# Patient Record
Sex: MALE | Race: BLACK OR AFRICAN AMERICAN | Employment: OTHER | URBAN - METROPOLITAN AREA
[De-identification: names, ages, dates, MRNs, and addresses within clinical notes are randomized per-mention and may not be internally consistent; named-entity substitution may affect disease eponyms.]

---

## 2017-06-13 ENCOUNTER — APPOINTMENT (OUTPATIENT)
Dept: GENERAL RADIOLOGY | Age: 82
End: 2017-06-13
Attending: STUDENT IN AN ORGANIZED HEALTH CARE EDUCATION/TRAINING PROGRAM
Payer: MEDICARE

## 2017-06-13 ENCOUNTER — HOSPITAL ENCOUNTER (EMERGENCY)
Age: 82
Discharge: HOME | End: 2017-06-13
Attending: STUDENT IN AN ORGANIZED HEALTH CARE EDUCATION/TRAINING PROGRAM
Payer: MEDICARE

## 2017-06-13 VITALS
WEIGHT: 205 LBS | HEART RATE: 82 BPM | DIASTOLIC BLOOD PRESSURE: 76 MMHG | TEMPERATURE: 97.4 F | OXYGEN SATURATION: 92 % | SYSTOLIC BLOOD PRESSURE: 137 MMHG | RESPIRATION RATE: 20 BRPM | HEIGHT: 68 IN | BODY MASS INDEX: 31.07 KG/M2

## 2017-06-13 DIAGNOSIS — S39.012A BACK STRAIN, INITIAL ENCOUNTER: Primary | ICD-10-CM

## 2017-06-13 PROCEDURE — 72100 X-RAY EXAM L-S SPINE 2/3 VWS: CPT

## 2017-06-13 PROCEDURE — 99282 EMERGENCY DEPT VISIT SF MDM: CPT

## 2017-06-13 PROCEDURE — 96372 THER/PROPH/DIAG INJ SC/IM: CPT

## 2017-06-13 PROCEDURE — 74011250636 HC RX REV CODE- 250/636: Performed by: STUDENT IN AN ORGANIZED HEALTH CARE EDUCATION/TRAINING PROGRAM

## 2017-06-13 RX ORDER — HYDRALAZINE HYDROCHLORIDE 10 MG/1
5 TABLET, FILM COATED ORAL 2 TIMES DAILY
COMMUNITY
End: 2017-07-02

## 2017-06-13 RX ORDER — LIDOCAINE 50 MG/G
PATCH TOPICAL
Qty: 1 EACH | Refills: 0 | Status: SHIPPED | OUTPATIENT
Start: 2017-06-13 | End: 2017-07-02

## 2017-06-13 RX ORDER — HYDROMORPHONE HYDROCHLORIDE 1 MG/ML
1 INJECTION, SOLUTION INTRAMUSCULAR; INTRAVENOUS; SUBCUTANEOUS
Status: COMPLETED | OUTPATIENT
Start: 2017-06-13 | End: 2017-06-13

## 2017-06-13 RX ORDER — ASPIRIN 81 MG/1
81 TABLET ORAL DAILY
COMMUNITY
End: 2017-09-26

## 2017-06-13 RX ORDER — BUMETANIDE 1 MG/1
0.5 TABLET ORAL DAILY
Status: ON HOLD | COMMUNITY
End: 2018-07-14

## 2017-06-13 RX ORDER — ALBUTEROL SULFATE 90 UG/1
2 AEROSOL, METERED RESPIRATORY (INHALATION)
COMMUNITY
End: 2017-09-26

## 2017-06-13 RX ADMIN — HYDROMORPHONE HYDROCHLORIDE 1 MG: 1 INJECTION, SOLUTION INTRAMUSCULAR; INTRAVENOUS; SUBCUTANEOUS at 09:17

## 2017-06-13 NOTE — ED NOTES
Dr. Mily Forte discussed d/c instructions and information with pt. Pt verbalized understanding. Pt assisted out of ED via w/c b y staff. No acute distress noted upon leave.

## 2017-06-13 NOTE — DISCHARGE INSTRUCTIONS
We hope that we have addressed all of your medical concerns. The examination and treatment you received in the Emergency Department were for an emergent problem and were not intended as complete care. It is important that you follow up with your healthcare provider(s) for ongoing care. If your symptoms worsen or do not improve as expected, and you are unable to reach your usual health care provider(s), you should return to the Emergency Department. Today's healthcare is undergoing tremendous change, and patient satisfaction surveys are one of the many tools to assess the quality of medical care. You may receive a survey from the [employer] regarding your experience in the Emergency Department. I hope that your experience has been completely positive, particularly the medical care that I provided. As such, please participate in the survey; anything less than excellent does not meet my expectations or intentions. [facility] and [address] [place] [address] participate in nationally recognized quality of care measures. If your blood pressure is greater than 120/80, as reported below, we urge that you seek medical care to address the potential of high blood pressure, commonly known as hypertension. Hypertension can be hereditary or can be caused by certain medical conditions, pain, stress, or \"white coat syndrome. \"       Please make an appointment with your health care provider(s) for follow up of your Emergency Department visit. VITALS:   Patient Vitals for the past 8 hrs:   Temp Pulse Resp BP SpO2   06/13/17 0846 97.4 °F (36.3 °C) 82 20 126/88 95 %          Thank you for allowing us to provide you with medical care today. We realize that you have many choices for your emergency care needs. Please choose us in the future for any continued health care needs. Teresa Bernard Monday, [address]   Office: [phone]            No results found for this or any previous visit (from the past 24 hour(s)). Xr Spine Lumb 2 Or 3 V    Result Date: 6/13/2017  EXAM:  XR SPINE LUMB 2 OR 3 V INDICATION:  Low back pain and right hip pain since yesterday. COMPARISON: None. TECHNIQUE: 3 views lumbar spine. FINDINGS: Vertebral body heights are maintained. There is intervertebral disc space narrowing at L3-4, L4-5, and L5-S1. There is 7 mm of anterolisthesis at L4-5 and L5-S1. Lower lumbar facet arthrosis is demonstrated. Bilateral hip prostheses are partially visualized. An IVC filter is present. Round radiodensities in the right upper abdomen may represent gallstones. Vascular calcification is noted. IMPRESSION: 1. No acute fracture or subluxation. Lower lumbar degenerative changes including grade 1 anterolisthesis at L4-5 and L5-S1. 2. Possible cholelithiasis. Back Strain: Care Instructions  Your Care Instructions    Back strain happens when you overstretch, or pull, a muscle in your back. You may hurt your back in an accident or when you exercise or lift something. Most back pain will get better with rest and time. You can take care of yourself at home to help your back heal.  Follow-up care is a key part of your treatment and safety. Be sure to make and go to all appointments, and call your doctor if you are having problems. It's also a good idea to know your test results and keep a list of the medicines you take. How can you care for yourself at home? · Try to stay as active as you can, but stop or reduce any activity that causes pain. · Put ice or a cold pack on the sore muscle for 10 to 20 minutes at a time to stop swelling. Try this every 1 to 2 hours for 3 days (when you are awake) or until the swelling goes down. Put a thin cloth between the ice pack and your skin. · After 2 or 3 days, apply a heating pad on low or a warm cloth to your back.  Some doctors suggest that you go back and forth between hot and cold treatments. · Take pain medicines exactly as directed. ¨ If the doctor gave you a prescription medicine for pain, take it as prescribed. ¨ If you are not taking a prescription pain medicine, ask your doctor if you can take an over-the-counter medicine. · Try sleeping on your side with a pillow between your legs. Or put a pillow under your knees when you lie on your back. These measures can ease pain in your lower back. · Return to your usual level of activity slowly. When should you call for help? Call 911 anytime you think you may need emergency care. For example, call if:  · You are unable to move a leg at all. Call your doctor now or seek immediate medical care if:  · You have new or worse symptoms in your legs, belly, or buttocks. Symptoms may include:  ¨ Numbness or tingling. ¨ Weakness. ¨ Pain. · You lose bladder or bowel control. Watch closely for changes in your health, and be sure to contact your doctor if you are not getting better as expected. Where can you learn more? Go to http://kavitha-jolie.info/. Enter H642 in the search box to learn more about \"Back Strain: Care Instructions. \"  Current as of: May 23, 2016  Content Version: 11.2  © 4663-0919 [employer]. Care instructions adapted under license by [employer] (which disclaims liability or warranty for this information). If you have questions about a medical condition or this instruction, always ask your healthcare professional. [address] any warranty or liability for your use of this information.

## 2017-06-13 NOTE — ED PROVIDER NOTES
HPI Comments: 80 y.o. male with past medical history significant for CAD, heart failure and HTN who presents from home accompanied by his daughter with chief complaint of back pain. Pt states he \"pulled a muscle\" in his right lumbar back 2 days ago after he leaned down to  a piece of paper while seated in a chair. Pt states pain is exacerbated with inspiration and movement. Pt states he has a history of the same. Pt states he took tylenol with no relief. Pt states he is ambulatory with some pain. Pt endorses a history of bilateral total hip replacements by Dr. Jayy Banegas. Pt's daughter also expresses concern over lipoma on right back that \"is now moveable\". 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 Pt specifically denies fever, vomiting and abd pain. There are no other acute medical concerns at this time. Social hx: denies tobacco use; denies EtOH use  PCP: Garret Shaw MD    Note written by Timi Mccain, as dictated by Curry Vásquez MD 9:05 AM         The history is provided by the patient and a relative. Past Medical History:   Diagnosis Date    CAD (coronary artery disease)     Heart failure ([facility] [address]     Hypertension        Past Surgical History:   Procedure Laterality Date    HX ORTHOPAEDIC      bilateral hip replacement    HX PACEMAKER           No family history on file. Social History     Social History    Marital status:      Spouse name: N/A    Number of children: N/A    Years of education: N/A     Occupational History    Not on file. Social History Main Topics    Smoking status: Never Smoker    Smokeless tobacco: Not on file    Alcohol use No    Drug use: Not on file    Sexual activity: Not on file     Other Topics Concern    Not on file     Social History Narrative         ALLERGIES: Penicillins    Review of Systems   Constitutional: Negative for fever. Gastrointestinal: Negative for abdominal pain and vomiting. Musculoskeletal: Positive for back pain. All other systems reviewed and are negative. Vitals:    06/13/17 0846   BP: 126/88   Pulse: 82   Resp: 20   Temp: 97.4 °F (36.3 °C)   SpO2: 95%   Weight: 93 kg (205 lb)   Height: 5' 8\" (1.727 m)            Physical Exam   Constitutional: He is oriented to person, place, and time. He appears well-developed. No distress. HENT:   Head: Normocephalic and atraumatic. Eyes: Conjunctivae and EOM are normal.   Neck: Normal range of motion. Neck supple. Cardiovascular: Normal rate, regular rhythm and normal heart sounds. No murmur heard. Pulmonary/Chest: Effort normal and breath sounds normal. No respiratory distress. Abdominal: Soft. Bowel sounds are normal. He exhibits no distension. There is no tenderness. There is no rebound. Musculoskeletal: Normal range of motion. He exhibits no edema or tenderness. Right para-lumbar muscle spasm with mild tenderness to palpation. No midline tenderness to palpation. No saddle anesthesia. Lipoma over right thoracic back. Straight-leg test negative. Neurological: He is alert and oriented to person, place, and time. No cranial nerve deficit. He exhibits normal muscle tone. Coordination normal.   Skin: Skin is warm and dry. Nursing note and vitals reviewed. Note written by Timi Enriquez, as dictated by Rene Vasquez MD 9:05 AM     [facility]  ED Course   The patient presented with acute back pain. The patient is now resting comfortably and feels better, is alert, talkative, interactive and in no distress. The repeat examination is unremarkable and benign. The patient is neurologically intact and is ambulatory in the ED. The patient has no fever, no bowel or bladder incontinence, no saddle anesthesia, and is otherwise alert and well-appearing.  The history, physical examination, and diagnostics (if any) do not suggest the presence of acute spinal epidural abscess, acute spinal epidural bleed, cauda equina syndrome, abdominal aortic aneurysm, aortic dissection or other process requiring further testing, treatment or consultation in the emergency department. The vital signs have been stable. The patient's condition is stable and appropriate for discharge. The patient will pursue further outpatient evaluation with the primary care physician or other designated or consulting physician as indicated in the discharge instructions.       Procedures

## 2017-06-13 NOTE — ED TRIAGE NOTES
Triage Note: Patient is coming in complaining of right hip pain that started yesterday after leaning over to pick something up. Denies fall.

## 2017-07-02 ENCOUNTER — APPOINTMENT (OUTPATIENT)
Dept: GENERAL RADIOLOGY | Age: 82
End: 2017-07-02
Attending: EMERGENCY MEDICINE
Payer: MEDICARE

## 2017-07-02 ENCOUNTER — HOSPITAL ENCOUNTER (OUTPATIENT)
Age: 82
Setting detail: OBSERVATION
Discharge: HOME HEALTH | End: 2017-07-03
Attending: EMERGENCY MEDICINE | Admitting: INTERNAL MEDICINE
Payer: MEDICARE

## 2017-07-02 DIAGNOSIS — N28.9 RENAL INSUFFICIENCY: ICD-10-CM

## 2017-07-02 DIAGNOSIS — R07.9 CHEST PAIN, UNSPECIFIED TYPE: Primary | ICD-10-CM

## 2017-07-02 LAB
ALBUMIN SERPL BCP-MCNC: 3.2 G/DL (ref 3.5–5)
ALBUMIN/GLOB SERPL: 0.7 {RATIO} (ref 1.1–2.2)
ALP SERPL-CCNC: 118 U/L (ref 45–117)
ALT SERPL-CCNC: 12 U/L (ref 12–78)
AMYLASE SERPL-CCNC: 74 U/L (ref 25–115)
ANION GAP BLD CALC-SCNC: 7 MMOL/L (ref 5–15)
APTT PPP: 34 SEC (ref 22.1–32.5)
AST SERPL W P-5'-P-CCNC: 11 U/L (ref 15–37)
BASOPHILS # BLD AUTO: 0 K/UL (ref 0–0.1)
BASOPHILS # BLD: 0 % (ref 0–1)
BILIRUB SERPL-MCNC: 0.5 MG/DL (ref 0.2–1)
BNP SERPL-MCNC: 395 PG/ML (ref 0–100)
BUN SERPL-MCNC: 17 MG/DL (ref 6–20)
BUN/CREAT SERPL: 9 (ref 12–20)
CALCIUM SERPL-MCNC: 8.8 MG/DL (ref 8.5–10.1)
CHLORIDE SERPL-SCNC: 106 MMOL/L (ref 97–108)
CO2 SERPL-SCNC: 26 MMOL/L (ref 21–32)
CREAT SERPL-MCNC: 1.89 MG/DL (ref 0.7–1.3)
D DIMER PPP FEU-MCNC: 1.38 MG/L FEU (ref 0–0.65)
EOSINOPHIL # BLD: 0.4 K/UL (ref 0–0.4)
EOSINOPHIL NFR BLD: 4 % (ref 0–7)
ERYTHROCYTE [DISTWIDTH] IN BLOOD BY AUTOMATED COUNT: 18.9 % (ref 11.5–14.5)
GLOBULIN SER CALC-MCNC: 4.8 G/DL (ref 2–4)
GLUCOSE SERPL-MCNC: 101 MG/DL (ref 65–100)
HCT VFR BLD AUTO: 35.4 % (ref 36.6–50.3)
HGB BLD-MCNC: 11 G/DL (ref 12.1–17)
INR PPP: 1.1 (ref 0.9–1.1)
LIPASE SERPL-CCNC: 95 U/L (ref 73–393)
LYMPHOCYTES # BLD AUTO: 25 % (ref 12–49)
LYMPHOCYTES # BLD: 2.5 K/UL (ref 0.8–3.5)
MCH RBC QN AUTO: 26.6 PG (ref 26–34)
MCHC RBC AUTO-ENTMCNC: 31.1 G/DL (ref 30–36.5)
MCV RBC AUTO: 85.5 FL (ref 80–99)
MONOCYTES # BLD: 1.1 K/UL (ref 0–1)
MONOCYTES NFR BLD AUTO: 11 % (ref 5–13)
NEUTS SEG # BLD: 6.2 K/UL (ref 1.8–8)
NEUTS SEG NFR BLD AUTO: 60 % (ref 32–75)
PLATELET # BLD AUTO: 306 K/UL (ref 150–400)
POTASSIUM SERPL-SCNC: 4.2 MMOL/L (ref 3.5–5.1)
PROT SERPL-MCNC: 8 G/DL (ref 6.4–8.2)
PROTHROMBIN TIME: 11.7 SEC (ref 9–11.1)
RBC # BLD AUTO: 4.14 M/UL (ref 4.1–5.7)
SODIUM SERPL-SCNC: 139 MMOL/L (ref 136–145)
THERAPEUTIC RANGE,PTTT: ABNORMAL SECS (ref 58–77)
TROPONIN I SERPL-MCNC: <0.04 NG/ML
WBC # BLD AUTO: 10.2 K/UL (ref 4.1–11.1)

## 2017-07-02 PROCEDURE — 99285 EMERGENCY DEPT VISIT HI MDM: CPT

## 2017-07-02 PROCEDURE — 80053 COMPREHEN METABOLIC PANEL: CPT | Performed by: EMERGENCY MEDICINE

## 2017-07-02 PROCEDURE — 85379 FIBRIN DEGRADATION QUANT: CPT | Performed by: INTERNAL MEDICINE

## 2017-07-02 PROCEDURE — 71020 XR CHEST PA LAT: CPT

## 2017-07-02 PROCEDURE — 99218 HC RM OBSERVATION: CPT

## 2017-07-02 PROCEDURE — 83690 ASSAY OF LIPASE: CPT | Performed by: INTERNAL MEDICINE

## 2017-07-02 PROCEDURE — 84484 ASSAY OF TROPONIN QUANT: CPT | Performed by: EMERGENCY MEDICINE

## 2017-07-02 PROCEDURE — 85730 THROMBOPLASTIN TIME PARTIAL: CPT | Performed by: EMERGENCY MEDICINE

## 2017-07-02 PROCEDURE — 83880 ASSAY OF NATRIURETIC PEPTIDE: CPT | Performed by: EMERGENCY MEDICINE

## 2017-07-02 PROCEDURE — 82150 ASSAY OF AMYLASE: CPT | Performed by: INTERNAL MEDICINE

## 2017-07-02 PROCEDURE — 85025 COMPLETE CBC W/AUTO DIFF WBC: CPT | Performed by: EMERGENCY MEDICINE

## 2017-07-02 PROCEDURE — 74011250637 HC RX REV CODE- 250/637: Performed by: EMERGENCY MEDICINE

## 2017-07-02 PROCEDURE — 74011250636 HC RX REV CODE- 250/636: Performed by: INTERNAL MEDICINE

## 2017-07-02 PROCEDURE — 36415 COLL VENOUS BLD VENIPUNCTURE: CPT | Performed by: EMERGENCY MEDICINE

## 2017-07-02 PROCEDURE — 93005 ELECTROCARDIOGRAM TRACING: CPT

## 2017-07-02 PROCEDURE — 85610 PROTHROMBIN TIME: CPT | Performed by: EMERGENCY MEDICINE

## 2017-07-02 PROCEDURE — 74011250637 HC RX REV CODE- 250/637: Performed by: INTERNAL MEDICINE

## 2017-07-02 PROCEDURE — 96372 THER/PROPH/DIAG INJ SC/IM: CPT

## 2017-07-02 RX ORDER — ACETAMINOPHEN AND CODEINE PHOSPHATE 300; 30 MG/1; MG/1
1 TABLET ORAL
COMMUNITY
End: 2017-07-10

## 2017-07-02 RX ORDER — ATORVASTATIN CALCIUM 40 MG/1
80 TABLET, FILM COATED ORAL
Status: DISCONTINUED | OUTPATIENT
Start: 2017-07-02 | End: 2017-07-03 | Stop reason: HOSPADM

## 2017-07-02 RX ORDER — ASPIRIN 81 MG/1
81 TABLET ORAL DAILY
Status: DISCONTINUED | OUTPATIENT
Start: 2017-07-03 | End: 2017-07-03 | Stop reason: HOSPADM

## 2017-07-02 RX ORDER — LANOLIN ALCOHOL/MO/W.PET/CERES
325 CREAM (GRAM) TOPICAL
COMMUNITY
End: 2017-09-26

## 2017-07-02 RX ORDER — GUAIFENESIN 100 MG/5ML
162 LIQUID (ML) ORAL
Status: COMPLETED | OUTPATIENT
Start: 2017-07-02 | End: 2017-07-02

## 2017-07-02 RX ORDER — ACETAMINOPHEN AND CODEINE PHOSPHATE 300; 30 MG/1; MG/1
1 TABLET ORAL
Status: DISCONTINUED | OUTPATIENT
Start: 2017-07-02 | End: 2017-07-03 | Stop reason: HOSPADM

## 2017-07-02 RX ORDER — ONDANSETRON 2 MG/ML
4 INJECTION INTRAMUSCULAR; INTRAVENOUS
Status: DISCONTINUED | OUTPATIENT
Start: 2017-07-02 | End: 2017-07-03 | Stop reason: HOSPADM

## 2017-07-02 RX ORDER — CARVEDILOL 12.5 MG/1
25 TABLET ORAL 2 TIMES DAILY WITH MEALS
Status: DISCONTINUED | OUTPATIENT
Start: 2017-07-03 | End: 2017-07-03 | Stop reason: HOSPADM

## 2017-07-02 RX ORDER — DOCUSATE SODIUM 100 MG/1
100 CAPSULE, LIQUID FILLED ORAL 2 TIMES DAILY
Status: DISCONTINUED | OUTPATIENT
Start: 2017-07-02 | End: 2017-07-03 | Stop reason: HOSPADM

## 2017-07-02 RX ORDER — BUMETANIDE 1 MG/1
1 TABLET ORAL DAILY
Status: DISCONTINUED | OUTPATIENT
Start: 2017-07-03 | End: 2017-07-03 | Stop reason: HOSPADM

## 2017-07-02 RX ORDER — CARVEDILOL 25 MG/1
12.5 TABLET ORAL 2 TIMES DAILY WITH MEALS
COMMUNITY
End: 2017-08-11

## 2017-07-02 RX ORDER — HYDROMORPHONE HYDROCHLORIDE 1 MG/ML
1 INJECTION, SOLUTION INTRAMUSCULAR; INTRAVENOUS; SUBCUTANEOUS
Status: DISCONTINUED | OUTPATIENT
Start: 2017-07-02 | End: 2017-07-03 | Stop reason: HOSPADM

## 2017-07-02 RX ORDER — HYDROCODONE BITARTRATE AND ACETAMINOPHEN 5; 325 MG/1; MG/1
1 TABLET ORAL
Status: DISCONTINUED | OUTPATIENT
Start: 2017-07-02 | End: 2017-07-03 | Stop reason: HOSPADM

## 2017-07-02 RX ORDER — ACETAMINOPHEN 325 MG/1
650 TABLET ORAL
Status: DISCONTINUED | OUTPATIENT
Start: 2017-07-02 | End: 2017-07-03 | Stop reason: HOSPADM

## 2017-07-02 RX ORDER — HEPARIN SODIUM 5000 [USP'U]/ML
5000 INJECTION, SOLUTION INTRAVENOUS; SUBCUTANEOUS EVERY 8 HOURS
Status: DISCONTINUED | OUTPATIENT
Start: 2017-07-02 | End: 2017-07-03 | Stop reason: HOSPADM

## 2017-07-02 RX ORDER — HYDRALAZINE HYDROCHLORIDE 25 MG/1
25 TABLET, FILM COATED ORAL DAILY
COMMUNITY
End: 2017-08-11

## 2017-07-02 RX ORDER — DICLOFENAC SODIUM 30 MG/G
1 GEL TOPICAL DAILY
COMMUNITY
End: 2017-07-10

## 2017-07-02 RX ORDER — LANOLIN ALCOHOL/MO/W.PET/CERES
325 CREAM (GRAM) TOPICAL
Status: DISCONTINUED | OUTPATIENT
Start: 2017-07-03 | End: 2017-07-03 | Stop reason: HOSPADM

## 2017-07-02 RX ORDER — ALBUTEROL SULFATE 0.83 MG/ML
2.5 SOLUTION RESPIRATORY (INHALATION)
Status: DISCONTINUED | OUTPATIENT
Start: 2017-07-02 | End: 2017-07-03 | Stop reason: HOSPADM

## 2017-07-02 RX ORDER — HYDRALAZINE HYDROCHLORIDE 25 MG/1
12.5 TABLET, FILM COATED ORAL 2 TIMES DAILY
Status: DISCONTINUED | OUTPATIENT
Start: 2017-07-02 | End: 2017-07-03 | Stop reason: HOSPADM

## 2017-07-02 RX ADMIN — DOCUSATE SODIUM 100 MG: 100 CAPSULE, LIQUID FILLED ORAL at 22:11

## 2017-07-02 RX ADMIN — ATORVASTATIN CALCIUM 80 MG: 40 TABLET, FILM COATED ORAL at 22:12

## 2017-07-02 RX ADMIN — HEPARIN SODIUM 5000 UNITS: 5000 INJECTION, SOLUTION INTRAVENOUS; SUBCUTANEOUS at 22:11

## 2017-07-02 RX ADMIN — ASPIRIN 81 MG 162 MG: 81 TABLET ORAL at 16:25

## 2017-07-02 RX ADMIN — HYDROCODONE BITARTRATE AND ACETAMINOPHEN 1 TABLET: 5; 325 TABLET ORAL at 22:11

## 2017-07-02 RX ADMIN — HYDRALAZINE HYDROCHLORIDE 12.5 MG: 25 TABLET, FILM COATED ORAL at 22:11

## 2017-07-02 NOTE — ED PROVIDER NOTES
HPI Comments: 80 y.o. male with past medical history significant for CAD, heart failure, and HTN who presents from personal vehicle with family with chief complaint of CP. Pt c/o new onset of intermittent, left sided CP with secondary SOB and dry cough that started last night. Pt states that his episodes of CP happen every 30 minutes and last for ~ 10 minutes. Pt notes that his pain radiates into his central and right chest and feels like its \"tightening up\". Pt states that he has been using his albuterol inhaler with no relief for his cough. Pt states that he was being treated for a \"pulled muscle\" in his right rib area with tylenol and topical medication but notes that he hasn't taken any of the pain medication in 2 days. Pt's daughter states that the pt is scheduled for a nuclear stress test on July 10th. Pt is on an 81 mg aspririn regimen and states that he did take it this morning. Pt denies doing anything different prior to the onset of his pain and having taken any pain medication for his CP PTA. There are no other acute medical concerns at this time. Social hx: (-)smoker, (-)EtOH    PCP: Ashutosh Zapata MD    Cardiologist: Dr. Clifford Garrison     Note written by Antonio Hull. Tayo Bryan, as dictated by Freya Fofana MD 3:19 PM      The history is provided by the patient. Past Medical History:   Diagnosis Date    CAD (coronary artery disease)     Heart failure ([facility] [address]     Hypertension        Past Surgical History:   Procedure Laterality Date    HX ORTHOPAEDIC      bilateral hip replacement    HX PACEMAKER           History reviewed. No pertinent family history. Social History     Social History    Marital status:      Spouse name: N/A    Number of children: N/A    Years of education: N/A     Occupational History    Not on file.      Social History Main Topics    Smoking status: Never Smoker    Smokeless tobacco: Never Used    Alcohol use No    Drug use: Not on file    Sexual activity: Not on file     Other Topics Concern    Not on file     Social History Narrative         ALLERGIES: Penicillins    Review of Systems   Constitutional: Negative for diaphoresis and fever. HENT: Negative for facial swelling. Eyes: Negative for visual disturbance. Respiratory: Positive for cough (dry) and shortness of breath. Cardiovascular: Positive for chest pain (left sided). Gastrointestinal: Negative for abdominal pain. Genitourinary: Negative for dysuria. Musculoskeletal: Negative for joint swelling. Skin: Negative for rash. Neurological: Negative for headaches. Hematological: Negative for adenopathy. Psychiatric/Behavioral: Negative for suicidal ideas. All other systems reviewed and are negative. Vitals:    07/02/17 1503   BP: (!) 149/92   Pulse: 91   Resp: 16   Temp: 97.7 °F (36.5 °C)   SpO2: 94%   Weight: 93 kg (205 lb)   Height: 5' 8\" (1.727 m)            Physical Exam   Constitutional: He is oriented to person, place, and time. He appears well-developed and well-nourished. No distress. HENT:   Head: Normocephalic and atraumatic. Mouth/Throat: Oropharynx is clear and moist.   Eyes: Pupils are equal, round, and reactive to light. Neck: Normal range of motion. Neck supple. Cardiovascular: Normal rate, regular rhythm, normal heart sounds and intact distal pulses. Pacemaker left chest wall   Pulmonary/Chest: Effort normal and breath sounds normal. No respiratory distress. Abdominal: Soft. Bowel sounds are normal. He exhibits no distension. There is no tenderness. Musculoskeletal: Normal range of motion. He exhibits no edema. Neurological: He is alert and oriented to person, place, and time. Skin: Skin is warm and dry. Nursing note and vitals reviewed. Note written by Charissa Marcos.  Shannan Delgado, as dictated by Kim Titus MD 3:19 PM       MDM  Number of Diagnoses or Management Options  Diagnosis management comments: A:  79yo M with intermittent CP starting last night. Has h/o cad and pacemaker. VS stable with slightly elevated BP. Pt's daughter adds that pt is scheduled for nuclear stress test with Dr. Marlen Rodriguez on 7/10. P:  ecg  cxr  Labs  Joey Mode  reassess    ED Course       Procedures    ED EKG interpretation:  Rhythm: normal sinus rhythm. Rate (approx.): 87.  Axis: normal.  ST segment:  No concerning ST elevations or depressions. This EKG was interpreted by Zaid Ambriz MD,ED Provider. Chest X-ray, 2 view:  Chest xray, PA and Lat, shows no signs of acute disease. This CXR was interpreted by Zaid Ambriz MD, ED Provider. Lab Results Significant For:  CBC:  unremarkable  CMP:  unremarkable  Trop:  unremarkable  BNP:  395    4:26 PM  Patient resting comfortably with no complaints at this time. VS remain stable. Repeat physical exam is unremarkable. Pt ambulatory without difficulty and tolerating po's well. No chest pain since arrival in ED. Will discuss with cardiology. CONSULT NOTE:  4:47 PM Zaid Ambriz MD spoke with Dr. Katerine Brothers, Consult for Cardiology. Discussed available diagnostic tests and clinical findings. He is in agreement with care plans as outlined. Dr. Katerine Brothers agrees to the plan to admit to the hospitalist and Dr. Marlen Rodriguez can see the pt in the morning. CONSULT NOTE:  4:57 PM Zaid Ambriz MD spoke with Dr. Moisés Adams, Consult for Hospitalist.  Discussed available diagnostic tests and clinical findings. He is in agreement with care plans as outlined. Dr. Moisés Adams will admit.

## 2017-07-02 NOTE — CONSULTS
Consult    Patient: Samaria Larios Sr. MRN: [id number]  SSN: [id number]    YOB: 1930  Age: 80 y.o. Sex: male       Subjective:      Date of  Admission: 7/2/2017     Admission type: Emergency     Reason for consult: chest pain    Marcial Perry is a 80 y.o. male admitted for Chest pain. Mr Orquidea Page is a patient of Dr Heidi Leung with a known h/o CAD (s/p PCI to [place] [address] in 2009) and CHF with an EF of 40%, coming in today with chest pain that has been bothering him since last night. Says it was intermittent, off and on all night. Different locations mentioned: epigastrium, right side of chest, but mostly he says it has been over the left side of his chest. Duration of each episode around 15-30 minutes. No obvious relieving factors. Worse with deep breaths. Currently pain free. EKG in the ER shows NSR with no ischemic changes  Trop is negative    He just saw Dr Heidi Leung about 1 week ago and had mentioned having some chest pain at times, so a stress test had been ordered but has not been done yet. Primary Care Provider: Connie Ricketts MD  Past Medical History:   Diagnosis Date    CAD (coronary artery disease)     Heart failure ([address]     Hypertension       Past Surgical History:   Procedure Laterality Date    HX ORTHOPAEDIC      bilateral hip replacement    HX PACEMAKER       History reviewed. No pertinent family history. Social History   Substance Use Topics    Smoking status: Never Smoker    Smokeless tobacco: Never Used    Alcohol use No      No current facility-administered medications for this encounter. Current Outpatient Prescriptions   Medication Sig    albuterol (PROVENTIL HFA, VENTOLIN HFA, PROAIR HFA) 90 mcg/actuation inhaler Take 2 Puffs by inhalation.  aspirin delayed-release 81 mg tablet Take 81 mg by mouth daily.  bumetanide (BUMEX) 1 mg tablet Take 1 mg by mouth daily.  Ferrous Fumarate 325 mg (106 mg iron) tab Take 325 mg by mouth.     hydrALAZINE (APRESOLINE) 10 mg tablet Take 5 mg by mouth two (2) times a day.  lidocaine (LIDODERM) 5 % Apply patch to the affected area for 12 hours a day and remove for 12 hours a day.  atorvastatin (LIPITOR) 20 mg tablet Take 80 mg by mouth daily.  carvedilol (COREG) 6.25 mg tablet Take 6.25 mg by mouth two (2) times daily (with meals).  hydrochlorothiazide (HYDRODIURIL) 25 mg tablet Take 25 mg by mouth.  losartan (COZAAR) 50 mg tablet Take 50 mg by mouth daily. Allergies   Allergen Reactions    Penicillins Itching        Review of Systems:  A comprehensive review of systems was negative except for that written in the History of Present Illness. Subjective:     Physical Exam:  Visit Vitals    /87    Pulse 85    Temp 98.5 °F (36.9 °C)    Resp 27    Ht 5' 8\" (1.727 m)    Wt 93 kg (205 lb)    SpO2 92%    BMI 31.17 kg/m2     General Appearance:  Well developed, well nourished,alert and oriented x 3, and individual in no acute distress. Ears/Nose/Mouth/Throat:   Hearing grossly normal.         Neck: Supple. Chest:   Lungs clear to auscultation bilaterally. Cardiovascular:  Regular rate and rhythm, S1, S2 normal, no murmur. Abdomen:   Soft, non-tender, bowel sounds are active. Extremities: No edema bilaterally. Skin: Warm and dry.                  Cardiographics:  Telemetry: normal sinus rhythm with PVCs  ECG: normal sinus rhythm, occasional PVC noted, unifocal  Echocardiogram: Not done    Data Reviewed:   BMP:   Lab Results   Component Value Date/Time     07/02/2017 03:11 PM    K 4.2 07/02/2017 03:11 PM     07/02/2017 03:11 PM    CO2 26 07/02/2017 03:11 PM    AGAP 7 07/02/2017 03:11 PM     (H) 07/02/2017 03:11 PM    BUN 17 07/02/2017 03:11 PM    CREA 1.89 (H) 07/02/2017 03:11 PM    GFRAA 41 (L) 07/02/2017 03:11 PM    GFRNA 34 (L) 07/02/2017 03:11 PM     CBC:   Lab Results   Component Value Date/Time    WBC 10.2 07/02/2017 03:11 PM    HGB 11.0 (L) 07/02/2017 03:11 PM    HCT 35.4 (L) 07/02/2017 03:11 PM     07/02/2017 03:11 PM     All Cardiac Markers in the last 24 hours:   Lab Results   Component Value Date/Time    TROIQ <0.04 07/02/2017 03:11 PM     (H) 07/02/2017 03:11 PM        Assessment:      1) Chest pain: atypical  Overall doesn't sound very cardiac at all    2) CAD  H/o PCI 2009 to LCx    3) Chronic systolic heart failure  NYHA III  Seems euvolemic currently    4) CKD stage III    5) HTN    6) s/p ICD     Plan:     Reasonable to keep him for overnight observation  Admit to hospitalist service  NPO from [place]  Stress test in the AM Layla Kumar)    Signed By: Long Mtz MD     July 2, 2017

## 2017-07-02 NOTE — IP AVS SNAPSHOT
Current Discharge Medication List  
  
START taking these medications Dose & Instructions Dispensing Information Comments Morning Noon Evening Bedtime  
 isosorbide mononitrate ER 30 mg tablet Commonly known as:  IMDUR Your last dose was: Your next dose is:    
   
   
 Dose:  30 mg Take 1 Tab by mouth daily. Quantity:  30 Tab Refills:  3  
     
   
   
   
  
 nitroglycerin 0.4 mg SL tablet Commonly known as:  NITROSTAT Your last dose was: Your next dose is:    
   
   
 Dose:  0.4 mg  
1 Tab by SubLINGual route every five (5) minutes as needed for Chest Pain for up to 3 doses. Quantity:  1 Bottle Refills:  3 CONTINUE these medications which have NOT CHANGED Dose & Instructions Dispensing Information Comments Morning Noon Evening Bedtime  
 acetaminophen-codeine 300-30 mg per tablet Commonly known as:  TYLENOL #3 Your last dose was: Your next dose is:    
   
   
 Dose:  1 Tab Take 1 Tab by mouth every six (6) hours as needed for Pain. Refills:  0  
     
   
   
   
  
 albuterol 90 mcg/actuation inhaler Commonly known as:  PROVENTIL HFA, VENTOLIN HFA, PROAIR HFA Your last dose was: Your next dose is:    
   
   
 Dose:  2 Puff Take 2 Puffs by inhalation every four (4) hours as needed. Refills:  0  
     
   
   
   
  
 aspirin delayed-release 81 mg tablet Your last dose was: Your next dose is:    
   
   
 Dose:  81 mg Take 81 mg by mouth daily. Refills:  0  
     
   
   
   
  
 atorvastatin 20 mg tablet Commonly known as:  LIPITOR Your last dose was: Your next dose is:    
   
   
 Dose:  80 mg Take 80 mg by mouth nightly. Refills:  0  
     
   
   
   
  
 bumetanide 1 mg tablet Commonly known as:  Tenisha Noun Your last dose was: Your next dose is:    
   
   
 Dose:  1 mg Take 1 mg by mouth daily. Refills:  0 carvedilol 25 mg tablet Commonly known as:  Jorge Luis Dawkins Your last dose was: Your next dose is:    
   
   
 Dose:  25 mg Take 25 mg by mouth two (2) times daily (with meals). Refills:  0  
     
   
   
   
  
 diclofenac 3 % topical gel Commonly known as:  Everet Shorten Your last dose was: Your next dose is:    
   
   
 Dose:  1 Each Apply 1 Each to affected area daily. Refills:  0  
     
   
   
   
  
 ferrous sulfate 325 mg (65 mg iron) tablet Your last dose was: Your next dose is:    
   
   
 Dose:  325 mg Take 325 mg by mouth Daily (before breakfast). Refills:  0  
     
   
   
   
  
 hydrALAZINE 25 mg tablet Commonly known as:  APRESOLINE Your last dose was: Your next dose is:    
   
   
 Dose:  12.5 mg Take 12.5 mg by mouth two (2) times a day. Refills:  0 Where to Get Your Medications Information on where to get these meds will be given to you by the nurse or doctor. ! Ask your nurse or doctor about these medications  
  isosorbide mononitrate ER 30 mg tablet  
 nitroglycerin 0.4 mg SL tablet

## 2017-07-02 NOTE — PROGRESS NOTES
Admission Medication Reconciliation:    Information obtained from: Patient/Medication list from home    Significant PMH/Disease States:   Past Medical History:   Diagnosis Date    CAD (coronary artery disease)     Heart failure ([facility] [address]     Hypertension        Chief Complaint for this Admission:  CP    Allergies:  Penicillins    Prior to Admission Medications:   Prior to Admission Medications   Prescriptions Last Dose Informant Patient Reported? Taking?   acetaminophen-codeine (TYLENOL #3) 300-30 mg per tablet   Yes Yes   Sig: Take 1 Tab by mouth every six (6) hours as needed for Pain. albuterol (PROVENTIL HFA, VENTOLIN HFA, PROAIR HFA) 90 mcg/actuation inhaler   Yes Yes   Sig: Take 2 Puffs by inhalation every four (4) hours as needed. aspirin delayed-release 81 mg tablet 7/2/2017 at Unknown time  Yes Yes   Sig: Take 81 mg by mouth daily. atorvastatin (LIPITOR) 20 mg tablet 7/1/2017 at Unknown time  Yes Yes   Sig: Take 80 mg by mouth nightly. bumetanide (BUMEX) 1 mg tablet 7/2/2017 at Unknown time  Yes Yes   Sig: Take 1 mg by mouth daily. carvedilol (COREG) 25 mg tablet 7/2/2017 at Unknown time  Yes Yes   Sig: Take 25 mg by mouth two (2) times daily (with meals). diclofenac (SOLARAZE) 3 % topical gel   Yes Yes   Sig: Apply 1 Each to affected area daily. ferrous sulfate 325 mg (65 mg iron) tablet 7/2/2017 at Unknown time  Yes Yes   Sig: Take 325 mg by mouth Daily (before breakfast). hydrALAZINE (APRESOLINE) 25 mg tablet 7/2/2017 at Unknown time  Yes Yes   Sig: Take 12.5 mg by mouth two (2) times a day. Facility-Administered Medications: None         Comments/Recommendations: Updated medication list based on list from home.     Removed: HCTZ, Lidocaine Patch, Losartan    Changed: Carvedilol from 6.25 mg to 25 mg bid, Hydralazine from 5 mg bid to 12.5 mg bid, Ferrous Sulfate from tid to daily    Add: Tylenol w/Codeine, Diclofenac gel    Mayte Pitt, PharmD

## 2017-07-02 NOTE — IP AVS SNAPSHOT
[address] 
[phone] Patient: Jeanne Schwartz Sr. MRN: [id number] EST:8/1/2015 You are allergic to the following Allergen Reactions Penicillins Itching Recent Documentation Height Weight BMI Smoking Status 1.727 m 91 kg 30.5 kg/m2 Never Smoker Emergency Contacts Name Discharge Info Relation Home Work Mobile Marlo Ugalde DISCHARGE CAREGIVER [3] Child [2] [phone] About your hospitalization You were admitted on:  July 2, 2017 You last received care in the:  [facility] 4 CV SERVICES UNIT You were discharged on:  July 3, 2017 Unit phone number:  [phone] Why you were hospitalized Your primary diagnosis was:  Chest Pain Providers Seen During Your Hospitalizations Provider Role Specialty Primary office phone Sonia Oden MD Attending Provider Emergency Medicine [phone] Mac Bustamante MD Attending Provider Internal Medicine [phone] Parag Tang MD Attending Provider Internal Medicine [phone] Your Primary Care Physician (PCP) Primary Care Physician Office Phone Office Fax Crys Salgado [phone] Follow-up Information Follow up With Details Comments Contact Info [facility] On 7/5/2017 If you have not heard from the home health agency by 11:00 am, please contact them. [address] [address] 
[phone] Mica Reed MD In 1 week hospital follow up [address] [place] [address] 
[phone] Cassie Person MD   [address] [place] [address] [address] [place] [address] 
[phone] Current Discharge Medication List  
  
START taking these medications Dose & Instructions Dispensing Information Comments Morning Noon Evening Bedtime  
 isosorbide mononitrate ER 30 mg tablet Commonly known as:  IMDUR Your last dose was: Your next dose is:    
   
   
 Dose:  30 mg Take 1 Tab by mouth daily. Quantity:  30 Tab Refills:  3  
     
   
   
   
  
 nitroglycerin 0.4 mg SL tablet Commonly known as:  NITROSTAT Your last dose was: Your next dose is:    
   
   
 Dose:  0.4 mg  
1 Tab by SubLINGual route every five (5) minutes as needed for Chest Pain for up to 3 doses. Quantity:  1 Bottle Refills:  3 CONTINUE these medications which have NOT CHANGED Dose & Instructions Dispensing Information Comments Morning Noon Evening Bedtime  
 acetaminophen-codeine 300-30 mg per tablet Commonly known as:  TYLENOL #3 Your last dose was: Your next dose is:    
   
   
 Dose:  1 Tab Take 1 Tab by mouth every six (6) hours as needed for Pain. Refills:  0  
     
   
   
   
  
 albuterol 90 mcg/actuation inhaler Commonly known as:  PROVENTIL HFA, VENTOLIN HFA, PROAIR HFA Your last dose was: Your next dose is:    
   
   
 Dose:  2 Puff Take 2 Puffs by inhalation every four (4) hours as needed. Refills:  0  
     
   
   
   
  
 aspirin delayed-release 81 mg tablet Your last dose was: Your next dose is:    
   
   
 Dose:  81 mg Take 81 mg by mouth daily. Refills:  0  
     
   
   
   
  
 atorvastatin 20 mg tablet Commonly known as:  LIPITOR Your last dose was: Your next dose is:    
   
   
 Dose:  80 mg Take 80 mg by mouth nightly. Refills:  0  
     
   
   
   
  
 bumetanide 1 mg tablet Commonly known as:  Tura Maricruz Your last dose was: Your next dose is:    
   
   
 Dose:  1 mg Take 1 mg by mouth daily. Refills:  0  
     
   
   
   
  
 carvedilol 25 mg tablet Commonly known as:  Ronald Roy Your last dose was: Your next dose is:    
   
   
 Dose:  25 mg Take 25 mg by mouth two (2) times daily (with meals). Refills:  0  
     
   
   
   
  
 diclofenac 3 % topical gel Commonly known as:  [place] Rob Your last dose was: Your next dose is:    
   
   
 Dose:  1 Each Apply 1 Each to affected area daily. Refills:  0  
     
   
   
   
  
 ferrous sulfate 325 mg (65 mg iron) tablet Your last dose was: Your next dose is:    
   
   
 Dose:  325 mg Take 325 mg by mouth Daily (before breakfast). Refills:  0  
     
   
   
   
  
 hydrALAZINE 25 mg tablet Commonly known as:  APRESOLINE Your last dose was: Your next dose is:    
   
   
 Dose:  12.5 mg Take 12.5 mg by mouth two (2) times a day. Refills:  0 Where to Get Your Medications Information on where to get these meds will be given to you by the nurse or doctor. ! Ask your nurse or doctor about these medications  
  isosorbide mononitrate ER 30 mg tablet  
 nitroglycerin 0.4 mg SL tablet Discharge Instructions Discharge Instructions PATIENT ID: Breezy Brunner Sr. MRN: [id number] YOB: 1930 DATE OF ADMISSION: 7/2/2017  3:00 PM   
DATE OF DISCHARGE: 7/3/2017 PRIMARY CARE PROVIDER: Loki Nina MD  
 
ATTENDING PHYSICIAN: Pina Coyne MD 
DISCHARGING PROVIDER: Bairon Copeland NP To contact this individual call [phone] and ask the  to page. If unavailable ask to be transferred the Adult Hospitalist Department. DISCHARGE DIAGNOSES Chest pain CONSULTATIONS: IP CONSULT TO CARDIOLOGY PROCEDURES/SURGERIES: Cardiac Cath scheduled for Friday 7/7/2017: please arrive at 0700. PENDING TEST RESULTS:  
At the time of discharge the following test results are still pending: none FOLLOW UP APPOINTMENTS:  
Follow-up Information Follow up With Details Comments Contact Info  [address] [facility]  If you have not heard from the home health agency by 11:00 am, please contact them. [address] [address] 
[phone] Eli Dobson MD In 1 week hospital follow up [address] [place] [address] 
[phone] Ruben Canas MD   [address] [place] [address] 
[phone] ADDITIONAL CARE RECOMMENDATIONS:  
1. Return to the hospital on Friday as directed by Dr Chanel Baker for your outpatient cardiac cath. 2. Follow up with your primary care provider within 1 week. DIET: Cardiac ACTIVITY: as tolerated WOUND CARE: none EQUIPMENT needed: none DISCHARGE MEDICATIONS: 
 See Medication Reconciliation Form · It is important that you take the medication exactly as they are prescribed. · Keep your medication in the bottles provided by the pharmacist and keep a list of the medication names, dosages, and times to be taken in your wallet. · Do not take other medications without consulting your doctor. NOTIFY YOUR PHYSICIAN FOR ANY OF THE FOLLOWING:  
Fever over 101 degrees for 24 hours. Chest pain, shortness of breath, fever, chills, nausea, vomiting, diarrhea, change in mentation, falling, weakness, bleeding. Severe pain or pain not relieved by medications. Or, any other signs or symptoms that you may have questions about. DISPOSITION: 
X  Home With: 
 OT  PT  [place]  RN  
  
 SNF/Inpatient Rehab/LTAC Independent/assisted living Hospice Other: CDMP Checked: Yes X PROBLEM LIST Updated: Yes X Signed:  
Bari Agarwal NP 
7/3/2017 
4:40 PM 
 
Discharge Orders None [facility] Announcement We are excited to announce that we are making your provider's discharge notes available to you in [employer]. You will see these notes when they are completed and signed by the physician that discharged you from your recent hospital stay.   If you have any questions or concerns about any information you see in [employer], please call the Health Information Department where you were seen or reach out to your Primary Care Provider for more information about your plan of care. Introducing [facility]! [facility] introduces [employer] patient portal. Now you can access parts of your medical record, email your doctor's office, and request medication refills online. 1. In your internet browser, go to [email or web address] 2. Click on the First Time User? Click Here link in the Sign In box. You will see the New Member Sign Up page. 3. Enter your [employer] Access Code exactly as it appears below. You will not need to use this code after youve completed the sign-up process. If you do not sign up before the expiration date, you must request a new code. · [employer] Access Code: [email or web address] Expires: 9/11/2017 10:31 AM 
 
4. Enter the last four digits of your Social Security Number (xxxx) and Date of Birth (mm/dd/yyyy) as indicated and click Submit. You will be taken to the next sign-up page. 5. Create a [employer] ID. This will be your [employer] login ID and cannot be changed, so think of one that is secure and easy to remember. 6. Create a [employer] password. You can change your password at any time. 7. Enter your Password Reset Question and Answer. This can be used at a later time if you forget your password. 8. Enter your e-mail address. You will receive e-mail notification when new information is available in [address] 9. Click Sign Up. You can now view and download portions of your medical record. 10. Click the Download Summary menu link to download a portable copy of your medical information. If you have questions, please visit the Frequently Asked Questions section of the [employer] website. Remember, [employer] is NOT to be used for urgent needs. For medical emergencies, dial 911. Now available from your iPhone and Android! General Information Please provide this summary of care documentation to your next provider. Patient Signature:  ____________________________________________________________ Date:  ____________________________________________________________  
  
Darline Fines Provider Signature:  ____________________________________________________________ Date:  ____________________________________________________________

## 2017-07-02 NOTE — ED TRIAGE NOTES
Patient started with mid/left chest pain that began last night. Pains have been intermittent. Patient reports intermittent SOB and nausea as well.

## 2017-07-03 ENCOUNTER — HOME HEALTH ADMISSION (OUTPATIENT)
Dept: HOME HEALTH SERVICES | Facility: HOME HEALTH | Age: 82
End: 2017-07-03

## 2017-07-03 ENCOUNTER — APPOINTMENT (OUTPATIENT)
Dept: NUCLEAR MEDICINE | Age: 82
End: 2017-07-03
Attending: INTERNAL MEDICINE
Payer: MEDICARE

## 2017-07-03 VITALS
OXYGEN SATURATION: 96 % | BODY MASS INDEX: 30.41 KG/M2 | DIASTOLIC BLOOD PRESSURE: 65 MMHG | RESPIRATION RATE: 18 BRPM | TEMPERATURE: 97.3 F | HEIGHT: 68 IN | WEIGHT: 200.62 LBS | HEART RATE: 71 BPM | SYSTOLIC BLOOD PRESSURE: 149 MMHG

## 2017-07-03 LAB
ALBUMIN SERPL BCP-MCNC: 2.6 G/DL (ref 3.5–5)
ALBUMIN/GLOB SERPL: 0.6 {RATIO} (ref 1.1–2.2)
ALP SERPL-CCNC: 99 U/L (ref 45–117)
ALT SERPL-CCNC: 9 U/L (ref 12–78)
ANION GAP BLD CALC-SCNC: 8 MMOL/L (ref 5–15)
AST SERPL W P-5'-P-CCNC: 8 U/L (ref 15–37)
ATRIAL RATE: 87 BPM
ATTENDING PHYSICIAN, CST07: NORMAL
BASOPHILS # BLD AUTO: 0 K/UL (ref 0–0.1)
BASOPHILS # BLD: 0 % (ref 0–1)
BILIRUB SERPL-MCNC: 0.5 MG/DL (ref 0.2–1)
BUN SERPL-MCNC: 16 MG/DL (ref 6–20)
BUN/CREAT SERPL: 11 (ref 12–20)
CALCIUM SERPL-MCNC: 8.5 MG/DL (ref 8.5–10.1)
CALCULATED R AXIS, ECG10: 11 DEGREES
CALCULATED T AXIS, ECG11: 74 DEGREES
CHLORIDE SERPL-SCNC: 108 MMOL/L (ref 97–108)
CK MB CFR SERPL CALC: NORMAL % (ref 0–2.5)
CK MB SERPL-MCNC: <1 NG/ML (ref 5–25)
CK SERPL-CCNC: 42 U/L (ref 39–308)
CO2 SERPL-SCNC: 24 MMOL/L (ref 21–32)
CREAT SERPL-MCNC: 1.51 MG/DL (ref 0.7–1.3)
DIAGNOSIS, 93000: NORMAL
DIAGNOSIS, 93000: NORMAL
DUKE TM SCORE RESULT, CST14: NORMAL
DUKE TREADMILL SCORE, CST13: NORMAL
ECG INTERP BEFORE EX, CST11: NORMAL
ECG INTERP DURING EX, CST12: NORMAL
EOSINOPHIL # BLD: 0.5 K/UL (ref 0–0.4)
EOSINOPHIL NFR BLD: 4 % (ref 0–7)
ERYTHROCYTE [DISTWIDTH] IN BLOOD BY AUTOMATED COUNT: 18.8 % (ref 11.5–14.5)
FUNCTIONAL CAPACITY, CST17: NORMAL
GLOBULIN SER CALC-MCNC: 4.3 G/DL (ref 2–4)
GLUCOSE SERPL-MCNC: 90 MG/DL (ref 65–100)
HCT VFR BLD AUTO: 31.6 % (ref 36.6–50.3)
HGB BLD-MCNC: 9.6 G/DL (ref 12.1–17)
KNOWN CARDIAC CONDITION, CST08: NORMAL
LYMPHOCYTES # BLD AUTO: 25 % (ref 12–49)
LYMPHOCYTES # BLD: 2.7 K/UL (ref 0.8–3.5)
MAGNESIUM SERPL-MCNC: 2.2 MG/DL (ref 1.6–2.4)
MAX. DIASTOLIC BP, CST04: 95 MMHG
MAX. HEART RATE, CST05: 90 BPM
MAX. SYSTOLIC BP, CST03: 164 MMHG
MCH RBC QN AUTO: 25.9 PG (ref 26–34)
MCHC RBC AUTO-ENTMCNC: 30.4 G/DL (ref 30–36.5)
MCV RBC AUTO: 85.4 FL (ref 80–99)
MONOCYTES # BLD: 1 K/UL (ref 0–1)
MONOCYTES NFR BLD AUTO: 10 % (ref 5–13)
NEUTS SEG # BLD: 6.4 K/UL (ref 1.8–8)
NEUTS SEG NFR BLD AUTO: 61 % (ref 32–75)
OVERALL BP RESPONSE TO EXERCISE, CST16: NORMAL
OVERALL HR RESPONSE TO EXERCISE, CST15: NORMAL
P-R INTERVAL, ECG05: 174 MS
PEAK EX METS, CST10: 1 METS
PHOSPHATE SERPL-MCNC: 3 MG/DL (ref 2.6–4.7)
PLATELET # BLD AUTO: 276 K/UL (ref 150–400)
POTASSIUM SERPL-SCNC: 3.9 MMOL/L (ref 3.5–5.1)
PROT SERPL-MCNC: 6.9 G/DL (ref 6.4–8.2)
PROTOCOL NAME, CST01: NORMAL
Q-T INTERVAL, ECG07: 396 MS
QRS DURATION, ECG06: 94 MS
QTC CALCULATION (BEZET), ECG08: 476 MS
RBC # BLD AUTO: 3.7 M/UL (ref 4.1–5.7)
SODIUM SERPL-SCNC: 140 MMOL/L (ref 136–145)
TEST INDICATION, CST09: NORMAL
TROPONIN I SERPL-MCNC: <0.04 NG/ML
TSH SERPL DL<=0.05 MIU/L-ACNC: 2.04 UIU/ML (ref 0.36–3.74)
VENTRICULAR RATE, ECG03: 87 BPM
WBC # BLD AUTO: 10.6 K/UL (ref 4.1–11.1)

## 2017-07-03 PROCEDURE — 85025 COMPLETE CBC W/AUTO DIFF WBC: CPT | Performed by: INTERNAL MEDICINE

## 2017-07-03 PROCEDURE — 84100 ASSAY OF PHOSPHORUS: CPT | Performed by: INTERNAL MEDICINE

## 2017-07-03 PROCEDURE — 74011250636 HC RX REV CODE- 250/636: Performed by: INTERNAL MEDICINE

## 2017-07-03 PROCEDURE — 80053 COMPREHEN METABOLIC PANEL: CPT | Performed by: INTERNAL MEDICINE

## 2017-07-03 PROCEDURE — 84443 ASSAY THYROID STIM HORMONE: CPT | Performed by: INTERNAL MEDICINE

## 2017-07-03 PROCEDURE — 96372 THER/PROPH/DIAG INJ SC/IM: CPT

## 2017-07-03 PROCEDURE — 83735 ASSAY OF MAGNESIUM: CPT | Performed by: INTERNAL MEDICINE

## 2017-07-03 PROCEDURE — 82550 ASSAY OF CK (CPK): CPT | Performed by: INTERNAL MEDICINE

## 2017-07-03 PROCEDURE — 74011250637 HC RX REV CODE- 250/637: Performed by: NURSE PRACTITIONER

## 2017-07-03 PROCEDURE — 36415 COLL VENOUS BLD VENIPUNCTURE: CPT | Performed by: INTERNAL MEDICINE

## 2017-07-03 PROCEDURE — A9500 TC99M SESTAMIBI: HCPCS

## 2017-07-03 PROCEDURE — 74011250637 HC RX REV CODE- 250/637: Performed by: INTERNAL MEDICINE

## 2017-07-03 PROCEDURE — 99218 HC RM OBSERVATION: CPT

## 2017-07-03 PROCEDURE — 93017 CV STRESS TEST TRACING ONLY: CPT

## 2017-07-03 PROCEDURE — 84484 ASSAY OF TROPONIN QUANT: CPT | Performed by: INTERNAL MEDICINE

## 2017-07-03 PROCEDURE — 93970 EXTREMITY STUDY: CPT

## 2017-07-03 RX ORDER — SODIUM CHLORIDE 0.9 % (FLUSH) 0.9 %
20 SYRINGE (ML) INJECTION
Status: COMPLETED | OUTPATIENT
Start: 2017-07-03 | End: 2017-07-03

## 2017-07-03 RX ORDER — ISOSORBIDE MONONITRATE 30 MG/1
30 TABLET, EXTENDED RELEASE ORAL DAILY
Qty: 30 TAB | Refills: 3 | Status: SHIPPED | OUTPATIENT
Start: 2017-07-03 | End: 2017-07-07

## 2017-07-03 RX ORDER — LOPERAMIDE HYDROCHLORIDE 2 MG/1
2 CAPSULE ORAL AS NEEDED
Status: DISCONTINUED | OUTPATIENT
Start: 2017-07-03 | End: 2017-07-03 | Stop reason: HOSPADM

## 2017-07-03 RX ORDER — NITROGLYCERIN 0.4 MG/1
0.4 TABLET SUBLINGUAL
Qty: 1 BOTTLE | Refills: 3 | Status: SHIPPED | OUTPATIENT
Start: 2017-07-03 | End: 2018-07-11

## 2017-07-03 RX ADMIN — CARVEDILOL 25 MG: 12.5 TABLET, FILM COATED ORAL at 12:35

## 2017-07-03 RX ADMIN — HEPARIN SODIUM 5000 UNITS: 5000 INJECTION, SOLUTION INTRAVENOUS; SUBCUTANEOUS at 06:33

## 2017-07-03 RX ADMIN — DOCUSATE SODIUM 100 MG: 100 CAPSULE, LIQUID FILLED ORAL at 12:36

## 2017-07-03 RX ADMIN — HYDRALAZINE HYDROCHLORIDE 12.5 MG: 25 TABLET, FILM COATED ORAL at 08:29

## 2017-07-03 RX ADMIN — BUMETANIDE 1 MG: 1 TABLET ORAL at 12:35

## 2017-07-03 RX ADMIN — LOPERAMIDE HYDROCHLORIDE 2 MG: 2 CAPSULE ORAL at 15:18

## 2017-07-03 RX ADMIN — Medication 20 ML: at 10:25

## 2017-07-03 RX ADMIN — HEPARIN SODIUM 5000 UNITS: 5000 INJECTION, SOLUTION INTRAVENOUS; SUBCUTANEOUS at 12:38

## 2017-07-03 RX ADMIN — ASPIRIN 81 MG: 81 TABLET, COATED ORAL at 12:40

## 2017-07-03 RX ADMIN — REGADENOSON 0.4 MG: 0.08 INJECTION, SOLUTION INTRAVENOUS at 10:25

## 2017-07-03 NOTE — PROGRESS NOTES
Bedside and Verbal shift change report given to [address] [place] [address] (oncoming nurse) by Cheryle Punt RN (offgoing nurse). Report included the following information SBAR, Kardex, MAR, Accordion and Recent Results.

## 2017-07-03 NOTE — PROGRESS NOTES
Care Management Note    Received page to see if home health has been set up. Referral was sent to [facility] and they accepted.   Updated on AVS.    Dale Matute RN, BSN, [facility]  ED Care Management  [phone]

## 2017-07-03 NOTE — DISCHARGE SUMMARY
Discharge Summary       PATIENT ID: Jackson Mercado Sr. MRN: [id number]   YOB: 1930    DATE OF ADMISSION: 7/2/2017  3:00 PM    DATE OF DISCHARGE: 7/3/2017   PRIMARY CARE PROVIDER: Edita Lazo MD     ATTENDING PHYSICIAN: Obie Meehan  DISCHARGING PROVIDER: Michelle Rothman NP    To contact this individual call [phone] and ask the  to page. If unavailable ask to be transferred the Adult Hospitalist Department. CONSULTATIONS: IP CONSULT TO CARDIOLOGY    PROCEDURES/SURGERIES: * No surgery found *    ADMITTING DIAGNOSES & HOSPITAL COURSE:   Dx: Chest pain    86-yom with a pmh for CAD s/p stent placement, congestive heart failure, HTN s/ppacemaker insertion, who was in her usual state of health until last night when the patient developed chest pain. This was located at the left side of the chest, intermittent. The chest pain happens every 30 minutes and lasts for about 10 minutes with radiation to the right side of the chest. No known aggravating or relieving factors, 7 over 10 in severity. Pt was brought to the ER for further evaluation. Pt is scheduled for stress test 07/10/2017.     7/3 Nuclear Stress showed Small, low-grade, reversible proximal lateral wall defect is suspicious for ischemia. Moderate size, low-grade, partially reversible inferior wall defect may represent soft tissue attenuation or myocardial scar with lizeth-infarct ischemia. Chest pain resolved. Patient seen by Dr Jimi Casanova his cardiologist, plan if for outpatient stress on Friday. Cleared by cardiology for discharge home. Patient stable for discharge home.      DISCHARGE DIAGNOSES / PLAN:      Chest pain-resolved  -troponin negative  -stress test abnormal plan for OP stress Friday  -d-dimer elevated however patient 96% on room air, denies any current chest pain, HR stable, dopplers negative, unable to VQ scan d/t receiving contrast for stress test, low suspicion for PE held on CTA d/t decrease kidney function  -continue home meds    Dyslipidemia  -continue home meds     Chronic Systolic Heart Failure NYHA class 2-3 on admission   -s/p PM placement  -does not appear in exacerbation  -continue home meds   -follow up with cardiology OP     HTN   -continue home meds     Leg swelling-resolved   -dopplers negative       PENDING TEST RESULTS:   At the time of discharge the following test results are still pending: none    FOLLOW UP APPOINTMENTS:    Follow-up Information     Follow up With Details Comments [address] [place] [address] Street  If you have not heard from the home health agency by 11:00 am, please contact them. [address], MD In 1 week hospital follow up [address]      Rafaela Tariq, [address] [facility]  [phone]             ADDITIONAL CARE RECOMMENDATIONS:   1. Return to the hospital on Friday as directed by Dr Marita Schroeder for your outpatient cardiac cath. 2. Follow up with your primary care provider within 1 week. DIET: Cardiac    ACTIVITY: as tolerated    WOUND CARE: none    EQUIPMENT needed: none      DISCHARGE MEDICATIONS:  Current Discharge Medication List      START taking these medications    Details   isosorbide mononitrate ER (IMDUR) 30 mg tablet Take 1 Tab by mouth daily. Qty: 30 Tab, Refills: 3      nitroglycerin (NITROSTAT) 0.4 mg SL tablet 1 Tab by SubLINGual route every five (5) minutes as needed for Chest Pain for up to 3 doses. Qty: 1 Bottle, Refills: 3         CONTINUE these medications which have NOT CHANGED    Details   carvedilol (COREG) 25 mg tablet Take 25 mg by mouth two (2) times daily (with meals). hydrALAZINE (APRESOLINE) 25 mg tablet Take 12.5 mg by mouth two (2) times a day. aspirin delayed-release 81 mg tablet Take 81 mg by mouth daily.       bumetanide (BUMEX) 1 mg tablet Take 1 mg by mouth daily.      atorvastatin (LIPITOR) 20 mg tablet Take 80 mg by mouth nightly. NOTIFY YOUR PHYSICIAN FOR ANY OF THE FOLLOWING:   Fever over 101 degrees for 24 hours. Chest pain, shortness of breath, fever, chills, nausea, vomiting, diarrhea, change in mentation, falling, weakness, bleeding. Severe pain or pain not relieved by medications. Or, any other signs or symptoms that you may have questions about. DISPOSITION:   X Home With:   OT  PT  HH  RN       Long term SNF/Inpatient Rehab    Independent/assisted living    Hospice    Other:       PATIENT CONDITION AT DISCHARGE:     Functional status    Poor     Deconditioned    X Independent      Cognition    X Lucid     Forgetful     Dementia      Catheters/lines (plus indication)    [place]     PICC     PEG    X None      Code status    X Full code     DNR      PHYSICAL EXAMINATION AT DISCHARGE:  General: No acute distress, cooperative, pleasant   EENT: Artifical left eye. Oral mucous moist, oropharynx benign  Resp: CTA bilaterally. No wheezing/rhonchi/rales. No accessory muscle use  CV: Regular rhythm, normal rate, no murmurs, gallops, rubs  GI: Soft, non distended, non tender. normoactive bowel sounds,   Extremities: No edema, warm, 2+ pulses throughout  Neurologic: Moves all extremities. AAOx3  Psych: Good insight. Not anxious nor agitated. Skin: Good Turgor, no rashes or ulcers      CHRONIC MEDICAL DIAGNOSES:  Problem List as of 7/3/2017  Date Reviewed: 7/2/2017          Codes Class Noted - Resolved    * (Principal)Chest pain ICD-10-CM: R07.9  ICD-9-CM: 786.50  7/2/2017 - Present        [facility] ICD-10-CM: J30.987Q, Z96.649  ICD-9-CM: 996.42, V43.64  7/31/2013 - Present    Overview Signed 7/31/2013  6:14 PM by Nathaniel Busby MD     Right hip dislocation             Manasa-prosthetic fracture around prosthetic hip (Chronic) ICD-10-CM: M97. [id number], [id number]  ICD-9-CM: 996.44, V43.64  5/30/2011 - Present        Dyslipidemia (Chronic) ICD-10-CM: E78.5  ICD-9-CM: 272.4  5/30/2011 - Present        HBP (high blood pressure) ICD-10-CM: I10  ICD-9-CM: 401.9  5/30/2011 - Present        Dislocated toe ICD-10-CM: Z39.322E  ICD-9-CM: 838.09  5/30/2011 - Present              Greater than 35 minutes were spent with the patient on counseling and coordination of care    Signed:   Michelle Rothman NP  7/3/2017  4:30 PM

## 2017-07-03 NOTE — ROUTINE PROCESS
TRANSFER - OUT REPORT:    Verbal report given to Kasie(name) on Antwon Stack.  being transferred to CVSU(unit) for routine progression of care       Report consisted of patients Situation, Background, Assessment and   Recommendations(SBAR). Information from the following report(s) SBAR, ED Summary, Intake/Output, MAR and Recent Results was reviewed with the receiving nurse. Lines:   Peripheral IV 07/02/17 Right Antecubital (Active)   Site Assessment Clean, dry, & intact 7/2/2017  3:12 PM   Phlebitis Assessment 0 7/2/2017  3:12 PM   Infiltration Assessment 0 7/2/2017  3:12 PM   Dressing Status Clean, dry, & intact 7/2/2017  3:12 PM   Dressing Type Transparent 7/2/2017  3:12 PM   Hub Color/Line Status Pink 7/2/2017  3:12 PM        Opportunity for questions and clarification was provided.       Patient transported with:   Monitor  Tech

## 2017-07-03 NOTE — H&P
[address] [place] [address], [address] [place] [address]   HISTORY AND PHYSICAL       Name:  Benny Herman   MR#:  [id number]   :  1930   Account #:  [de-identified]        Date of Adm:  2017       PRIMARY CARE PHYSICIAN: Dr. Stephanie Foster. SOURCE OF INFORMATION: The patient and the patient's daughter   who was present at the bedside. CHIEF COMPLAINT: Chest pain. HISTORY OF PRESENT ILLNESS: This is an 77-year-old man with a   past medical history significant for coronary artery disease, status post   stent placement, congestive heart failure, hypertension, status post   pacemaker insertion, who was in her usual state of health until last   night when the patient developed chest pain. This was located at the   left side of the chest, intermittent. The chest pain happens every 30   minutes and lasts for about 10 minutes with radiation to the right side   of the chest. No known aggravating or relieving factors, 7 over 10 in   severity. The patient was brought to the emergency room for further   evaluation. The patient is scheduled for stress test 07/10/2017. When   the patient arrived at the emergency room, he was evaluated by the   emergency room provider. His cardiology group was consulted. The   cardiologist advised admission to the hospitalist service. The patient   was referred to the hospitalist service for admission. PAST MEDICAL HISTORY: Coronary artery disease, status post stent   placement, congestive heart failure, hypertension, status post   pacemaker insertion. ALLERGIES: PENICILLIN. MEDICATIONS   1. Albuterol 90 mcg inhalation as needed for shortness of breath. 2. Aspirin 81 mg daily. 3. Lipitor 80 mg daily. 4. Bumex 1 mg daily. 5. Coreg 6.25 mg twice daily. 6.  5 mg twice daily. 7. Hydrochlorothiazide 25 mg daily. 8. Losartan 50 mg daily. FAMILY HISTORY: This was reviewed. No family history of coronary   artery disease.      PAST SURGICAL HISTORY: This is significant for bilateral hip   replacements, status post pacemaker insertion. SOCIAL HISTORY: No history of alcohol or tobacco abuse. REVIEW OF SYSTEMS   HEENT: No headache, no dizziness. No blurring of vision, no   photophobia. RESPIRATORY: No cough, no shortness of breath, no hemoptysis. CARDIOVASCULAR: No chest pain, no orthopnea, no palpitations. GASTROINTESTINAL: No nausea and vomiting. No diarrhea. No   constipation. GENITOURINARY: No dysuria, no urgency, and no frequency. All other systems are reviewed and they are negative. PHYSICAL EXAMINATION   GENERAL APPEARANCE: The patient appeared ill, in moderate   distress. VITAL SIGNS: On arrival to the emergency room, temperature 97.7,   pulse 91, respiratory rate of 15, blood pressure 149/92, oxygen   saturation 94% on room air. HEAD: Normocephalic, atraumatic. EYES: Normal eye movements. No redness, no drainage, no   discharge. EARS: Normal external ears with no obvious drainage. NOSE: No deformities or drainage. MOUTH AND THROAT: No visible oral lesions. NECK: Supple. No JVD, no thyromegaly. CHEST: Clear breath sounds. No wheezing, no crackles. HEART: Normal S1, S2. Regular. No clinically appreciable murmur. ABDOMEN: Soft, obese, nontender. Normal bowel sounds. CNS: Alert, oriented x3. No gross focal neurological deficits. EXTREMITIES: Edema 2+. Pulses 2+ bilaterally. MUSCULOSKELETAL: No obvious joint deformity and swelling. SKIN: No acute skin lesions seen in the exposed parts of the body. LYMPHATICS: No cervical lymphadenopathy. PSYCHIATRY: Normal mood and affect. DIAGNOSTIC DATA: EKG shows a sinus rhythm and nonspecific ST   and T wave abnormalities. Chest x-ray with no acute findings. LABORATORY DATA: BNP level 295. Coagulation profile - INR 1.1, PT   11.7, PTT 34.0.  Chemistry - sodium 139, potassium 4.2, chloride 102,   CO2 26, glucose 101, BUN 17, creatinine 1.89. Calcium 8.8, bilirubin   0.5. ALT 12, AST 11, alkaline phosphatase 118. Total protein 8.0. Albumin level 3.2, globulin at 4.8. Cardiac profile - troponin less than   0.04. Hematology - WBC 10.2, hemoglobin 11.0, hematocrit 35.4,   platelets of 328. ASSESSMENT   1. Chest pain. 2. Dyslipidemia. 3. Chronic systolic heart failure. 4. Chronic kidney disease, stage III. 5. Hypertension. 6. Status post pacemaker insertion. 7. Leg swelling. PLAN   1. Chest pain: Will place the patient on observation. Will obtain serial   cardiac markers. The patient's cardiologist has been consulted. Will   await further recommendations from the cardiologist. The patient will   also be evaluated for the atypical causes of the chest pain. Will check   an amylase and lipase level. Will also check a D-Dimer. If the D-Dimer   is significantly elevated, the patient will require the V/Q scan to   evaluate this patient for thromboembolism as the possible cause of   chest pain. 2. Dyslipidemia: Will resume the home medications. 3. Chronic systolic congestive heart failure: According to old records,   the patient's ejection fraction is 30%. Will resume preadmission   medications. Will await further recommendations from the cardiologist.   4. Chronic kidney disease, stage III: Will monitor the patient's renal   function. 5. Hypertension: Will resume home medications. 6. Status post pacemaker insertion: Will continue with the preadmission   medications. 7. Leg swelling: Will check ultrasound of the lower extremities for deep   vein thrombosis prophylaxis. OTHER ISSUES   CODE STATUS: The patient is a FULL CODE. Will place the patient on   heparin for DVT prophylaxis.          MD YOLY Jesus / MARY   D:  07/02/2017   17:57   T:  07/03/2017   11:22   Job #:  [id number]

## 2017-07-03 NOTE — DISCHARGE INSTRUCTIONS
Discharge Instructions       PATIENT ID: Odalis Gaming Sr. MRN: [id number]   YOB: 1930    DATE OF ADMISSION: 7/2/2017  3:00 PM    DATE OF DISCHARGE: 7/3/2017    PRIMARY CARE PROVIDER: Aric Ware MD     ATTENDING PHYSICIAN: Qamar Weiner MD  DISCHARGING PROVIDER: Andree Kanner, NP    To contact this individual call [phone] and ask the  to page. If unavailable ask to be transferred the Adult Hospitalist Department. DISCHARGE DIAGNOSES Chest pain    CONSULTATIONS: IP CONSULT TO CARDIOLOGY    PROCEDURES/SURGERIES: Cardiac Cath scheduled for Friday 7/7/2017: please arrive at 0700. PENDING TEST RESULTS:   At the time of discharge the following test results are still pending: none    FOLLOW UP APPOINTMENTS:   Follow-up Information     Follow up With Details Comments [address] [place] [address] Street  If you have not heard from the [place] [facility] agency by 11:00 am, please contact them. [address], [place] In 1 week hospital follow up [address]      Huseyin Solis, [address] [facility]  [phone]             ADDITIONAL CARE RECOMMENDATIONS:   1. Return to the hospital on Friday as directed by Dr Noah Curtis for your outpatient cardiac cath. 2. Follow up with your primary care provider within 1 week. DIET: Cardiac    ACTIVITY: as tolerated    WOUND CARE: none    EQUIPMENT needed: none    DISCHARGE MEDICATIONS:   See Medication Reconciliation Form    · It is important that you take the medication exactly as they are prescribed. · Keep your medication in the bottles provided by the pharmacist and keep a list of the medication names, dosages, and times to be taken in your wallet. · Do not take other medications without consulting your doctor.        NOTIFY YOUR PHYSICIAN FOR ANY OF THE FOLLOWING:   Fever over 101 degrees for 24 hours. Chest pain, shortness of breath, fever, chills, nausea, vomiting, diarrhea, change in mentation, falling, weakness, bleeding. Severe pain or pain not relieved by medications. Or, any other signs or symptoms that you may have questions about. DISPOSITION:  X  Home With:   OT  PT  HH  RN       SNF/Inpatient Rehab/LTAC    Independent/assisted living    Hospice    Other:     CDMP Checked: Yes X     PROBLEM LIST Updated:   Yes X       Signed:   Anastasiya Roy NP  7/3/2017  4:40 PM

## 2017-07-03 NOTE — PROGRESS NOTES
7/3/2017     Admit Date: 7/2/2017    Admit Diagnosis: Chest pain    Principal Problem:    Chest pain (7/2/2017)        Assessment/Plan:  1. Saw Mr Angela Byrd this morning. Subsequently he had his pharm MPI which was positive showing inferior and lateral ischemia. I tried to put his cath on for this afternoon but the schedule could not accommodate me. I spoke with Mr Angela Byrd and explained the situation. He is stable at present and is comfortable going home until he can have his cath on Friday 7/7/17 at 8 AM. I spoke with his daughter Mrs Negra Rhodes and she is also comfortable with her dad going home and coming back as an outpatient. I have advised her to call the rescue squad and come back to the ED if he has more chest pain before his elective cath. Plan:  Discharge home. I have reconciled his cardiac medications. Thank you  Cassie Person MD     Subjective:  Feels well today       Marciano Kemp. denies chest pain, chest pressure/discomfort. Objective:     Visit Vitals    /65 (BP 1 Location: Left arm, BP Patient Position: At rest)    Pulse 71    Temp 97.3 °F (36.3 °C)    Resp 18    Ht 5' 8\" (1.727 m)    Wt 91 kg (200 lb 9.9 oz)    SpO2 96%    BMI 30.5 kg/m2        Physical Exam:  Neck: supple, symmetrical, trachea midline, no adenopathy, thyroid: not enlarged, symmetric, no tenderness/mass/nodules, no carotid bruit and no JVD  Heart: regular rate and rhythm, S1, S2 normal, S4 present  Lungs: clear to auscultation bilaterally, normal percussion bilaterally  Abdomen: soft, non-tender.  Bowel sounds normal. No masses,  no organomegaly  Extremities: extremities normal, atraumatic, no cyanosis or edema  Pulses: 2+ and symmetric  Neurologic: Grossly normal      Labs:    Recent Labs      07/03/17   0134   CPK  42   CKMB  <1.0   TROIQ  <0.04     Recent Labs      07/03/17   0134   NA  140   K  3.9   CL  108   BUN  16   CREA  1.51*   GLU  90   PHOS  3.0   CA  8.5     Recent Labs 07/03/17   0134   WBC  10.6   HGB  9.6*   HCT  31.6*   PLT  276     No results for input(s): CHOL, LDLC in the last 72 hours.     No lab exists for component: TGL, HDLC,  HBA1C    Telemetry: normal sinus rhythm     Data Review: current meds, labs,recent radiology, intake/output/weight and problem list reviewed

## 2017-07-03 NOTE — PROGRESS NOTES
Patient with a PMH for CAD and CHF with an EF of 40% was admitted for chest pain. Care manager met with patient to explain role and discuss transitions of care. Patient lives alone and has a supportive daughter Marco Spicer [phone] who takes him to his appointments. Per patient he uses a walker and has [facility] in the past. He confirmed his PCP to be Dr Regino Amor and he sees him monthly and uses the local [facility] as his pharmacy. Care management will follow for additional discharge planning needs. Lamin Medina RN,CRM  Care Management Interventions  PCP Verified by CM:  Yes (Dr Regino Amor)  Transition of Care Consult (CM Consult): [address] [place]: No  Discharge Durable Medical Equipment: No  Physical Therapy Consult: No  Occupational Therapy Consult: No  Speech Therapy Consult: No  Current Support Network: Lives Alone (Daughter Marco Spicer [phone])

## 2017-07-03 NOTE — PROGRESS NOTES
0730: Bedside shift change report given to Ralph Closs, RN  (oncoming nurse) by Sonali Dugan  (offgoing nurse). Report included the following information SBAR, Kardex, MAR, Accordion and Recent Results. 1130:  Patient arrived back from stress test.   1230: Imelda Reasoner assessing patient. Gave verbal ok to give his meds and resume regular diet  1530 Bedside shift change report given to Trace Khan  (oncoming nurse) by Ralph Closs, RN  (offgoing nurse). Report included the following information SBAR, Kardex, MAR, Accordion and Recent Results.

## 2017-07-03 NOTE — PROGRESS NOTES
1645: patient walking 200 feet with nursing. Pre heart rate 99, saturations 99%  Post heart rate 76, saturations 99%    1650: tele removed.

## 2017-07-03 NOTE — PROCEDURES
[facility]  *** FINAL REPORT ***    Name: Yoly Chawla  MRN: [id number]    Outpatient  : 1930  HIS Order #: [id number]  [id number] [facility] Visit #: [id number]  Date: 2017    TYPE OF TEST: Peripheral Venous Testing    REASON FOR TEST  Pain in limb, Limb swelling    Right Leg:-  Deep venous thrombosis:           No  Superficial venous thrombosis:    No  Deep venous insufficiency:        Not examined  Superficial venous insufficiency: Not examined    Left Leg:-  Deep venous thrombosis:           No  Superficial venous thrombosis:    No  Deep venous insufficiency:        Not examined  Superficial venous insufficiency: Not examined      INTERPRETATION/FINDINGS  PROCEDURE:  Color duplex ultrasound imaging of lower extremity veins. FINDINGS:       Right: The common femoral, deep femoral, femoral, popliteal,  posterior tibial, peroneal, and great saphenous are patent and without   evidence of thrombus;  each is fully compressible and there is no  narrowing of the flow channel on color Doppler imaging. Phasic flow  is observed in the common femoral vein. Left:   The common femoral, deep femoral, femoral, popliteal,  posterior tibial, peroneal, and great saphenous are patent and without   evidence of thrombus;  each is fully compressible and there is no  narrowing of the flow channel on color Doppler imaging. Phasic flow  is observed in the common femoral vein. IMPRESSION:  No evidence of right or left lower extremity vein  thrombosis. ADDITIONAL COMMENTS    I have personally reviewed the data relevant to the interpretation of  this  study.     TECHNOLOGIST: Bonnee Romberg, RVT  Signed: 2017 11:08 AM    PHYSICIAN: Grant Carrasco MD  Signed: 2017 05:07 PM

## 2017-07-03 NOTE — CARDIO/PULMONARY
Cardiac Wellness: Lori Stress Tset brochure given to Dorota Guerrero. . Teach back method used. Discussed stress test procedure and purpose. He reports that the stress test was just completed and he was asking about breakfast. Letty Bowden RN was notified.

## 2017-07-05 ENCOUNTER — HOME CARE VISIT (OUTPATIENT)
Dept: HOME HEALTH SERVICES | Facility: HOME HEALTH | Age: 82
End: 2017-07-05

## 2017-07-06 ENCOUNTER — HOME CARE VISIT (OUTPATIENT)
Dept: SCHEDULING | Facility: HOME HEALTH | Age: 82
End: 2017-07-06

## 2017-07-06 PROCEDURE — G0299 HHS/HOSPICE OF RN EA 15 MIN: HCPCS

## 2017-07-07 ENCOUNTER — HOSPITAL ENCOUNTER (OUTPATIENT)
Dept: CARDIAC CATH/INVASIVE PROCEDURES | Age: 82
Discharge: HOME | End: 2017-07-07
Attending: INTERNAL MEDICINE | Admitting: INTERNAL MEDICINE
Payer: MEDICARE

## 2017-07-07 VITALS
RESPIRATION RATE: 18 BRPM | BODY MASS INDEX: 31.07 KG/M2 | SYSTOLIC BLOOD PRESSURE: 142 MMHG | TEMPERATURE: 97.7 F | WEIGHT: 205 LBS | DIASTOLIC BLOOD PRESSURE: 88 MMHG | HEART RATE: 73 BPM | HEIGHT: 68 IN | OXYGEN SATURATION: 100 %

## 2017-07-07 PROCEDURE — 74011250636 HC RX REV CODE- 250/636: Performed by: INTERNAL MEDICINE

## 2017-07-07 PROCEDURE — 74011636320 HC RX REV CODE- 636/320: Performed by: INTERNAL MEDICINE

## 2017-07-07 PROCEDURE — 93454 CORONARY ARTERY ANGIO S&I: CPT

## 2017-07-07 PROCEDURE — C1760 CLOSURE DEV, VASC: HCPCS

## 2017-07-07 PROCEDURE — C1894 INTRO/SHEATH, NON-LASER: HCPCS

## 2017-07-07 PROCEDURE — 77030004533 HC CATH ANGI DX IMP BSC -B

## 2017-07-07 PROCEDURE — 77030013744

## 2017-07-07 PROCEDURE — 74011000250 HC RX REV CODE- 250: Performed by: INTERNAL MEDICINE

## 2017-07-07 RX ORDER — SODIUM CHLORIDE 9 MG/ML
1.5 INJECTION, SOLUTION INTRAVENOUS CONTINUOUS
Status: DISCONTINUED | OUTPATIENT
Start: 2017-07-07 | End: 2017-07-07 | Stop reason: HOSPADM

## 2017-07-07 RX ORDER — HEPARIN SODIUM 1000 [USP'U]/ML
1000-5000 INJECTION, SOLUTION INTRAVENOUS; SUBCUTANEOUS AS NEEDED
Status: DISCONTINUED | OUTPATIENT
Start: 2017-07-07 | End: 2017-07-07

## 2017-07-07 RX ORDER — RANOLAZINE 500 MG/1
500 TABLET, EXTENDED RELEASE ORAL 2 TIMES DAILY
Qty: 60 TAB | Refills: 5 | Status: ON HOLD | OUTPATIENT
Start: 2017-07-07 | End: 2017-08-11

## 2017-07-07 RX ORDER — FENTANYL CITRATE 50 UG/ML
25-100 INJECTION, SOLUTION INTRAMUSCULAR; INTRAVENOUS
Status: DISCONTINUED | OUTPATIENT
Start: 2017-07-07 | End: 2017-07-07

## 2017-07-07 RX ORDER — SODIUM CHLORIDE 9 MG/ML
3 INJECTION, SOLUTION INTRAVENOUS CONTINUOUS
Status: DISPENSED | OUTPATIENT
Start: 2017-07-07 | End: 2017-07-07

## 2017-07-07 RX ORDER — HEPARIN SODIUM 200 [USP'U]/100ML
1000 INJECTION, SOLUTION INTRAVENOUS AS NEEDED
Status: DISCONTINUED | OUTPATIENT
Start: 2017-07-07 | End: 2017-07-07

## 2017-07-07 RX ORDER — MIDAZOLAM HYDROCHLORIDE 1 MG/ML
.5-5 INJECTION, SOLUTION INTRAMUSCULAR; INTRAVENOUS
Status: DISCONTINUED | OUTPATIENT
Start: 2017-07-07 | End: 2017-07-07

## 2017-07-07 RX ORDER — ATROPINE SULFATE 0.1 MG/ML
1 INJECTION INTRAVENOUS AS NEEDED
Status: DISCONTINUED | OUTPATIENT
Start: 2017-07-07 | End: 2017-07-07

## 2017-07-07 RX ORDER — SODIUM CHLORIDE 0.9 % (FLUSH) 0.9 %
5-10 SYRINGE (ML) INJECTION AS NEEDED
Status: DISCONTINUED | OUTPATIENT
Start: 2017-07-07 | End: 2017-07-07

## 2017-07-07 RX ORDER — VERAPAMIL HYDROCHLORIDE 2.5 MG/ML
2.5 INJECTION, SOLUTION INTRAVENOUS
Status: DISCONTINUED | OUTPATIENT
Start: 2017-07-07 | End: 2017-07-07

## 2017-07-07 RX ORDER — LIDOCAINE HYDROCHLORIDE 10 MG/ML
10 INJECTION INFILTRATION; PERINEURAL
Status: DISCONTINUED | OUTPATIENT
Start: 2017-07-07 | End: 2017-07-07

## 2017-07-07 RX ADMIN — FENTANYL CITRATE 25 MCG: 50 INJECTION, SOLUTION INTRAMUSCULAR; INTRAVENOUS at 08:07

## 2017-07-07 RX ADMIN — MIDAZOLAM HYDROCHLORIDE 1 MG: 1 INJECTION, SOLUTION INTRAMUSCULAR; INTRAVENOUS at 08:07

## 2017-07-07 RX ADMIN — SODIUM CHLORIDE 1.5 ML/KG/HR: 900 INJECTION, SOLUTION INTRAVENOUS at 08:46

## 2017-07-07 RX ADMIN — HEPARIN SODIUM 2000 UNITS: 200 INJECTION, SOLUTION INTRAVENOUS at 08:00

## 2017-07-07 RX ADMIN — SODIUM CHLORIDE 3 ML/KG/HR: 900 INJECTION, SOLUTION INTRAVENOUS at 07:44

## 2017-07-07 RX ADMIN — IOPAMIDOL 79 ML: 755 INJECTION, SOLUTION INTRAVENOUS at 08:24

## 2017-07-07 RX ADMIN — LIDOCAINE HYDROCHLORIDE 10 ML: 10 INJECTION, SOLUTION INFILTRATION; PERINEURAL at 08:10

## 2017-07-07 NOTE — IP AVS SNAPSHOT
Current Discharge Medication List  
  
START taking these medications Dose & Instructions Dispensing Information Comments Morning Noon Evening Bedtime  
 ranolazine  mg SR tablet Commonly known as:  RANEXA Your last dose was: Your next dose is:    
   
   
 Dose:  500 mg Take 1 Tab by mouth two (2) times a day. Indications: CHRONIC STABLE ANGINA PECTORIS Quantity:  60 Tab Refills:  5 CONTINUE these medications which have NOT CHANGED Dose & Instructions Dispensing Information Comments Morning Noon Evening Bedtime  
 acetaminophen-codeine 300-30 mg per tablet Commonly known as:  TYLENOL #3 Your last dose was: Your next dose is:    
   
   
 Dose:  1 Tab Take 1 Tab by mouth every six (6) hours as needed for Pain. Refills:  0  
     
   
   
   
  
 albuterol 90 mcg/actuation inhaler Commonly known as:  PROVENTIL HFA, VENTOLIN HFA, PROAIR HFA Your last dose was: Your next dose is:    
   
   
 Dose:  2 Puff Take 2 Puffs by inhalation every four (4) hours as needed. Refills:  0  
     
   
   
   
  
 aspirin delayed-release 81 mg tablet Your last dose was: Your next dose is:    
   
   
 Dose:  81 mg Take 81 mg by mouth daily. Refills:  0  
     
   
   
   
  
 atorvastatin 20 mg tablet Commonly known as:  LIPITOR Your last dose was: Your next dose is:    
   
   
 Dose:  80 mg Take 80 mg by mouth nightly. Refills:  0  
     
   
   
   
  
 bumetanide 1 mg tablet Commonly known as:  Ole Ackworth Your last dose was: Your next dose is:    
   
   
 Dose:  1 mg Take 1 mg by mouth daily. Refills:  0  
     
   
   
   
  
 carvedilol 25 mg tablet Commonly known as:  Ramos Heal Your last dose was: Your next dose is:    
   
   
 Dose:  25 mg Take 25 mg by mouth two (2) times daily (with meals). Refills:  0 diclofenac 3 % topical gel Commonly known as:  Ra Said Your last dose was: Your next dose is:    
   
   
 Dose:  1 Each Apply 1 Each to affected area daily. Refills:  0  
     
   
   
   
  
 ferrous sulfate 325 mg (65 mg iron) tablet Your last dose was: Your next dose is:    
   
   
 Dose:  325 mg Take 325 mg by mouth Daily (before breakfast). Refills:  0  
     
   
   
   
  
 hydrALAZINE 25 mg tablet Commonly known as:  APRESOLINE Your last dose was: Your next dose is:    
   
   
 Dose:  12.5 mg Take 12.5 mg by mouth two (2) times a day. Refills:  0  
     
   
   
   
  
 nitroglycerin 0.4 mg SL tablet Commonly known as:  NITROSTAT Your last dose was: Your next dose is:    
   
   
 Dose:  0.4 mg  
1 Tab by SubLINGual route every five (5) minutes as needed for Chest Pain for up to 3 doses. Quantity:  1 Bottle Refills:  3 STOP taking these medications   
 isosorbide mononitrate ER 30 mg tablet Commonly known as:  IMDUR  
   
  
  
ASK your doctor about these medications Dose & Instructions Dispensing Information Comments Morning Noon Evening Bedtime QVAR 80 mcg/actuation [employer] Generic drug:  beclomethasone Your last dose was: Your next dose is:    
   
   
 Dose:  2 Puff Take 2 Puffs by inhalation two (2) times a day. Refills:  0 Where to Get Your Medications Information on where to get these meds will be given to you by the nurse or doctor. ! Ask your nurse or doctor about these medications  
  ranolazine  mg SR tablet

## 2017-07-07 NOTE — PROCEDURES
Cardiac Catheterization Procedure Note   Patient: Gallito Márquez Sr. MRN: [id number]  SSN: [id number]   YOB: 1930 Age: 80 y.o.   Sex: male    Date of Procedure: 7/7/2017   Pre-procedure Diagnosis: Chest pain CCS Class III  Post-procedure Diagnosis: Coronary Artery Disease  Procedure: Left Heart Cath  :  Dr. Ariela Byrne MD    Assistant(s):  None  Anesthesia: Moderate Sedation   Estimated Blood Loss: Less than 10 mL   Specimens Removed: None  Findings: moderate 50% proximal CX stenosis, severe stenosis of the very distal end of OM3, nonocclusive disease of the LAD, normal nondominant RCA, normal LV filling pressure  Complications: None   Implants:  None  Signed by:  Ariela Byrne MD  7/7/2017  8:39 AM

## 2017-07-07 NOTE — PROGRESS NOTES
TRANSFER - OUT REPORT:    Verbal report given to Dale Woodall CVT (name) on Lilli Espino.  being transferred to cath lab recovery room (unit) for routine progression of care       Report consisted of patients Situation, Background, Assessment and   Recommendations(SBAR). Information from the following report(s) SBAR, Procedure Summary and MAR was reviewed with the receiving nurse. Lines:   Peripheral IV 07/07/17 Right Arm (Active)   Site Assessment Clean, dry, & intact 7/7/2017  7:41 AM   Phlebitis Assessment 0 7/7/2017  7:41 AM   Infiltration Assessment 0 7/7/2017  7:41 AM   Dressing Status Clean, dry, & intact 7/7/2017  7:41 AM   Dressing Type Transparent 7/7/2017  7:41 AM   Hub Color/Line Status Yellow 7/7/2017  7:41 AM        Opportunity for questions and clarification was provided.       Patient transported with:   [employer]

## 2017-07-07 NOTE — ROUTINE PROCESS
Cardiac Cath Lab Recovery Arrival Note:      Esperanza Gillespie. arrived to Cardiac Cath Lab, Recovery Area. Staff introduced to patient. Patient identifiers verified with NAME and DATE OF BIRTH. Procedure verified with patient. Consent forms reviewed and signed by patient or authorized representative and verified. Allergies verified. Patient and family oriented to department. Patient and family informed of procedure and plan of care. Questions answered with review. Patient prepped for procedure, per orders from physician, prior to arrival.    Patient on cardiac monitor, non-invasive blood pressure, SPO2 monitor. On RA. Patient is A&Ox 4. Patient reports no pain. Patient in stretcher, in low position, with side rails up, call bell within reach, patient instructed to call if assistance as needed. Patient prep in: [address], [place] [address]. Patient family has pager #   Family in: . Prep by: AURORA Palomares RN

## 2017-07-07 NOTE — PROGRESS NOTES
Cardiac Cath Lab Procedure Area Arrival Note:    Tsering Pryor. arrived to Cardiac Cath Lab, Procedure Area. Patient identifiers verified with NAME and DATE OF BIRTH. Procedure verified with patient. Consent forms verified. Allergies verified. Patient informed of procedure and plan of care. Questions answered with review. Patient voiced understanding of procedure and plan of care. Patient on cardiac monitor, non-invasive blood pressure, SPO2 monitor. On room air then placed on O2 @ 2 lpm via NC.  IV of normal saline on pump at 279 ml/hr. Patient status doing well without problems. Patient is A&Ox 3. Patient reports no pain. Patient medicated during procedure with orders obtained and verified by Dr. Ara Jimenez. Refer to patients Cardiac Cath Lab PROCEDURE REPORT for vital signs, assessment, status, and response during procedure, printed at end of case. Printed report on chart or scanned into chart.

## 2017-07-07 NOTE — IP AVS SNAPSHOT
[address] 
[phone] Patient: Oriana Rock Sr. MRN: [id number] XCD:6/3/5140 You are allergic to the following Allergen Reactions Penicillins Itching Recent Documentation Height Weight BMI Smoking Status 1.727 m 93 kg 31.17 kg/m2 Never Smoker Emergency Contacts Name Discharge Info Relation Home Work Mobile [employer] DISCHARGE CAREGIVER [3] Child [2] [phone] About your hospitalization You were admitted on:  July 7, 2017 You last received care in the:  [address] CATH LAB You were discharged on:  July 7, 2017 Unit phone number:  [phone] Why you were hospitalized Your primary diagnosis was:  Not on File Providers Seen During Your Hospitalizations Provider Role Specialty Primary office phone Iggy Kovacs MD Attending Provider Cardiology [phone] Your Primary Care Physician (PCP) Primary Care Physician Office Phone Office Fax Angel Contreras [phone] Follow-up Information Follow up With Details Comments Contact Info Hui Cole MD   [address] [place] [address] 
[phone] Current Discharge Medication List  
  
START taking these medications Dose & Instructions Dispensing Information Comments Morning Noon Evening Bedtime  
 ranolazine  mg SR tablet Commonly known as:  RANEXA Your last dose was: Your next dose is:    
   
   
 Dose:  500 mg Take 1 Tab by mouth two (2) times a day. Indications: CHRONIC STABLE ANGINA PECTORIS Quantity:  60 Tab Refills:  5 CONTINUE these medications which have NOT CHANGED Dose & Instructions Dispensing Information Comments Morning Noon Evening Bedtime  
 acetaminophen-codeine 300-30 mg per tablet Commonly known as:  TYLENOL #3 Your last dose was: Your next dose is:    
   
   
 Dose:  1 Tab Take 1 Tab by mouth every six (6) hours as needed for Pain. Refills:  0  
     
   
   
   
  
 albuterol 90 mcg/actuation inhaler Commonly known as:  PROVENTIL HFA, VENTOLIN HFA, PROAIR HFA Your last dose was: Your next dose is:    
   
   
 Dose:  2 Puff Take 2 Puffs by inhalation every four (4) hours as needed. Refills:  0  
     
   
   
   
  
 aspirin delayed-release 81 mg tablet Your last dose was: Your next dose is:    
   
   
 Dose:  81 mg Take 81 mg by mouth daily. Refills:  0  
     
   
   
   
  
 atorvastatin 20 mg tablet Commonly known as:  LIPITOR Your last dose was: Your next dose is:    
   
   
 Dose:  80 mg Take 80 mg by mouth nightly. Refills:  0  
     
   
   
   
  
 bumetanide 1 mg tablet Commonly known as:  Jamar Daubs Your last dose was: Your next dose is:    
   
   
 Dose:  1 mg Take 1 mg by mouth daily. Refills:  0  
     
   
   
   
  
 carvedilol 25 mg tablet Commonly known as:  Fadumo Shade Your last dose was: Your next dose is:    
   
   
 Dose:  25 mg Take 25 mg by mouth two (2) times daily (with meals). Refills:  0  
     
   
   
   
  
 diclofenac 3 % topical gel Commonly known as:  Larina Pass Your last dose was: Your next dose is:    
   
   
 Dose:  1 Each Apply 1 Each to affected area daily. Refills:  0  
     
   
   
   
  
 ferrous sulfate 325 mg (65 mg iron) tablet Your last dose was: Your next dose is:    
   
   
 Dose:  325 mg Take 325 mg by mouth Daily (before breakfast). Refills:  0  
     
   
   
   
  
 hydrALAZINE 25 mg tablet Commonly known as:  APRESOLINE Your last dose was: Your next dose is:    
   
   
 Dose:  12.5 mg Take 12.5 mg by mouth two (2) times a day. Refills:  0 nitroglycerin 0.4 mg SL tablet Commonly known as:  NITROSTAT Your last dose was: Your next dose is:    
   
   
 Dose:  0.4 mg  
1 Tab by SubLINGual route every five (5) minutes as needed for Chest Pain for up to 3 doses. Quantity:  1 Bottle Refills:  3 STOP taking these medications   
 isosorbide mononitrate ER 30 mg tablet Commonly known as:  IMDUR  
   
  
  
ASK your doctor about these medications Dose & Instructions Dispensing Information Comments Morning Noon Evening Bedtime QVAR 80 mcg/actuation Met[employer] Protect Generic drug:  beclomethasone Your last dose was: Your next dose is:    
   
   
 Dose:  2 Puff Take 2 Puffs by inhalation two (2) times a day. Refills:  0 Where to Get Your Medications Information on where to get these meds will be given to you by the nurse or doctor. ! Ask your nurse or doctor about these medications  
  ranolazine  mg SR tablet Discharge Instructions Cardiac Catheterization/Angiography Discharge Instructions *Check the puncture site frequently for swelling or bleeding. If you see any bleeding, lie down and apply pressure over the area with a clean town or washcloth. Notify your doctor for any redness, swelling, drainage or oozing from the puncture site. Notify your doctor for any fever or chills. *If the leg or arm with the puncture becomes cold, numb or painful, call Dr Jose Ramsey at  [facility] *Activity should be limited for the next 48 hours. Climb stairs as little as possible and avoid any stooping, bending or strenuous activity for 48 hours. No heavy lifting (anything over 10 pounds) for three days. *Do not drive for 48 hours. *You may resume your usual diet. Drink more fluids than usual. 
 
*Have a responsible person drive you home and stay with you for at least 24 hours after your heart catheterization/angiography. *You may remove the bandage from your {ARM/GROIN:28272} in 24 hours. You may shower in 24 hours. No tub baths, hot tubs or swimming for one week. Do not place any lotions, creams, powders, ointments over the puncture site for one week. You may place a clean band-aid over the puncture site each day for 5 days. Change this daily. Discharge Orders None Introducing [facility]! Chica Simon introduces [employer] patient portal. Now you can access parts of your medical record, email your doctor's office, and request medication refills online. 1. In your internet browser, go to [email or web address] 2. Click on the First Time User? Click Here link in the Sign In box. You will see the New Member Sign Up page. 3. Enter your [employer] Access Code exactly as it appears below. You will not need to use this code after youve completed the sign-up process. If you do not sign up before the expiration date, you must request a new code. · [employer] Access Code: [email or web address] Expires: 9/11/2017 10:31 AM 
 
4. Enter the last four digits of your Social Security Number (xxxx) and Date of Birth (mm/dd/yyyy) as indicated and click Submit. You will be taken to the next sign-up page. 5. Create a [employer] ID. This will be your [employer] login ID and cannot be changed, so think of one that is secure and easy to remember. 6. Create a [employer] password. You can change your password at any time. 7. Enter your Password Reset Question and Answer. This can be used at a later time if you forget your password. 8. Enter your e-mail address. You will receive e-mail notification when new information is available in [address] 9. Click Sign Up. You can now view and download portions of your medical record. 10. Click the Download Summary menu link to download a portable copy of your medical information.  
 
If you have questions, please visit the Frequently Asked Questions section of the [employer]. Remember, MyChart is NOT to be used for urgent needs. For medical emergencies, dial 911. Now available from your iPhone and Android! General Information Please provide this summary of care documentation to your next provider. Patient Signature:  ____________________________________________________________ Date:  ____________________________________________________________  
  
Larri Hazard Provider Signature:  ____________________________________________________________ Date:  ____________________________________________________________

## 2017-07-07 NOTE — PROGRESS NOTES
TRANSFER - IN REPORT:    Verbal report received from Will RT on Evon Long.  being received from cath lab procedure area  for routine progression of care. Report consisted of patients Situation, Background, Assessment and Recommendations(SBAR). Information from the following report(s) Kardex and Procedure Summary was reviewed with the receiving clinician. Opportunity for questions and clarification was provided. Assessment completed upon patients arrival to [address] [place] [address] and care assumed. Cardiac Cath Lab Recovery Arrival Note:    Mary Evans Sr. arrived to [facility] ([place], [place]). Patient procedure= LHC. Patient on cardiac monitor, non-invasive blood pressure, SPO2 monitor. On  O2 @ 2 lpm via NC.  IV  of NS on pump at 140 ml/hr. Patient status doing well without problems. Patient is A&Ox 3. Patient reports no pain. PROCEDURE SITE CHECK:    Procedure site:without any bleeding and no hematoma, No pain/discomfort reported at procedure site. No change in patient status. Continue to monitor patient and status.

## 2017-07-07 NOTE — DISCHARGE INSTRUCTIONS
Cardiac Catheterization/Angiography Discharge Instructions    *Check the puncture site frequently for swelling or bleeding. If you see any bleeding, lie down and apply pressure over the area with a clean town or washcloth. Notify your doctor for any redness, swelling, drainage or oozing from the puncture site. Notify your doctor for any fever or chills. *If the leg or arm with the puncture becomes cold, numb or painful, call Dr Marta Maldonado at  [facility]    *Activity should be limited for the next 48 hours. Climb stairs as little as possible and avoid any stooping, bending or strenuous activity for 48 hours. No heavy lifting (anything over 10 pounds) for three days. *Do not drive for 48 hours. *You may resume your usual diet. Drink more fluids than usual.    *Have a responsible person drive you home and stay with you for at least 24 hours after your heart catheterization/angiography. *You may remove the bandage from your {ARM/GROIN:22099} in 24 hours. You may shower in 24 hours. No tub baths, hot tubs or swimming for one week. Do not place any lotions, creams, powders, ointments over the puncture site for one week. You may place a clean band-aid over the puncture site each day for 5 days. Change this daily.

## 2017-07-10 ENCOUNTER — HOSPITAL ENCOUNTER (EMERGENCY)
Age: 82
Discharge: HOME | End: 2017-07-10
Attending: EMERGENCY MEDICINE
Payer: MEDICARE

## 2017-07-10 ENCOUNTER — APPOINTMENT (OUTPATIENT)
Dept: GENERAL RADIOLOGY | Age: 82
End: 2017-07-10
Attending: EMERGENCY MEDICINE
Payer: MEDICARE

## 2017-07-10 VITALS
BODY MASS INDEX: 30.31 KG/M2 | OXYGEN SATURATION: 96 % | SYSTOLIC BLOOD PRESSURE: 160 MMHG | HEART RATE: 70 BPM | WEIGHT: 200 LBS | RESPIRATION RATE: 20 BRPM | DIASTOLIC BLOOD PRESSURE: 97 MMHG | TEMPERATURE: 98.2 F | HEIGHT: 68 IN

## 2017-07-10 DIAGNOSIS — R53.1 WEAKNESS: Primary | ICD-10-CM

## 2017-07-10 LAB
ALBUMIN SERPL BCP-MCNC: 2.7 G/DL (ref 3.5–5)
ALBUMIN/GLOB SERPL: 0.6 {RATIO} (ref 1.1–2.2)
ALP SERPL-CCNC: 102 U/L (ref 45–117)
ALT SERPL-CCNC: 12 U/L (ref 12–78)
ANION GAP BLD CALC-SCNC: 9 MMOL/L (ref 5–15)
APPEARANCE UR: CLEAR
AST SERPL W P-5'-P-CCNC: 13 U/L (ref 15–37)
BASOPHILS # BLD AUTO: 0 K/UL (ref 0–0.1)
BASOPHILS # BLD: 0 % (ref 0–1)
BILIRUB SERPL-MCNC: 0.5 MG/DL (ref 0.2–1)
BILIRUB UR QL: NEGATIVE
BNP SERPL-MCNC: 369 PG/ML (ref 0–100)
BUN SERPL-MCNC: 19 MG/DL (ref 6–20)
BUN/CREAT SERPL: 8 (ref 12–20)
CALCIUM SERPL-MCNC: 9.2 MG/DL (ref 8.5–10.1)
CHLORIDE SERPL-SCNC: 107 MMOL/L (ref 97–108)
CK MB CFR SERPL CALC: NORMAL % (ref 0–2.5)
CK MB SERPL-MCNC: <1 NG/ML (ref 5–25)
CK SERPL-CCNC: 69 U/L (ref 39–308)
CO2 SERPL-SCNC: 24 MMOL/L (ref 21–32)
COLOR UR: NORMAL
CREAT SERPL-MCNC: 2.25 MG/DL (ref 0.7–1.3)
EOSINOPHIL # BLD: 0.3 K/UL (ref 0–0.4)
EOSINOPHIL NFR BLD: 3 % (ref 0–7)
ERYTHROCYTE [DISTWIDTH] IN BLOOD BY AUTOMATED COUNT: 19 % (ref 11.5–14.5)
GLOBULIN SER CALC-MCNC: 4.5 G/DL (ref 2–4)
GLUCOSE SERPL-MCNC: 102 MG/DL (ref 65–100)
GLUCOSE UR STRIP.AUTO-MCNC: NEGATIVE MG/DL
HCT VFR BLD AUTO: 33.9 % (ref 36.6–50.3)
HGB BLD-MCNC: 10.3 G/DL (ref 12.1–17)
HGB UR QL STRIP: NEGATIVE
KETONES UR QL STRIP.AUTO: NEGATIVE MG/DL
LEUKOCYTE ESTERASE UR QL STRIP.AUTO: NEGATIVE
LYMPHOCYTES # BLD AUTO: 21 % (ref 12–49)
LYMPHOCYTES # BLD: 2 K/UL (ref 0.8–3.5)
MCH RBC QN AUTO: 26.3 PG (ref 26–34)
MCHC RBC AUTO-ENTMCNC: 30.4 G/DL (ref 30–36.5)
MCV RBC AUTO: 86.5 FL (ref 80–99)
MONOCYTES # BLD: 0.8 K/UL (ref 0–1)
MONOCYTES NFR BLD AUTO: 8 % (ref 5–13)
NEUTS SEG # BLD: 6.3 K/UL (ref 1.8–8)
NEUTS SEG NFR BLD AUTO: 68 % (ref 32–75)
NITRITE UR QL STRIP.AUTO: NEGATIVE
PH UR STRIP: 7 [PH] (ref 5–8)
PLATELET # BLD AUTO: 257 K/UL (ref 150–400)
POTASSIUM SERPL-SCNC: 4.2 MMOL/L (ref 3.5–5.1)
PROT SERPL-MCNC: 7.2 G/DL (ref 6.4–8.2)
PROT UR STRIP-MCNC: NEGATIVE MG/DL
RBC # BLD AUTO: 3.92 M/UL (ref 4.1–5.7)
SODIUM SERPL-SCNC: 140 MMOL/L (ref 136–145)
SP GR UR REFRACTOMETRY: 1.02 (ref 1–1.03)
TROPONIN I SERPL-MCNC: <0.04 NG/ML
UROBILINOGEN UR QL STRIP.AUTO: 1 EU/DL (ref 0.2–1)
WBC # BLD AUTO: 9.4 K/UL (ref 4.1–11.1)

## 2017-07-10 PROCEDURE — 36415 COLL VENOUS BLD VENIPUNCTURE: CPT | Performed by: EMERGENCY MEDICINE

## 2017-07-10 PROCEDURE — 71020 XR CHEST PA LAT: CPT

## 2017-07-10 PROCEDURE — 81003 URINALYSIS AUTO W/O SCOPE: CPT | Performed by: EMERGENCY MEDICINE

## 2017-07-10 PROCEDURE — 83880 ASSAY OF NATRIURETIC PEPTIDE: CPT | Performed by: EMERGENCY MEDICINE

## 2017-07-10 PROCEDURE — 84484 ASSAY OF TROPONIN QUANT: CPT | Performed by: EMERGENCY MEDICINE

## 2017-07-10 PROCEDURE — 80053 COMPREHEN METABOLIC PANEL: CPT | Performed by: EMERGENCY MEDICINE

## 2017-07-10 PROCEDURE — 85025 COMPLETE CBC W/AUTO DIFF WBC: CPT | Performed by: EMERGENCY MEDICINE

## 2017-07-10 PROCEDURE — 82550 ASSAY OF CK (CPK): CPT | Performed by: EMERGENCY MEDICINE

## 2017-07-10 PROCEDURE — 99284 EMERGENCY DEPT VISIT MOD MDM: CPT

## 2017-07-10 RX ORDER — ATORVASTATIN CALCIUM 80 MG/1
80 TABLET, FILM COATED ORAL
COMMUNITY

## 2017-07-10 NOTE — ED PROVIDER NOTES
HPI Comments: 80 y.o. male with past medical history significant for CAD, heart failure and HTN who presents from home with chief complaint of fatigue. Per pt, he was seen by Dr. Aminah Beth on Friday (7/7/2017) for a heart catheterization, however was unable to have it placed as his vessel was too small at the time. The pt was put on Ranexa and instructed to take it BID. Since starting the medication, the pt makes it known that he has felt fatigued and generally weak. He notes each time he takes the medication he experiences this fatigue and weakness. Per pt, this morning (7/10/2017) around 0100 he experienced increased fatigue and weakness which prevented him from ambulating. He adds that he experienced associated SOB as well as dizziness, headache and nausea at the time which caused him moderate discomfort. While in the ED, the pt notes he has also experienced abd discomfort yet has not experienced any vomiting since onset. The pt states he believes his medication is the cause of his symptoms however he is unsure. He denies fever, chills, V/D, CP, back pain, lightheadedness and urinary symptoms. There are no other acute medical concerns at this time. Social hx: Non-smoker, No ETOH use  PCP: Pacheco Ji MD    Note written by Timi Leblanc, as dictated by Nikolas Sanz MD 6:14 AM          The history is provided by the patient. Past Medical History:   Diagnosis Date    CAD (coronary artery disease)     Heart failure ([address]     Hypertension        Past Surgical History:   Procedure Laterality Date    HX ORTHOPAEDIC      bilateral hip replacement    HX PACEMAKER           History reviewed. No pertinent family history. Social History     Social History    Marital status:      Spouse name: N/A    Number of children: N/A    Years of education: N/A     Occupational History    Not on file.      Social History Main Topics    Smoking status: Never Smoker    Smokeless tobacco: Never Used    Alcohol use No    Drug use: Not on file    Sexual activity: Not on file     Other Topics Concern    Not on file     Social History Narrative         ALLERGIES: Penicillins    Review of Systems   Constitutional: Positive for fatigue (increased this morning around 0100 with associated weakness). Negative for activity change, appetite change, diaphoresis and fever. HENT: Negative for ear pain, facial swelling, sore throat and trouble swallowing. Eyes: Negative. Negative for pain, discharge and visual disturbance. Respiratory: Positive for shortness of breath ( associated with fatigue/weakness). Negative for chest tightness and wheezing. Cardiovascular: Negative for chest pain and palpitations. Gastrointestinal: Positive for abdominal pain ( ) and nausea. Negative for blood in stool, diarrhea and vomiting. Endocrine: Negative. Genitourinary: Negative. Negative for difficulty urinating, dysuria, flank pain, hematuria and urgency. Musculoskeletal: Negative. Negative for arthralgias, joint swelling, myalgias and neck pain. Skin: Negative. Negative for color change and rash. Allergic/Immunologic: Negative. Neurological: Positive for dizziness ( associated with fatigue/weakness), weakness ( moderate generalized weakness since starting Renaxa (7/72017) worsened today (7/10/2017) around 0100) and headaches ( onset this morning at 0100). Negative for numbness. Hematological: Negative for adenopathy. Does not bruise/bleed easily. Psychiatric/Behavioral: Negative for behavioral problems, confusion and sleep disturbance. All other systems reviewed and are negative. Vitals:    07/10/17 0553   BP: 131/88   Pulse: 75   Resp: 16   Temp: 98 °F (36.7 °C)   SpO2: 94%   Weight: 90.7 kg (200 lb)   Height: 5' 8\" (1.727 m)            Physical Exam   Constitutional: He is oriented to person, place, and time. He appears well-developed and well-nourished. No distress.    HENT:   Head: Normocephalic and atraumatic. Nose: Nose normal.   Mouth/Throat: Oropharynx is clear and moist.   Eyes: Conjunctivae are normal. No scleral icterus. Blind left eye   Neck: Normal range of motion. Neck supple. No JVD present. No tracheal deviation present. No thyromegaly present. No carotid bruits noted. Cardiovascular: Normal rate, regular rhythm, normal heart sounds and intact distal pulses. Exam reveals no gallop and no friction rub. No murmur heard. Pulmonary/Chest: Effort normal. No respiratory distress. He has no wheezes. He has rales (occasional ). He exhibits no tenderness. Abdominal: Soft. Bowel sounds are normal. He exhibits no distension and no mass. There is no tenderness. There is no rebound and no guarding. Musculoskeletal: Normal range of motion. He exhibits no edema or tenderness. No peripheral edema noted   Lymphadenopathy:     He has no cervical adenopathy. Neurological: He is alert and oriented to person, place, and time. He has normal reflexes. No cranial nerve deficit. Coordination normal.   Skin: Skin is warm and dry. No rash noted. No erythema. Psychiatric: He has a normal mood and affect. His behavior is normal. Judgment and thought content normal.   Nursing note and vitals reviewed.      Note written by Timi Veloz, as dictated by Cameron Bruner MD 6:14 AM    MDM  Number of Diagnoses or Management Options  Weakness: new and requires workup     Amount and/or Complexity of Data Reviewed  Clinical lab tests: ordered and reviewed  Tests in the radiology section of CPT®: ordered and reviewed  Decide to obtain previous medical records or to obtain history from someone other than the patient: yes  Review and summarize past medical records: yes  Discuss the patient with other providers: yes  Independent visualization of images, tracings, or specimens: yes    Risk of Complications, Morbidity, and/or Mortality  Presenting problems: high  Diagnostic procedures: high  Management options: high    Patient Progress  Patient progress: stable    ED Course       Procedures    CONSULT NOTE:  8:14 AM Farooq Cantu MD spoke with Dr. Faye Petit, Consult for Cardiology. Discussed available diagnostic tests and clinical findings. He is in agreement with care plans as outlined. Dr. Faye Petit recommends that the pt stop the Ranexa and to see him in office. PROGRESS NOTE:  8:35 AM    Pt appears mildly dry, cardiac markers appear without concern at this time. Discharged with instructions from Dr. Glory Longo.

## 2017-07-10 NOTE — ED TRIAGE NOTES
I was here last Fri. For a heart catherization but they couldn't do it because the vessel was so small. . I was told to take Ranexa which I started Fri. Evening. Ever since I have felt nausea, had a H/A and feel weak. I spoke with the MD that put me on the medication and was told to keep taking it. Last night I felt the worse ever.

## 2017-07-10 NOTE — ED NOTES
Discharge instructions given to pt. All questions answered and pt verbalized understanding. V/S stable @ time of discharge. No lines or drains in place.  Pt taken out of unit via wheelchair

## 2017-07-10 NOTE — PROGRESS NOTES
Admission Medication Reconciliation:    Information obtained from: Patient, patient's daughter, RX query, patient's medication list    Significant PMH/Disease States:   Past Medical History:   Diagnosis Date    CAD (coronary artery disease)     Heart failure ([facility] [address] [facility]     Hypertension        Chief Complaint for this Admission:  Chest pain    Allergies:  Penicillins    Prior to Admission Medications:   Prior to Admission Medications   Prescriptions Last Dose Informant Patient Reported? Taking? albuterol (PROVENTIL HFA, VENTOLIN HFA, PROAIR HFA) 90 mcg/actuation inhaler 2017 at pm  Yes Yes   Sig: Take 2 Puffs by inhalation every four (4) hours as needed. aspirin delayed-release 81 mg tablet 2017 at am  Yes Yes   Sig: Take 81 mg by mouth daily. atorvastatin (LIPITOR) 80 mg tablet 2017 at Unknown time  Yes Yes   Sig: Take 80 mg by mouth nightly. beclomethasone (QVAR) 80 mcg/actuation aero 2017 at pm  Yes Yes   Sig: Take 2 Puffs by inhalation two (2) times a day. bumetanide (BUMEX) 1 mg tablet 2017 at am  Yes Yes   Sig: Take 1 mg by mouth daily. carvedilol (COREG) 25 mg tablet 2017 at 1700  Yes Yes   Sig: Take 12.5 mg by mouth two (2) times daily (with meals). ferrous sulfate 325 mg (65 mg iron) tablet 2017 at am  Yes Yes   Sig: Take 325 mg by mouth Daily (before breakfast). hydrALAZINE (APRESOLINE) 25 mg tablet 2017 at pm  Yes Yes   Sig: Take 25 mg by mouth daily. nitroglycerin (NITROSTAT) 0.4 mg SL tablet   No Yes   Si Tab by SubLINGual route every five (5) minutes as needed for Chest Pain for up to 3 doses. ranolazine ER (RANEXA) 500 mg SR tablet 2017 at 1730  No Yes   Sig: Take 1 Tab by mouth two (2) times a day. Indications: CHRONIC STABLE ANGINA PECTORIS      Facility-Administered Medications: None         Comments/Recommendations: Patient and daughter were both good historians. Changed hydralazine 12.5mg BID to 25 mg daily.

## 2017-07-11 NOTE — CALL BACK NOTE
[facility] Emergency Department Follow Up Call Record    Discharged to : Home/Family Home/Home Health/Skilled Facility/Rehab/Assisted Living/Other__home_____  1) Did you receive your discharge instructions? Yes        2) Do you understand them? Yes         3) Are you able to follow them? Yes          If NO, what can I clarify for you? 4) Do you understand your diagnosis? Yes         5) Do you know which symptoms should prompt you to call the doctor? Yes     6) Were you able to fill and  any medications that were prescribed? Not applicable     7) You were prescribed  N/A___________for ____________________. Common side effects of this medication are____________________. This is not a complete list so please review the forms given from the pharmacy for a complete list.      8) Are there any questions about your medications? No  Mary Kay Phillips reports he has stopped the Ranexa. He reports feeling better today. He does continue with GI upset at times after eating. He is taking Tums. Have you scheduled any recommended doctors appointments (specialty, PCP) YES. Appointment in two weeks with Dr. Liliya Corbett. If NO, what barriers are you encountering (transportation/lost contact info/cost/  didnt think necessary/no PCP  9) If discharged with Home Health, has the agency contacted you to schedule visit? Not applicable  10) Is there anyone available to help you at home (meals, errands, transportation    monitoring) (adult children, neighbors, private duty companions) Yes    11) Are you on a special diet? No         If YES, do you understand the requirements for this diet? Education provided? 12) If presented with cough, bronchitis, COPD, asthma, is it ok to ask that the   respiratory disease management educator call you? Not applicable      13)  A) If presented with fall, were you issued an assistive device in the ED    Are you using? No  B) If given RX for device, have you obtained? Not applicable       If NO, barriers? C) Therapist recommended: No   Are you able to implement the suggestions? Not applicable        If NO, barriers to implementation? D) Are you having any difficulties with mobility inside your home?     (steps, bed, tub)No   If YES, ask if the [facility] PT can contact patient and good time and number?  14)  At the end of your discharge instructions, there is information about accessing [facility], have you had a chance to review those? No         Do you have any questions about signing up for this service? We encourage our patients to be active participants in their healthcare and this site is one of the ways to do that. It will allow you to access parts of your medical record, email your doctors office, schedule appointments, and request medications refills . 15) Are there any other questions that I can answer for you regarding    your Emergency department visit?  NO             Estimated Call Time:_____11:07 AM  ______________ Date/Time:_______________

## 2017-08-06 ENCOUNTER — APPOINTMENT (OUTPATIENT)
Dept: GENERAL RADIOLOGY | Age: 82
End: 2017-08-06
Attending: EMERGENCY MEDICINE
Payer: MEDICARE

## 2017-08-06 ENCOUNTER — HOSPITAL ENCOUNTER (EMERGENCY)
Age: 82
Discharge: HOME | End: 2017-08-06
Attending: EMERGENCY MEDICINE | Admitting: EMERGENCY MEDICINE
Payer: MEDICARE

## 2017-08-06 VITALS
DIASTOLIC BLOOD PRESSURE: 100 MMHG | HEART RATE: 110 BPM | WEIGHT: 195 LBS | HEIGHT: 68 IN | OXYGEN SATURATION: 98 % | BODY MASS INDEX: 29.55 KG/M2 | SYSTOLIC BLOOD PRESSURE: 122 MMHG | TEMPERATURE: 97.5 F | RESPIRATION RATE: 20 BRPM

## 2017-08-06 DIAGNOSIS — I48.91 ATRIAL FIBRILLATION, UNSPECIFIED TYPE (HCC): Primary | ICD-10-CM

## 2017-08-06 LAB
ALBUMIN SERPL BCP-MCNC: 3.1 G/DL (ref 3.5–5)
ALBUMIN/GLOB SERPL: 0.7 {RATIO} (ref 1.1–2.2)
ALP SERPL-CCNC: 105 U/L (ref 45–117)
ALT SERPL-CCNC: 9 U/L (ref 12–78)
ANION GAP BLD CALC-SCNC: 5 MMOL/L (ref 5–15)
AST SERPL W P-5'-P-CCNC: 7 U/L (ref 15–37)
ATRIAL RATE: 270 BPM
ATRIAL RATE: 300 BPM
BASOPHILS # BLD AUTO: 0 K/UL (ref 0–0.1)
BASOPHILS # BLD: 0 % (ref 0–1)
BILIRUB SERPL-MCNC: 0.4 MG/DL (ref 0.2–1)
BUN SERPL-MCNC: 24 MG/DL (ref 6–20)
BUN/CREAT SERPL: 12 (ref 12–20)
CALCIUM SERPL-MCNC: 9.2 MG/DL (ref 8.5–10.1)
CALCULATED R AXIS, ECG10: 4 DEGREES
CALCULATED R AXIS, ECG10: 9 DEGREES
CALCULATED T AXIS, ECG11: -20 DEGREES
CALCULATED T AXIS, ECG11: -5 DEGREES
CHLORIDE SERPL-SCNC: 106 MMOL/L (ref 97–108)
CK SERPL-CCNC: 52 U/L (ref 39–308)
CO2 SERPL-SCNC: 29 MMOL/L (ref 21–32)
CREAT SERPL-MCNC: 1.99 MG/DL (ref 0.7–1.3)
DIAGNOSIS, 93000: NORMAL
DIAGNOSIS, 93000: NORMAL
EOSINOPHIL # BLD: 0.4 K/UL (ref 0–0.4)
EOSINOPHIL NFR BLD: 4 % (ref 0–7)
ERYTHROCYTE [DISTWIDTH] IN BLOOD BY AUTOMATED COUNT: 18.8 % (ref 11.5–14.5)
GLOBULIN SER CALC-MCNC: 4.3 G/DL (ref 2–4)
GLUCOSE SERPL-MCNC: 128 MG/DL (ref 65–100)
HCT VFR BLD AUTO: 35.4 % (ref 36.6–50.3)
HGB BLD-MCNC: 10.9 G/DL (ref 12.1–17)
INR PPP: 1.1 (ref 0.9–1.1)
LYMPHOCYTES # BLD AUTO: 24 % (ref 12–49)
LYMPHOCYTES # BLD: 2.2 K/UL (ref 0.8–3.5)
MCH RBC QN AUTO: 26.9 PG (ref 26–34)
MCHC RBC AUTO-ENTMCNC: 30.8 G/DL (ref 30–36.5)
MCV RBC AUTO: 87.4 FL (ref 80–99)
MONOCYTES # BLD: 0.8 K/UL (ref 0–1)
MONOCYTES NFR BLD AUTO: 8 % (ref 5–13)
NEUTS SEG # BLD: 5.7 K/UL (ref 1.8–8)
NEUTS SEG NFR BLD AUTO: 64 % (ref 32–75)
PLATELET # BLD AUTO: 232 K/UL (ref 150–400)
POTASSIUM SERPL-SCNC: 4 MMOL/L (ref 3.5–5.1)
PROT SERPL-MCNC: 7.4 G/DL (ref 6.4–8.2)
PROTHROMBIN TIME: 11.7 SEC (ref 9–11.1)
Q-T INTERVAL, ECG07: 324 MS
Q-T INTERVAL, ECG07: 382 MS
QRS DURATION, ECG06: 100 MS
QRS DURATION, ECG06: 90 MS
QTC CALCULATION (BEZET), ECG08: 485 MS
QTC CALCULATION (BEZET), ECG08: 486 MS
RBC # BLD AUTO: 4.05 M/UL (ref 4.1–5.7)
SODIUM SERPL-SCNC: 140 MMOL/L (ref 136–145)
TROPONIN I SERPL-MCNC: <0.04 NG/ML
VENTRICULAR RATE, ECG03: 135 BPM
VENTRICULAR RATE, ECG03: 97 BPM
WBC # BLD AUTO: 9.1 K/UL (ref 4.1–11.1)

## 2017-08-06 PROCEDURE — 36415 COLL VENOUS BLD VENIPUNCTURE: CPT | Performed by: EMERGENCY MEDICINE

## 2017-08-06 PROCEDURE — 93005 ELECTROCARDIOGRAM TRACING: CPT

## 2017-08-06 PROCEDURE — 80053 COMPREHEN METABOLIC PANEL: CPT | Performed by: EMERGENCY MEDICINE

## 2017-08-06 PROCEDURE — 85025 COMPLETE CBC W/AUTO DIFF WBC: CPT | Performed by: EMERGENCY MEDICINE

## 2017-08-06 PROCEDURE — 99285 EMERGENCY DEPT VISIT HI MDM: CPT

## 2017-08-06 PROCEDURE — 71010 XR CHEST PORT: CPT

## 2017-08-06 PROCEDURE — 84484 ASSAY OF TROPONIN QUANT: CPT | Performed by: EMERGENCY MEDICINE

## 2017-08-06 PROCEDURE — 85610 PROTHROMBIN TIME: CPT | Performed by: EMERGENCY MEDICINE

## 2017-08-06 PROCEDURE — 82550 ASSAY OF CK (CPK): CPT | Performed by: EMERGENCY MEDICINE

## 2017-08-06 RX ORDER — DILTIAZEM HYDROCHLORIDE 5 MG/ML
20 INJECTION INTRAVENOUS
Status: DISCONTINUED | OUTPATIENT
Start: 2017-08-06 | End: 2017-08-06

## 2017-08-06 RX ORDER — AMIODARONE HYDROCHLORIDE 200 MG/1
200 TABLET ORAL 2 TIMES DAILY
Qty: 60 TAB | Refills: 0 | Status: ON HOLD | OUTPATIENT
Start: 2017-08-06 | End: 2017-08-11

## 2017-08-06 NOTE — ED TRIAGE NOTES
CP onset yesterday afternoon with rapid heart rate. Denies dizziness, SOB, N/V.  bpm in triage. Pt denies CP, today.

## 2017-08-06 NOTE — ED NOTES
Spoke with Giana Carrillo, from [facility]. Data transmitted successfully and will be faxed to [facility] [place] [facility] in 10 minutes. Pt continues on monitor x 3 with irregular HR 90. Denies CP or SOB.

## 2017-08-06 NOTE — ED NOTES
Bedside shift change report given to Ria Rousseau RN (oncoming nurse) by Newton Mak RN (offgoing nurse). Report included the following information SBAR.

## 2017-08-06 NOTE — DISCHARGE INSTRUCTIONS
Atrial Fibrillation: Care Instructions  Your Care Instructions    Atrial fibrillation is an irregular and often fast heartbeat. Treating this condition is important for several reasons. It can cause blood clots, which can travel from your heart to your brain and cause a stroke. If you have a fast heartbeat, you may feel lightheaded, dizzy, and weak. An irregular heartbeat can also increase your risk for heart failure. Atrial fibrillation is often the result of another heart condition, such as high blood pressure or coronary artery disease. Making changes to improve your heart condition will help you stay healthy and active. Follow-up care is a key part of your treatment and safety. Be sure to make and go to all appointments, and call your doctor if you are having problems. It's also a good idea to know your test results and keep a list of the medicines you take. How can you care for yourself at home? Medicines  · Take your medicines exactly as prescribed. Call your doctor if you think you are having a problem with your medicine. You will get more details on the specific medicines your doctor prescribes. · If your doctor has given you a blood thinner to prevent a stroke, be sure you get instructions about how to take your medicine safely. Blood thinners can cause serious bleeding problems. · Do not take any vitamins, over-the-counter drugs, or herbal products without talking to your doctor first.  Lifestyle changes  · Do not smoke. Smoking can increase your chance of a stroke and heart attack. If you need help quitting, talk to your doctor about stop-smoking programs and medicines. These can increase your chances of quitting for good. · Eat a heart-healthy diet. · Stay at a healthy weight. Lose weight if you need to. · Limit alcohol to 2 drinks a day for men and 1 drink a day for women. Too much alcohol can cause health problems. · Avoid colds and flu. Get a pneumococcal vaccine shot.  If you have had one before, ask your doctor whether you need another dose. Get a flu shot every year. If you must be around people with colds or flu, wash your hands often. Activity  · If your doctor recommends it, get more exercise. Walking is a good choice. Bit by bit, increase the amount you walk every day. Try for at least 30 minutes on most days of the week. You also may want to swim, bike, or do other activities. Your doctor may suggest that you join a cardiac rehabilitation program so that you can have help increasing your physical activity safely. · Start light exercise if your doctor says it is okay. Even a small amount will help you get stronger, have more energy, and manage stress. Walking is an easy way to get exercise. Start out by walking a little more than you did in the hospital. Gradually increase the amount you walk. · When you exercise, watch for signs that your heart is working too hard. You are pushing too hard if you cannot talk while you are exercising. If you become short of breath or dizzy or have chest pain, sit down and rest immediately. · Check your pulse regularly. Place two fingers on the artery at the palm side of your wrist, in line with your thumb. If your heartbeat seems uneven or fast, talk to your doctor. When should you call for help? Call 911 anytime you think you may need emergency care. For example, call if:  · You have symptoms of a heart attack. These may include:  ¨ Chest pain or pressure, or a strange feeling in the chest.  ¨ Sweating. ¨ Shortness of breath. ¨ Nausea or vomiting. ¨ Pain, pressure, or a strange feeling in the back, neck, jaw, or upper belly or in one or both shoulders or arms. ¨ Lightheadedness or sudden weakness. ¨ A fast or irregular heartbeat. After you call 911, the  may tell you to chew 1 adult-strength or 2 to 4 low-dose aspirin. Wait for an ambulance. Do not try to drive yourself. · You have symptoms of a stroke.  These may include:  ¨ Sudden numbness, tingling, weakness, or loss of movement in your face, arm, or leg, especially on only one side of your body. ¨ Sudden vision changes. ¨ Sudden trouble speaking. ¨ Sudden confusion or trouble understanding simple statements. ¨ Sudden problems with walking or balance. ¨ A sudden, severe headache that is different from past headaches. · You passed out (lost consciousness). Call your doctor now or seek immediate medical care if:  · You have new or increased shortness of breath. · You feel dizzy or lightheaded, or you feel like you may faint. · Your heart rate becomes irregular. · You can feel your heart flutter in your chest or skip heartbeats. Tell your doctor if these symptoms are new or worse. Watch closely for changes in your health, and be sure to contact your doctor if you have any problems. Where can you learn more? Go to http://kavitha-jolie.info/. Enter U020 in the search box to learn more about \"Atrial Fibrillation: Care Instructions. \"  Current as of: September 21, 2016  Content Version: 11.3  © 2270-4622 [employer]. Care instructions adapted under license by [employer] (which disclaims liability or warranty for this information). If you have questions about a medical condition or this instruction, always ask your healthcare professional. [address] any warranty or liability for your use of this information. We hope that we have addressed all of your medical concerns. The examination and treatment you received in the Emergency Department were for an emergent problem and were not intended as complete care. It is important that you follow up with your healthcare provider(s) for ongoing care. If your symptoms worsen or do not improve as expected, and you are unable to reach your usual health care provider(s), you should return to the Emergency Department.       Today's healthcare is undergoing tremendous change, and patient satisfaction surveys are one of the many tools to assess the quality of medical care. You may receive a survey from the [employer] regarding your experience in the Emergency Department. I hope that your experience has been completely positive, particularly the medical care that I provided. As such, please participate in the survey; anything less than excellent does not meet my expectations or intentions. [facility] and [facility] participate in nationally recognized quality of care measures. If your blood pressure is greater than 120/80, as reported below, we urge that you seek medical care to address the potential of high blood pressure, commonly known as hypertension. Hypertension can be hereditary or can be caused by certain medical conditions, pain, stress, or \"white coat syndrome. \"       Please make an appointment with your health care provider(s) for follow up of your Emergency Department visit. VITALS:   Patient Vitals for the past 8 hrs:   Temp Pulse Resp BP SpO2   08/06/17 1142 - (!) 126 - (!) 141/98 -   08/06/17 1130 98 °F (36.7 °C) (!) 113 23 (!) 134/100 96 %   08/06/17 1100 - 90 16 146/83 98 %   08/06/17 1030 - (!) 104 19 117/84 96 %   08/06/17 1023 - 98 18 125/76 97 %   08/06/17 1000 - 82 17 125/76 94 %   08/06/17 0930 - 96 27 113/65 96 %   08/06/17 0915 - 95 20 124/79 98 %   08/06/17 0903 - (!) 134 21 140/65 98 %   08/06/17 0855 97.7 °F (36.5 °C) (!) 135 18 111/82 93 %          Thank you for allowing us to provide you with medical care today. We realize that you have many choices for your emergency care needs. Please choose us in the future for any continued health care needs. Liat Haider, [address] [place] [address].   Office: [phone]            Recent Results (from the past 24 hour(s))   EKG, 12 LEAD, INITIAL    Collection Time: 08/06/17  8:53 AM   Result Value Ref Range    Ventricular Rate 135 BPM    Atrial Rate 270 BPM    QRS Duration 90 ms    Q-T Interval 324 ms    QTC Calculation (Bezet) 486 ms    Calculated R Axis 9 degrees    Calculated T Axis -20 degrees    Diagnosis       Atrial flutter  Nonspecific ST and T wave abnormality  When compared with ECG of 02-JUL-2017 15:08,  Atrial flutter has replaced Sinus rhythm  Vent. rate has increased BY  48 BPM  ST more depressed in Inferior leads  Nonspecific T wave abnormality now evident in Inferior leads  Nonspecific T wave abnormality no longer evident in Lateral leads     EKG, 12 LEAD, INITIAL    Collection Time: 08/06/17  9:17 AM   Result Value Ref Range    Ventricular Rate 97 BPM    Atrial Rate 300 BPM    QRS Duration 100 ms    Q-T Interval 382 ms    QTC Calculation (Bezet) 485 ms    Calculated R Axis 4 degrees    Calculated T Axis -5 degrees    Diagnosis       Atrial fibrillation  Nonspecific ST abnormality  When compared with ECG of 06-AUG-2017 08:53,  MANUAL COMPARISON REQUIRED, DATA IS UNCONFIRMED     CBC WITH AUTOMATED DIFF    Collection Time: 08/06/17  9:25 AM   Result Value Ref Range    WBC 9.1 4.1 - 11.1 K/uL    RBC 4.05 (L) 4.10 - 5.70 M/uL    HGB 10.9 (L) 12.1 - 17.0 g/dL    HCT 35.4 (L) 36.6 - 50.3 %    MCV 87.4 80.0 - 99.0 FL    MCH 26.9 26.0 - 34.0 PG    MCHC 30.8 30.0 - 36.5 g/dL    RDW 18.8 (H) 11.5 - 14.5 %    PLATELET 466 302 - 022 K/uL    NEUTROPHILS 64 32 - 75 %    LYMPHOCYTES 24 12 - 49 %    MONOCYTES 8 5 - 13 %    EOSINOPHILS 4 0 - 7 %    BASOPHILS 0 0 - 1 %    ABS. NEUTROPHILS 5.7 1.8 - 8.0 K/UL    ABS. LYMPHOCYTES 2.2 0.8 - 3.5 K/UL    ABS. MONOCYTES 0.8 0.0 - 1.0 K/UL    ABS. EOSINOPHILS 0.4 0.0 - 0.4 K/UL    ABS.  BASOPHILS 0.0 0.0 - 0.1 K/UL   METABOLIC PANEL, COMPREHENSIVE    Collection Time: 08/06/17  9:25 AM   Result Value Ref Range    Sodium 140 136 - 145 mmol/L    Potassium 4.0 3.5 - 5.1 mmol/L    Chloride 106 97 - 108 mmol/L    CO2 29 21 - 32 mmol/L    Anion gap 5 5 - 15 mmol/L    Glucose 128 (H) 65 - 100 mg/dL    BUN 24 (H) 6 - 20 MG/DL    Creatinine 1.99 (H) 0.70 - 1.30 MG/DL    BUN/Creatinine ratio 12 12 - 20      GFR est AA 39 (L) >60 ml/min/1.73m2    GFR est non-AA 32 (L) >60 ml/min/1.73m2    Calcium 9.2 8.5 - 10.1 MG/DL    Bilirubin, total 0.4 0.2 - 1.0 MG/DL    ALT (SGPT) 9 (L) 12 - 78 U/L    AST (SGOT) 7 (L) 15 - 37 U/L    Alk. phosphatase 105 45 - 117 U/L    Protein, total 7.4 6.4 - 8.2 g/dL    Albumin 3.1 (L) 3.5 - 5.0 g/dL    Globulin 4.3 (H) 2.0 - 4.0 g/dL    A-G Ratio 0.7 (L) 1.1 - 2.2     CK W/ REFLX CKMB    Collection Time: 08/06/17  9:25 AM   Result Value Ref Range    CK 52 39 - 308 U/L   TROPONIN I    Collection Time: 08/06/17  9:25 AM   Result Value Ref Range    Troponin-I, Qt. <0.04 <0.05 ng/mL   PROTHROMBIN TIME + INR    Collection Time: 08/06/17  9:25 AM   Result Value Ref Range    INR 1.1 0.9 - 1.1      Prothrombin time 11.7 (H) 9.0 - 11.1 sec       Xr Chest Port    Result Date: 8/6/2017  EXAM:  XR CHEST PORT INDICATION:  Chest pain and tachycardia started one day ago. Coronary artery disease and chronic hypertension. COMPARISON: Chest views on 7/10/2017 TECHNIQUE: Semiupright portable chest AP view FINDINGS: Defibrillator battery pack and leads are unchanged. Cardiomegaly is unchanged. Tortuous and atherosclerotic aorta is unchanged. The pulmonary vasculature is within normal limits. The lungs and pleural spaces are clear. Osteopenia is unchanged. IMPRESSION: No acute process on portable chest. No change.

## 2017-08-06 NOTE — ED NOTES
Received call back from Dr Faye Petit, stated will still be prolonged time to follow-up with Pt in ED. Informed that he is ok to discharge Pt with follow up with his office next week, received recommendations for medications. Informed Dr Nadya Weinstein of Dr. [facility] recommendations. Will cont to follow-up.

## 2017-08-06 NOTE — ED PROVIDER NOTES
HPI Comments: 80 y.o. male with past medical history significant for CAD, heart failure, and hypertension who presents from home with chief complaint of rapid heart rate. Caretaker noticed the patient's heart rate this morning was at 130, which is unusually high for the patient. Patient also states onset of chest \"heaviness\" yesterday at 1600 (approximately 17 hours ago), which has subsided. Patient mentions hx of defibrillator, bypass of the lower extremities, and stent placement, which was apparently a \"long time ago. \" Patient mentions he is compliant with all of his medications and last took them PTA. Patient mentions he currently follows Dr. Marita Schroeder for his heart and was here 1 month ago for chest pain. Patient denies shortness of breath. There are no other acute medical concerns at this time. Old Chart Review: Per note, patient had a heart catheterization on 07/07/17 performed by Dr. Marita Schroeder that revealed: \"moderate 50% proximal CX stenosis, severe stenosis of the very distal end of OM3, nonocclusive disease of the LAD, normal nondominant RCA, normal LV filling pressure. \" Patient was also admitted from 07/02/17 through the 3rd for left-sided chest pain. Social hx: Tobacco Use: No, Alcohol Use: No    PCP: Dunia Peralta MD  Cardiologist: Rafaela Tariq MD    Note written by Timi Cao, as dictated by Imtiaz Crews MD 9:07 AM      The history is provided by the patient. Past Medical History:   Diagnosis Date    CAD (coronary artery disease)     Heart failure ([address]     Hypertension        Past Surgical History:   Procedure Laterality Date    HX ORTHOPAEDIC      bilateral hip replacement    HX PACEMAKER           No family history on file. Social History     Social History    Marital status:      Spouse name: N/A    Number of children: N/A    Years of education: N/A     Occupational History    Not on file.      Social History Main Topics    Smoking status: Never Smoker    Smokeless tobacco: Never Used    Alcohol use No    Drug use: Not on file    Sexual activity: Not on file     Other Topics Concern    Not on file     Social History Narrative         ALLERGIES: Penicillins    Review of Systems   Constitutional: Negative for activity change, chills and fever. HENT: Negative for nosebleeds, sore throat, trouble swallowing and voice change. Eyes: Negative for visual disturbance. Respiratory: Negative for shortness of breath. Cardiovascular: Positive for chest pain (\"pressure\"). Negative for palpitations. Elevated heart rate   Gastrointestinal: Negative for abdominal pain, constipation, diarrhea and nausea. Genitourinary: Negative for difficulty urinating, dysuria, hematuria and urgency. Musculoskeletal: Negative for back pain, neck pain and neck stiffness. Skin: Negative for color change. Allergic/Immunologic: Negative for immunocompromised state. Neurological: Negative for dizziness, seizures, syncope, weakness, light-headedness, numbness and headaches. Psychiatric/Behavioral: Negative for behavioral problems, confusion, hallucinations, self-injury and suicidal ideas. All other systems reviewed and are negative. Vitals:    08/06/17 0855 08/06/17 0903   BP: 111/82 140/65   Pulse: (!) 135 (!) 134   Resp: 18 21   Temp: 97.7 °F (36.5 °C)    SpO2: 93% 98%   Weight: 88.5 kg (195 lb)    Height: 5' 8\" (1.727 m)             Physical Exam   Constitutional: He is oriented to person, place, and time. He appears well-developed and well-nourished. No distress. HENT:   Head: Normocephalic and atraumatic. Eyes: Pupils are equal, round, and reactive to light. Neck: Normal range of motion. Neck supple. Cardiovascular: Normal rate, regular rhythm and normal heart sounds. Exam reveals no gallop and no friction rub. No murmur heard. Pulmonary/Chest: Effort normal and breath sounds normal. No respiratory distress. He has no wheezes. Abdominal: Soft. Bowel sounds are normal. He exhibits no distension. There is no tenderness. There is no rebound and no guarding. Musculoskeletal: Normal range of motion. Neurological: He is alert and oriented to person, place, and time. Skin: Skin is warm. No rash noted. He is not diaphoretic. Psychiatric: He has a normal mood and affect. His behavior is normal. Judgment and thought content normal.   Nursing note and vitals reviewed. Note written by Timi Husain, as dictated by Jae Smith MD 9:07 AM    [facility]  ED Course   This is an 80-year-old male with past medical history of coronary artery disease, heart failure, hypertension, status post pacemaker, last catheterized approximately one month ago, indicating 50% left circumflex lesion, that intervention, presenting with complaints of tachycardia. Vision states his caregiver was visiting this morning, noticed that the monitor home was indicating a high heart rate brought into the emergency department. Patient states he is without discomfort at this time. He does endorse an episode of chest \"heaviness\" yesterday afternoon approximately 4 PM, it resolved without intervention. Upon arrival the patient is awake, alert, again without discomfort, with a heart rate in the 130s, similar to A. fib with RVR the 12-lead EKG at bedside. She states she is compliant with his medications, has taken them this morning just before coming to the emergency department. Distal exam remarkable for a well-appearing elderly individual in no acute distress, with tachycardia, clear breath sounds. Plan to provide dose of diltiazem, obtain repeat EKG, CMP, CBC, cardiac enzymes, chest x-ray, and make a disposition based on the patient's diagnostics and response to therapy. Procedures    ED EKG interpretation:  Rhythm: atrial fib with RVR; and regular .  Rate (approx.): 135; Axis: normal; ST/T wave: normal;   Note written by Timi Husain, as dictated by Sharon Barton MD 9:00 AM    ED EKG interpretation:  Rhythm: atrial fib; and regular . Rate (approx.): 97; Axis: normal; ST/T wave: normal; J point depression in V3. No acute ST changes. Note written by Timi Stone, as dictated by Sharon Barton MD 9:17 AM    CONSULT NOTE:  11:11 AM Sharon Barton MD spoke with Dr. Heidi Leung, Consult for Cardiology. Discussed available diagnostic tests and clinical findings. He is in agreement with care plans as outlined. Dr. Heidi Leung will call back once he has reviewed the patient's records. PROGRESS NOTE:  11:31 AM  Dr. Heidi Leung returned his call and believes the patient is okay for discharge with oral anticoagulants at this time. Dr. Heidi Leung will also see the patient in the emergency department. PROGRESS NOTE:  2:12 PM  Nurse contacted Dr. Heidi Leung from cardiology, who will not be able to see the patient until 2 hours. Dr. Heidi Leung says patient is okay for discharge with Amiodarone 200 mg BID and anticoagulant therapy. Patient will also need cardiology follow-up within the next week.

## 2017-08-06 NOTE — ED NOTES
Dr. Nadya Weinstein at bedside to inform patient and family about Dr. Faye Petit coming in. Pt continues to go in and out of a-fib/flutter.

## 2017-08-06 NOTE — ED NOTES
Went over discharge instructions with patient and daughter. They verbalized understanding. Gave them two prescriptions to fill for amiodarone and eloquis. Used wc to discharge pt.

## 2017-08-06 NOTE — ED NOTES
Pt converted and HR down to 92. Cardizem order cancelled by MD. Sarwat Lopes on monitor x 3. Denies CP or SOB.

## 2017-08-07 NOTE — CALL BACK NOTE
[facility] Emergency Department Follow Up Call Record    Discharged to : Home/Family Home/Home Health/Skilled Facility/Rehab/Assisted Living/Other_home ______  1) Did you receive your discharge instructions? Yes        2) Do you understand them? Yes         3) Are you able to follow them? Yes          If NO, what can I clarify for you? 4) Do you understand your diagnosis? Yes         5) Do you know which symptoms should prompt you to call the doctor? Yes     6) Were you able to fill and  any medications that were prescribed? Yes     7) You were prescribed __Amiodarone, Eliquis_________for __heart arrithymia_________________. Common side effects of this medication are____rash, bleeding________________. This is not a complete list so please review the forms given from the pharmacy for a complete list.      8) Are there any questions about your medications? No            Have you scheduled any recommended doctors appointments (specialty, PCP) YES. Appointment with Cardiologist on Wednesday 8/09/17. If NO, what barriers are you encountering (transportation/lost contact info/cost/  didnt think necessary/no PCP  9) If discharged with Home Health, has the agency contacted you to schedule visit? N/A   10) Is there anyone available to help you at home (meals, errands, transportation    monitoring) (adult children, neighbors, private duty companions) Yes Daughter is checking in to monitor. 11) Are you on a special diet? No         If YES, do you understand the requirements for this diet? Education provided? 12) If presented with cough, bronchitis, COPD, asthma, is it ok to ask that the   respiratory disease management educator call you? Not applicable      13)  A) If presented with fall, were you issued an assistive device in the ED    Are you using? Yes  Uses own walker  B) If given RX for device, have you obtained? Not applicable       If NO, barriers?   C) Therapist recommended:NO Are you able to implement the suggestions? If NO, barriers to implementation? Not applicable  D) Are you having any difficulties with mobility inside your home?     (steps, bed, tub)No   If YES, ask if the [facility] PT can contact patient and good time and number?  14)  At the end of your discharge instructions, there is information about accessing [facility], have you had a chance to review those? Yes         Do you have any questions about signing up for this service? We encourage our patients to be active participants in their healthcare and this site is one of the ways to do that. It will allow you to access parts of your medical record, email your doctors office, schedule appointments, and request medications refills . 15) Are there any other questions that I can answer for you regarding    your Emergency department visit?  NO             Estimated Call Time:____4:04 PM  _______________ Date/Time:_______________

## 2017-08-11 ENCOUNTER — ANESTHESIA EVENT (OUTPATIENT)
Dept: CARDIAC CATH/INVASIVE PROCEDURES | Age: 82
End: 2017-08-11
Payer: MEDICARE

## 2017-08-11 ENCOUNTER — ANESTHESIA (OUTPATIENT)
Dept: CARDIAC CATH/INVASIVE PROCEDURES | Age: 82
End: 2017-08-11
Payer: MEDICARE

## 2017-08-11 ENCOUNTER — HOSPITAL ENCOUNTER (OUTPATIENT)
Dept: CARDIAC CATH/INVASIVE PROCEDURES | Age: 82
Discharge: HOME | End: 2017-08-11
Attending: INTERNAL MEDICINE | Admitting: INTERNAL MEDICINE
Payer: MEDICARE

## 2017-08-11 VITALS
HEART RATE: 71 BPM | OXYGEN SATURATION: 97 % | BODY MASS INDEX: 30.31 KG/M2 | WEIGHT: 200 LBS | SYSTOLIC BLOOD PRESSURE: 171 MMHG | HEIGHT: 68 IN | DIASTOLIC BLOOD PRESSURE: 94 MMHG | TEMPERATURE: 97.9 F | RESPIRATION RATE: 18 BRPM

## 2017-08-11 LAB
ATRIAL RATE: 78 BPM
CALCULATED R AXIS, ECG10: -3 DEGREES
CALCULATED T AXIS, ECG11: -6 DEGREES
DIAGNOSIS, 93000: NORMAL
Q-T INTERVAL, ECG07: 444 MS
QRS DURATION, ECG06: 100 MS
QTC CALCULATION (BEZET), ECG08: 509 MS
VENTRICULAR RATE, ECG03: 79 BPM

## 2017-08-11 PROCEDURE — 93041 RHYTHM ECG TRACING: CPT

## 2017-08-11 PROCEDURE — 76060000031 HC ANESTHESIA FIRST 0.5 HR

## 2017-08-11 PROCEDURE — 92960 CARDIOVERSION ELECTRIC EXT: CPT

## 2017-08-11 PROCEDURE — 74011250636 HC RX REV CODE- 250/636: Performed by: INTERNAL MEDICINE

## 2017-08-11 PROCEDURE — 74011250636 HC RX REV CODE- 250/636

## 2017-08-11 PROCEDURE — 93325 DOPPLER ECHO COLOR FLOW MAPG: CPT

## 2017-08-11 PROCEDURE — 77030018729 HC ELECTRD DEFIB PAD CARD -B

## 2017-08-11 RX ORDER — PROPOFOL 10 MG/ML
INJECTION, EMULSION INTRAVENOUS AS NEEDED
Status: DISCONTINUED | OUTPATIENT
Start: 2017-08-11 | End: 2017-08-11 | Stop reason: HOSPADM

## 2017-08-11 RX ORDER — SODIUM CHLORIDE 9 MG/ML
25 INJECTION, SOLUTION INTRAVENOUS CONTINUOUS
Status: DISCONTINUED | OUTPATIENT
Start: 2017-08-11 | End: 2017-08-11 | Stop reason: HOSPADM

## 2017-08-11 RX ORDER — SODIUM CHLORIDE 0.9 % (FLUSH) 0.9 %
10 SYRINGE (ML) INJECTION AS NEEDED
Status: DISCONTINUED | OUTPATIENT
Start: 2017-08-11 | End: 2017-08-11

## 2017-08-11 RX ORDER — DIGOXIN 0.25 MG/ML
250 INJECTION INTRAMUSCULAR; INTRAVENOUS
Status: COMPLETED | OUTPATIENT
Start: 2017-08-11 | End: 2017-08-11

## 2017-08-11 RX ORDER — METOPROLOL SUCCINATE 25 MG/1
25 TABLET, EXTENDED RELEASE ORAL 2 TIMES DAILY
COMMUNITY
End: 2017-09-26

## 2017-08-11 RX ORDER — SODIUM CHLORIDE, SODIUM LACTATE, POTASSIUM CHLORIDE, CALCIUM CHLORIDE 600; 310; 30; 20 MG/100ML; MG/100ML; MG/100ML; MG/100ML
INJECTION, SOLUTION INTRAVENOUS
Status: DISCONTINUED | OUTPATIENT
Start: 2017-08-11 | End: 2017-08-11 | Stop reason: HOSPADM

## 2017-08-11 RX ORDER — PROMETHAZINE HYDROCHLORIDE 12.5 MG/1
12.5 TABLET ORAL
COMMUNITY
End: 2017-09-26

## 2017-08-11 RX ORDER — DIGOXIN 125 MCG
0.12 TABLET ORAL
Qty: 30 TAB | Refills: 5 | Status: SHIPPED | OUTPATIENT
Start: 2017-08-11 | End: 2017-09-26

## 2017-08-11 RX ORDER — ATROPINE SULFATE 0.1 MG/ML
1 INJECTION INTRAVENOUS AS NEEDED
Status: DISCONTINUED | OUTPATIENT
Start: 2017-08-11 | End: 2017-08-11

## 2017-08-11 RX ADMIN — PROPOFOL 20 MG: 10 INJECTION, EMULSION INTRAVENOUS at 13:44

## 2017-08-11 RX ADMIN — SODIUM CHLORIDE, SODIUM LACTATE, POTASSIUM CHLORIDE, CALCIUM CHLORIDE: 600; 310; 30; 20 INJECTION, SOLUTION INTRAVENOUS at 13:27

## 2017-08-11 RX ADMIN — PROPOFOL 30 MG: 10 INJECTION, EMULSION INTRAVENOUS at 13:28

## 2017-08-11 RX ADMIN — SODIUM CHLORIDE 25 ML/HR: 900 INJECTION, SOLUTION INTRAVENOUS at 11:30

## 2017-08-11 RX ADMIN — PROPOFOL 40 MG: 10 INJECTION, EMULSION INTRAVENOUS at 13:30

## 2017-08-11 RX ADMIN — DIGOXIN 250 MCG: 0.25 INJECTION INTRAMUSCULAR; INTRAVENOUS at 15:09

## 2017-08-11 NOTE — PROGRESS NOTES
TRANSFER - IN REPORT:    Verbal report received from Carmen Chapa on [address]  being received from procedure for routine progression of care. Report consisted of patients Situation, Background, Assessment and Recommendations(SBAR). Information from the following report(s) Procedure Summary, MAR and Recent Results was reviewed with the receiving clinician. Opportunity for questions and clarification was provided. Assessment completed upon patients arrival to [address] [place] [address] and care assumed. Cardiac Cath Lab Recovery Arrival Note:    Oriana Rock Sr. arrived to [facility]. Patient procedure= PAMELA/CV. Patient on cardiac monitor, non-invasive blood pressure, SPO2 monitor. On  O2 @ 2 lpm via n/c. IV  of nacl on pump at 25 ml/hr. Patient status doing well without problems. Patient is A&Ox 4. Patient reports no complaints. PROCEDURE SITE CHECK:    Procedure site:, no pain/discomfort reported at procedure site. No change in patient status. Continue to monitor patient and status.

## 2017-08-11 NOTE — IP AVS SNAPSHOT
[address] 
[phone] Patient: Patty Wagner Sr. MRN: [id number] BQL:7/4/0448 You are allergic to the following Allergen Reactions Penicillins Itching Recent Documentation Height Weight BMI Smoking Status 1.727 m 90.7 kg 30.41 kg/m2 Never Smoker Emergency Contacts Name Discharge Info Relation Home Work Mobile Marlo Ugalde DISCHARGE CAREGIVER [3] Child [2] [phone] About your hospitalization You were admitted on:  August 11, 2017 You last received care in the:  [address], [facility] Dr CATH LAB You were discharged on:  August 11, 2017 Unit phone number:  [phone] Why you were hospitalized Your primary diagnosis was:  Not on File Providers Seen During Your Hospitalizations Provider Role Specialty Primary office phone Tony Kyle MD Attending Provider Cardiology [phone] Your Primary Care Physician (PCP) Primary Care Physician Office Phone Office Fax Deidre Green [phone] Follow-up Information Follow up With Details Comments Contact Info Sepideh Polo MD   [address] [place] [address] 
[phone] Tony Kyle MD Schedule an appointment as soon as possible for a visit in 1 week  [address] [facility] [address] [place] [address] 
[phone] Current Discharge Medication List  
  
START taking these medications Dose & Instructions Dispensing Information Comments Morning Noon Evening Bedtime  
 digoxin 0.125 mg tablet Commonly known as:  LANOXIN Your last dose was: Your next dose is:    
   
   
 Dose:  0.125 mg Take 1 Tab by mouth every Monday, Wednesday, Friday. Quantity:  30 Tab Refills:  5 CONTINUE these medications which have NOT CHANGED Dose & Instructions Dispensing Information Comments Morning Noon Evening Bedtime  
 albuterol 90 mcg/actuation inhaler Commonly known as:  PROVENTIL HFA, VENTOLIN HFA, PROAIR HFA Your last dose was: Your next dose is:    
   
   
 Dose:  2 Puff Take 2 Puffs by inhalation every four (4) hours as needed. Refills:  0  
     
   
   
   
  
 apixaban 2.5 mg tablet Commonly known as:  Margarie Luciano Your last dose was: Your next dose is:    
   
   
 Dose:  2.5 mg Take 1 Tab by mouth two (2) times a day for 30 days. Quantity:  60 Tab Refills:  0  
     
   
   
   
  
 aspirin delayed-release 81 mg tablet Your last dose was: Your next dose is:    
   
   
 Dose:  81 mg Take 81 mg by mouth daily. Refills:  0  
     
   
   
   
  
 bumetanide 1 mg tablet Commonly known as:  Yrn Sourav Your last dose was: Your next dose is:    
   
   
 Dose:  1 mg Take 1 mg by mouth daily. Refills:  0  
     
   
   
   
  
 ferrous sulfate 325 mg (65 mg iron) tablet Your last dose was: Your next dose is:    
   
   
 Dose:  325 mg Take 325 mg by mouth Daily (before breakfast). Refills:  0 LIPITOR 80 mg tablet Generic drug:  atorvastatin Your last dose was: Your next dose is:    
   
   
 Dose:  80 mg Take 80 mg by mouth nightly. Refills:  0  
     
   
   
   
  
 metoprolol succinate 25 mg XL tablet Commonly known as:  TOPROL-XL Your last dose was: Your next dose is:    
   
   
 Dose:  25 mg Take 25 mg by mouth two (2) times a day. Refills:  0  
     
   
   
   
  
 nitroglycerin 0.4 mg SL tablet Commonly known as:  NITROSTAT Your last dose was: Your next dose is:    
   
   
 Dose:  0.4 mg  
1 Tab by SubLINGual route every five (5) minutes as needed for Chest Pain for up to 3 doses. Quantity:  1 Bottle Refills:  3 promethazine 12.5 mg tablet Commonly known as:  PHENERGAN Your last dose was: Your next dose is:    
   
   
 Dose:  12.5 mg Take 12.5 mg by mouth every four (4) hours as needed for Nausea. Refills:  0 QVAR 80 mcg/actuation [employer] Generic drug:  beclomethasone Your last dose was: Your next dose is:    
   
   
 Dose:  2 Puff Take 2 Puffs by inhalation two (2) times a day. Refills:  0 STOP taking these medications   
 carvedilol 25 mg tablet Commonly known as:  COREG  
   
  
 hydrALAZINE 25 mg tablet Commonly known as:  APRESOLINE Where to Get Your Medications Information on where to get these meds will be given to you by the nurse or doctor. ! Ask your nurse or doctor about these medications  
  digoxin 0.125 mg tablet Discharge Instructions None Discharge Orders None Introducing [facility] SERVICES! Willadean Saint introduces [employer] patient portal. Now you can access parts of your medical record, email your doctor's office, and request medication refills online. 1. In your internet browser, go to https://[employer]. [employer] 2. Click on the First Time User? Click Here link in the Sign In box. You will see the New Member Sign Up page. 3. Enter your [employer] Access Code exactly as it appears below. You will not need to use this code after youve completed the sign-up process. If you do not sign up before the expiration date, you must request a new code. · [employer] Access Code: [email or web address] Expires: 9/11/2017 10:31 AM 
 
4. Enter the last four digits of your Social Security Number (xxxx) and Date of Birth (mm/dd/yyyy) as indicated and click Submit. You will be taken to the next sign-up page. 5. Create a [employer] ID. This will be your [employer] login ID and cannot be changed, so think of one that is secure and easy to remember. 6. Create a [employer] password. You can change your password at any time. 7. Enter your Password Reset Question and Answer. This can be used at a later time if you forget your password. 8. Enter your e-mail address. You will receive e-mail notification when new information is available in [address] 9. Click Sign Up. You can now view and download portions of your medical record. 10. Click the Download Summary menu link to download a portable copy of your medical information. If you have questions, please visit the Frequently Asked Questions section of the [employer] website. Remember, [employer] is NOT to be used for urgent needs. For medical emergencies, dial 911. Now available from your iPhone and Android! General Information Please provide this summary of care documentation to your next provider. Patient Signature:  ____________________________________________________________ Date:  ____________________________________________________________  
  
[facility] Signature:  ____________________________________________________________ Date:  ____________________________________________________________

## 2017-08-11 NOTE — PROCEDURES
Cardiac Catheterization Procedure Note   Patient: Deanne Chaney Sr. MRN: [id number]  SSN: [id number]   YOB: 1930 Age: 80 y.o.   Sex: male    Date of Procedure: 8/11/2017   Pre-procedure Diagnosis: Atrial Fibrillation/Atrial Flutter  Post-procedure Diagnosis: Congestive Heart Failure and successful conversion to sinus rhythm  Procedure: PAMELA and Cardioversion  :  Dr. Concetta Cabrera MD    Assistant(s):  None  Anesthesia: Moderate Sedation   Estimated Blood Loss: Less than 10 mL   Specimens Removed: None  Findings: heavy continuous spontaneous echo contrast in LA, SOL but no thrombus; converted to sinus rhythm after 1 shock of 418 joules  Complications: None   Implants:  None  Signed by:  Concetta Cabrera MD  8/11/2017  2:17 PM

## 2017-08-11 NOTE — ANESTHESIA POSTPROCEDURE EVALUATION
Post-Anesthesia Evaluation and Assessment    Patient: Dylan Parker Sr. MRN: [id number]  SSN: [id number]    YOB: 1930  Age: 80 y.o. Sex: male       Cardiovascular Function/Vital Signs  Visit Vitals    /76    Pulse 76    Temp 36.6 °C (97.9 °F)    Resp 12    Ht 5' 8\" (1.727 m)    Wt 90.7 kg (200 lb)    SpO2 100%    BMI 30.41 kg/m2       Patient is status post general anesthesia for * No procedures listed *. Nausea/Vomiting: None    Postoperative hydration reviewed and adequate. Pain:  Pain Scale 1: Numeric (0 - 10) (08/11/17 1100)  Pain Intensity 1: 0 (08/11/17 1100)   Managed    Neurological Status: At baseline    Mental Status and Level of Consciousness: Arousable    Pulmonary Status:   O2 Device: Nasal cannula (08/11/17 1359)   Adequate oxygenation and airway patent    Complications related to anesthesia: None    Post-anesthesia assessment completed.  No concerns    Signed By: Peggy Chris MD     August 11, 2017

## 2017-08-11 NOTE — PROGRESS NOTES
Cardiac Cath Lab Recovery Arrival Note:      Alexey Rankin. arrived to Cardiac Cath Lab, Recovery Area. Staff introduced to patient. Patient identifiers verified with NAME and DATE OF BIRTH. Procedure verified with patient. Consent forms reviewed and signed by patient or authorized representative and verified. Allergies verified. Patient and family oriented to department. Patient and family informed of procedure and plan of care. Questions answered with review. Patient prepped for procedure, per orders from physician, prior to arrival.    Patient on cardiac monitor, non-invasive blood pressure, SPO2 monitor. On room. Patient is A&Ox 4. Patient reports no complaints. Patient in stretcher, in low position, with side rails up, call bell within reach, patient instructed to call if assistance as needed. Patient prep in: [address], [place].    Patient family has pager # [phone]  Family in: [facility].   Prep by: Michael Davidson RN    Pre PAMELA/CV teaching completed

## 2017-08-11 NOTE — PROGRESS NOTES
Ambulated 75 ft, gait steady, voided. Back to stretcher assisted with dressing. 1630  Discharge instructions reviewed with pt and daughter. Teach back method used. Pt and daughter has rx for digoxin and knows to take on Mon, Wed and Fri., that he was given a dose today. 1645 discharged via w/c with daughter and his belongings.

## 2017-08-11 NOTE — ANESTHESIA PREPROCEDURE EVALUATION
Anesthetic History   No history of anesthetic complications            Review of Systems / Medical History  Patient summary reviewed, nursing notes reviewed and pertinent labs reviewed    Pulmonary  Within defined limits                 Neuro/Psych   Within defined limits           Cardiovascular  Within defined limits  Hypertension      CHF  Dysrhythmias   Past MI and CAD    Exercise tolerance: <4 METS     GI/Hepatic/Renal  Within defined limits              Endo/Other  Within defined limits           Other Findings              Physical Exam    Airway  Mallampati: III  TM Distance: 4 - 6 cm  Neck ROM: normal range of motion   Mouth opening: Normal     Cardiovascular  Regular rate and rhythm,  S1 and S2 normal,  no murmur, click, rub, or gallop             Dental  No notable dental hx       Pulmonary  Breath sounds clear to auscultation               Abdominal  GI exam deferred       Other Findings            Anesthetic Plan    ASA: 3  Anesthesia type: general          Induction: Intravenous  Anesthetic plan and risks discussed with: Patient

## 2017-08-11 NOTE — PROGRESS NOTES
1315:   Cardiac Cath Lab Procedure Area Arrival Note:    Evon Long. arrived to Cardiac Cath Lab, Procedure Area. Patient identifiers verified with NAME and DATE OF BIRTH. Procedure verified with patient. Consent forms verified. Allergies verified. Patient informed of procedure and plan of care. Questions answered with review. Patient voiced understanding of procedure and plan of care. Patient on cardiac monitor, non-invasive blood pressure, SPO2 monitor. On RA placed on O2 @ 2 lpm via NC.  IV of NS on pump at 25 ml/hr. Patient status doing well without problems. Patient is A&Ox 4. Patient reports no pain. Patient medicated during procedure with orders obtained and verified by Dr. Marita Schroeder. Refer to patients Cardiac Cath Lab PROCEDURE REPORT for vital signs, assessment, status, and response during procedure, printed at end of case. Printed report on chart or scanned into chart. 1350: Transfer to [address] [place] [address] from Procedure Area    Verbal report given to [facility] on Evon Long. being transferred to Cardiac Cath Lab  for routine post - op   Patient is post PAMELA/CV procedure. Patient stable upon transfer to . Report consisted of patients Situation, Background, Assessment and   Recommendations(SBAR). Information from the following report(s) Procedure Summary and MAR was reviewed with the receiving nurse. Opportunity for questions and clarification was provided. Patient medicated during procedure with orders obtained and verified by Dr. Marita Schroeder. Refer to patient PROCEDURE REPORT for vital signs, assessment, status, and response during procedure.

## 2017-08-28 NOTE — PROCEDURES
[address] [facility]  [employer] PROCEDURE       Name:  Norma Curtis   MR#:  [id number]   :  1930   Account #:  [de-identified]    Date of Procedure:  2017   Date of Adm:  2017       STUDY/CINE: **    ANESTHESIA: Propofol. PROCEDURE: This is the cardioversion portion of a PAMELA cardioversion   for a patient in atrial fibrillation. The patient was brought to the cath lab   as an outpatient, having already been started on anticoagulation,   however, only a few doses. DESCRIPTION OF PROCEDURE: He had a transesophageal   echocardiogram prior to cardioversion, which confirmed that there was   no clot. The patient was then given sedation by Anesthesia, and he   received 200 joules of biphasic energy x1, converting to sinus rhythm   without incident. He was recovered in the cath lab procedure area for   several minutes, then taken to recovery and ultimately discharged   home the same day. CONCLUSIONS: Successful cardioversion of atrial fibrillation to sinus   rhythm using propofol administered by the anesthesia department. No   complications. SPECIMENS REMOVED: None. ESTIMATED BLOOD LOSS: None.         MD ALEXIS Bassett / ASHLEY   D:  2017   13:14   T:  2017   09:27   Job #:  [id number]

## 2017-09-26 ENCOUNTER — HOSPITAL ENCOUNTER (OUTPATIENT)
Age: 82
Setting detail: OBSERVATION
Discharge: HOME | End: 2017-09-27
Attending: EMERGENCY MEDICINE | Admitting: INTERNAL MEDICINE
Payer: MEDICARE

## 2017-09-26 ENCOUNTER — APPOINTMENT (OUTPATIENT)
Dept: GENERAL RADIOLOGY | Age: 82
End: 2017-09-26
Attending: EMERGENCY MEDICINE
Payer: MEDICARE

## 2017-09-26 DIAGNOSIS — R07.9 CHEST PAIN, UNSPECIFIED TYPE: ICD-10-CM

## 2017-09-26 DIAGNOSIS — I48.92 ATRIAL FLUTTER, UNSPECIFIED TYPE (HCC): ICD-10-CM

## 2017-09-26 DIAGNOSIS — I50.23 ACUTE ON CHRONIC SYSTOLIC CONGESTIVE HEART FAILURE (HCC): Primary | ICD-10-CM

## 2017-09-26 LAB
ALBUMIN SERPL-MCNC: 2.9 G/DL (ref 3.5–5)
ALBUMIN/GLOB SERPL: 0.7 {RATIO} (ref 1.1–2.2)
ALP SERPL-CCNC: 112 U/L (ref 45–117)
ALT SERPL-CCNC: 20 U/L (ref 12–78)
ANION GAP SERPL CALC-SCNC: 6 MMOL/L (ref 5–15)
ANION GAP SERPL CALC-SCNC: 8 MMOL/L (ref 5–15)
AST SERPL-CCNC: 25 U/L (ref 15–37)
ATRIAL RATE: 277 BPM
ATRIAL RATE: 65 BPM
BASOPHILS # BLD: 0 K/UL (ref 0–0.1)
BASOPHILS NFR BLD: 0 % (ref 0–1)
BILIRUB SERPL-MCNC: 0.5 MG/DL (ref 0.2–1)
BNP SERPL-MCNC: 6466 PG/ML (ref 0–450)
BUN SERPL-MCNC: 19 MG/DL (ref 6–20)
BUN SERPL-MCNC: 21 MG/DL (ref 6–20)
BUN/CREAT SERPL: 11 (ref 12–20)
BUN/CREAT SERPL: 11 (ref 12–20)
CALCIUM SERPL-MCNC: 8.4 MG/DL (ref 8.5–10.1)
CALCIUM SERPL-MCNC: 8.7 MG/DL (ref 8.5–10.1)
CALCULATED R AXIS, ECG10: -7 DEGREES
CALCULATED R AXIS, ECG10: 37 DEGREES
CALCULATED T AXIS, ECG11: -41 DEGREES
CALCULATED T AXIS, ECG11: -49 DEGREES
CHLORIDE SERPL-SCNC: 107 MMOL/L (ref 97–108)
CHLORIDE SERPL-SCNC: 107 MMOL/L (ref 97–108)
CO2 SERPL-SCNC: 24 MMOL/L (ref 21–32)
CO2 SERPL-SCNC: 26 MMOL/L (ref 21–32)
CREAT SERPL-MCNC: 1.76 MG/DL (ref 0.7–1.3)
CREAT SERPL-MCNC: 1.83 MG/DL (ref 0.7–1.3)
DIAGNOSIS, 93000: NORMAL
DIAGNOSIS, 93000: NORMAL
EOSINOPHIL # BLD: 0.4 K/UL (ref 0–0.4)
EOSINOPHIL NFR BLD: 4 % (ref 0–7)
ERYTHROCYTE [DISTWIDTH] IN BLOOD BY AUTOMATED COUNT: 16.7 % (ref 11.5–14.5)
GLOBULIN SER CALC-MCNC: 4.4 G/DL (ref 2–4)
GLUCOSE SERPL-MCNC: 105 MG/DL (ref 65–100)
GLUCOSE SERPL-MCNC: 109 MG/DL (ref 65–100)
HCT VFR BLD AUTO: 32.9 % (ref 36.6–50.3)
HGB BLD-MCNC: 10.4 G/DL (ref 12.1–17)
LYMPHOCYTES # BLD: 1.7 K/UL (ref 0.8–3.5)
LYMPHOCYTES NFR BLD: 17 % (ref 12–49)
MCH RBC QN AUTO: 28.7 PG (ref 26–34)
MCHC RBC AUTO-ENTMCNC: 31.6 G/DL (ref 30–36.5)
MCV RBC AUTO: 90.6 FL (ref 80–99)
MONOCYTES # BLD: 0.9 K/UL (ref 0–1)
MONOCYTES NFR BLD: 10 % (ref 5–13)
NEUTS SEG # BLD: 6.8 K/UL (ref 1.8–8)
NEUTS SEG NFR BLD: 69 % (ref 32–75)
PLATELET # BLD AUTO: 233 K/UL (ref 150–400)
POTASSIUM SERPL-SCNC: 4 MMOL/L (ref 3.5–5.1)
POTASSIUM SERPL-SCNC: 4.5 MMOL/L (ref 3.5–5.1)
PROT SERPL-MCNC: 7.3 G/DL (ref 6.4–8.2)
Q-T INTERVAL, ECG07: 348 MS
Q-T INTERVAL, ECG07: 424 MS
QRS DURATION, ECG06: 100 MS
QRS DURATION, ECG06: 92 MS
QTC CALCULATION (BEZET), ECG08: 470 MS
QTC CALCULATION (BEZET), ECG08: 513 MS
RBC # BLD AUTO: 3.63 M/UL (ref 4.1–5.7)
SODIUM SERPL-SCNC: 139 MMOL/L (ref 136–145)
SODIUM SERPL-SCNC: 139 MMOL/L (ref 136–145)
TROPONIN I SERPL-MCNC: <0.04 NG/ML
TROPONIN I SERPL-MCNC: <0.04 NG/ML
VENTRICULAR RATE, ECG03: 131 BPM
VENTRICULAR RATE, ECG03: 74 BPM
WBC # BLD AUTO: 9.9 K/UL (ref 4.1–11.1)

## 2017-09-26 PROCEDURE — 96365 THER/PROPH/DIAG IV INF INIT: CPT

## 2017-09-26 PROCEDURE — 84484 ASSAY OF TROPONIN QUANT: CPT | Performed by: EMERGENCY MEDICINE

## 2017-09-26 PROCEDURE — 71010 XR CHEST PORT: CPT

## 2017-09-26 PROCEDURE — 83880 ASSAY OF NATRIURETIC PEPTIDE: CPT | Performed by: EMERGENCY MEDICINE

## 2017-09-26 PROCEDURE — 74011000258 HC RX REV CODE- 258: Performed by: INTERNAL MEDICINE

## 2017-09-26 PROCEDURE — 93005 ELECTROCARDIOGRAM TRACING: CPT

## 2017-09-26 PROCEDURE — 99218 HC RM OBSERVATION: CPT

## 2017-09-26 PROCEDURE — 80048 BASIC METABOLIC PNL TOTAL CA: CPT | Performed by: INTERNAL MEDICINE

## 2017-09-26 PROCEDURE — 74011000258 HC RX REV CODE- 258: Performed by: EMERGENCY MEDICINE

## 2017-09-26 PROCEDURE — 74011000250 HC RX REV CODE- 250: Performed by: EMERGENCY MEDICINE

## 2017-09-26 PROCEDURE — 85025 COMPLETE CBC W/AUTO DIFF WBC: CPT | Performed by: EMERGENCY MEDICINE

## 2017-09-26 PROCEDURE — 36415 COLL VENOUS BLD VENIPUNCTURE: CPT | Performed by: EMERGENCY MEDICINE

## 2017-09-26 PROCEDURE — 74011250637 HC RX REV CODE- 250/637: Performed by: EMERGENCY MEDICINE

## 2017-09-26 PROCEDURE — 96375 TX/PRO/DX INJ NEW DRUG ADDON: CPT

## 2017-09-26 PROCEDURE — 80053 COMPREHEN METABOLIC PANEL: CPT | Performed by: EMERGENCY MEDICINE

## 2017-09-26 PROCEDURE — 96376 TX/PRO/DX INJ SAME DRUG ADON: CPT

## 2017-09-26 PROCEDURE — 96366 THER/PROPH/DIAG IV INF ADDON: CPT

## 2017-09-26 PROCEDURE — 74011000250 HC RX REV CODE- 250: Performed by: INTERNAL MEDICINE

## 2017-09-26 PROCEDURE — 99285 EMERGENCY DEPT VISIT HI MDM: CPT

## 2017-09-26 PROCEDURE — 74011250637 HC RX REV CODE- 250/637: Performed by: INTERNAL MEDICINE

## 2017-09-26 RX ORDER — SODIUM CHLORIDE 0.9 % (FLUSH) 0.9 %
5-10 SYRINGE (ML) INJECTION EVERY 8 HOURS
Status: DISCONTINUED | OUTPATIENT
Start: 2017-09-26 | End: 2017-09-27 | Stop reason: HOSPADM

## 2017-09-26 RX ORDER — SODIUM CHLORIDE 0.9 % (FLUSH) 0.9 %
5-10 SYRINGE (ML) INJECTION AS NEEDED
Status: DISCONTINUED | OUTPATIENT
Start: 2017-09-26 | End: 2017-09-27 | Stop reason: HOSPADM

## 2017-09-26 RX ORDER — CARVEDILOL 6.25 MG/1
6.25 TABLET ORAL 2 TIMES DAILY
Status: DISCONTINUED | OUTPATIENT
Start: 2017-09-26 | End: 2017-09-27 | Stop reason: HOSPADM

## 2017-09-26 RX ORDER — ATORVASTATIN CALCIUM 40 MG/1
80 TABLET, FILM COATED ORAL
Status: DISCONTINUED | OUTPATIENT
Start: 2017-09-26 | End: 2017-09-27 | Stop reason: HOSPADM

## 2017-09-26 RX ORDER — NALOXONE HYDROCHLORIDE 0.4 MG/ML
0.4 INJECTION, SOLUTION INTRAMUSCULAR; INTRAVENOUS; SUBCUTANEOUS AS NEEDED
Status: DISCONTINUED | OUTPATIENT
Start: 2017-09-26 | End: 2017-09-27 | Stop reason: HOSPADM

## 2017-09-26 RX ORDER — NITROGLYCERIN 0.4 MG/1
0.4 TABLET SUBLINGUAL
Status: DISCONTINUED | OUTPATIENT
Start: 2017-09-26 | End: 2017-09-27 | Stop reason: HOSPADM

## 2017-09-26 RX ORDER — ADHESIVE BANDAGE
30 BANDAGE TOPICAL DAILY PRN
Status: DISCONTINUED | OUTPATIENT
Start: 2017-09-26 | End: 2017-09-27 | Stop reason: HOSPADM

## 2017-09-26 RX ORDER — CARVEDILOL 3.12 MG/1
6.25 TABLET ORAL 2 TIMES DAILY
COMMUNITY
End: 2018-07-11

## 2017-09-26 RX ORDER — BUMETANIDE 0.25 MG/ML
1 INJECTION INTRAMUSCULAR; INTRAVENOUS DAILY
Status: DISCONTINUED | OUTPATIENT
Start: 2017-09-26 | End: 2017-09-27 | Stop reason: HOSPADM

## 2017-09-26 RX ORDER — DILTIAZEM HYDROCHLORIDE 5 MG/ML
10 INJECTION INTRAVENOUS
Status: COMPLETED | OUTPATIENT
Start: 2017-09-26 | End: 2017-09-26

## 2017-09-26 RX ORDER — ACETAMINOPHEN 325 MG/1
650 TABLET ORAL
Status: DISCONTINUED | OUTPATIENT
Start: 2017-09-26 | End: 2017-09-27 | Stop reason: HOSPADM

## 2017-09-26 RX ORDER — BUMETANIDE 0.25 MG/ML
1 INJECTION INTRAMUSCULAR; INTRAVENOUS
Status: COMPLETED | OUTPATIENT
Start: 2017-09-26 | End: 2017-09-26

## 2017-09-26 RX ORDER — ONDANSETRON 2 MG/ML
4 INJECTION INTRAMUSCULAR; INTRAVENOUS
Status: DISCONTINUED | OUTPATIENT
Start: 2017-09-26 | End: 2017-09-27 | Stop reason: HOSPADM

## 2017-09-26 RX ADMIN — DILTIAZEM HYDROCHLORIDE 10 MG: 5 INJECTION INTRAVENOUS at 03:16

## 2017-09-26 RX ADMIN — BUMETANIDE 1 MG: 0.25 INJECTION INTRAMUSCULAR; INTRAVENOUS at 10:37

## 2017-09-26 RX ADMIN — BUMETANIDE 1 MG: 0.25 INJECTION INTRAMUSCULAR; INTRAVENOUS at 03:16

## 2017-09-26 RX ADMIN — CARVEDILOL 6.25 MG: 6.25 TABLET, FILM COATED ORAL at 10:19

## 2017-09-26 RX ADMIN — ATORVASTATIN CALCIUM 80 MG: 40 TABLET, FILM COATED ORAL at 21:21

## 2017-09-26 RX ADMIN — DILTIAZEM HYDROCHLORIDE 5 MG/HR: 5 INJECTION, SOLUTION INTRAVENOUS at 19:10

## 2017-09-26 RX ADMIN — APIXABAN 2.5 MG: 2.5 TABLET, FILM COATED ORAL at 09:21

## 2017-09-26 RX ADMIN — Medication 10 ML: at 14:33

## 2017-09-26 RX ADMIN — Medication 10 ML: at 21:26

## 2017-09-26 RX ADMIN — APIXABAN 2.5 MG: 2.5 TABLET, FILM COATED ORAL at 18:17

## 2017-09-26 RX ADMIN — ACETAMINOPHEN 650 MG: 325 TABLET, FILM COATED ORAL at 19:16

## 2017-09-26 RX ADMIN — DILTIAZEM HYDROCHLORIDE 10 MG/HR: 5 INJECTION, SOLUTION INTRAVENOUS at 05:06

## 2017-09-26 RX ADMIN — NITROGLYCERIN 0.5 INCH: 20 OINTMENT TOPICAL at 05:23

## 2017-09-26 RX ADMIN — CARVEDILOL 6.25 MG: 6.25 TABLET, FILM COATED ORAL at 18:17

## 2017-09-26 NOTE — PROGRESS NOTES
TRANSFER - IN REPORT:    Verbal report received from Roxy(name) on [facility].  being received from ED(unit) for routine progression of care      Report consisted of patients Situation, Background, Assessment and   Recommendations(SBAR). Information from the following report(s) SBAR, Kardex and MAR was reviewed with the receiving nurse. Opportunity for questions and clarification was provided. Assessment completed upon patients arrival to unit and care assumed.

## 2017-09-26 NOTE — CONSULTS
[facility] CARDIAC ELECTROPHYSIOLOGY CONSULT              Date of  Admission: 9/26/2017  2:23 AM         Assessment and Plan:  Odalis Gaming Sr. is admitted with recurrent a. Flutter. # a. Flutter - typical on ECG associated with RVR and symptoms. There is mention of AF in prior notes, however, all available ECGs and office device check egms are suggestive of a. Flutter. - Will add digoxin for rate controla nd short term IV dilt is ok. - Discussed a. Flutter ablation first and if recurs consider av node ablation.  - Will interrogate device to see if AF or a. flutter    # CMY - EF 35%, med rx limited, currently on coreg low dose. Not on acei/arb presumably due to low bp. Would need CRT upgrade if we are considering av node ablation. - compensated  # CP - recent cath was stable. REASON FOR CONSULT: A. flutter  Primary Cardiologist: Haseeb Ley    HPI:  80 yom with history of cmy, CAD and recurrent a. Flutter admitted with cp and sob. He called a week ago with recurerent symptoms of a. Flutter and coreg was increased continued to have worsening symptoms prompting ER visit. Kent worse yesterday and noted to have nausea with this. He had a PAMELA/DCCV in 8/17 with Dr. Uma Drew for a. Flutter. He saw Dr. Jacinto Moon in office and was switched from metoprolol to coreg. He denies any dizziness and syncope. He has tried amidoaroen and metoprolol previously but both caused nausea. His medication list reviewed. REports compliance to eliquis.       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 No pertinent family history. Social History     Social History    Marital status:      Spouse name: N/A    Number of children: N/A    Years of education: N/A     Occupational History    Not on file. Social History Main Topics    Smoking status: Never Smoker    Smokeless tobacco: Never Used    Alcohol use No    Drug use: Not on file    Sexual activity: Not on file     Other Topics Concern    Not on file     Social History Narrative       Current medications:  Coreg 3.125 po bid  bumex 0.5 mg daily  eliquis 2.5 bid  Atorva 80 daily           Review of Symptoms:  A comprehensive review of systems was negative in 11 points other than stated above in HPI. Physical Exam    Visit Vitals    /88    Pulse 99    Temp 97.9 °F (36.6 °C)    Resp 22    Ht 5' 8\" (1.727 m)    Wt 94 kg (207 lb 2 oz)    SpO2 100%    BMI 31.49 kg/m2     Skin warm and dry  HEENT: WNL  Oropharynx without exudate. Neck supple  Lungs clear  Heart - IRR, Normal S1/ S2   No Mummurs, click or Rubs  No S3 or S4  Abdomen soft and non tender,   Pulses 2+ throughout   Neuro:  Normal facial grimace,  Moves all extremities. AAAO   Psych: unanxious    Labs:   Recent Results (from the past 24 hour(s))   EKG, 12 LEAD, INITIAL    Collection Time: 09/26/17  2:18 AM   Result Value Ref Range    Ventricular Rate 131 BPM    Atrial Rate 65 BPM    QRS Duration 92 ms    Q-T Interval 348 ms    QTC Calculation (Bezet) 513 ms    Calculated R Axis 37 degrees    Calculated T Axis -41 degrees    Diagnosis       Supraventricular tachycardia  ST & T wave abnormality, consider inferior ischemia  When compared with ECG of 11-AUG-2017 13:58,  Sinus rhythm has replaced Atrial fibrillation  Vent.  rate has increased BY  52 BPM     NT-PRO BNP    Collection Time: 09/26/17  2:45 AM   Result Value Ref Range    NT pro-BNP 6466 (H) 0 - 450 PG/ML   TROPONIN I    Collection Time: 09/26/17  2:56 AM   Result Value Ref Range    Troponin-I, Qt. <0.04 <1.78 ng/mL   METABOLIC PANEL, COMPREHENSIVE    Collection Time: 09/26/17  2:56 AM   Result Value Ref Range    Sodium 139 136 - 145 mmol/L    Potassium 4.5 3.5 - 5.1 mmol/L    Chloride 107 97 - 108 mmol/L    CO2 24 21 - 32 mmol/L    Anion gap 8 5 - 15 mmol/L    Glucose 109 (H) 65 - 100 mg/dL    BUN 21 (H) 6 - 20 MG/DL    Creatinine 1.83 (H) 0.70 - 1.30 MG/DL    BUN/Creatinine ratio 11 (L) 12 - 20      GFR est AA 43 (L) >60 ml/min/1.73m2    GFR est non-AA 35 (L) >60 ml/min/1.73m2    Calcium 8.4 (L) 8.5 - 10.1 MG/DL    Bilirubin, total 0.5 0.2 - 1.0 MG/DL    ALT (SGPT) 20 12 - 78 U/L    AST (SGOT) 25 15 - 37 U/L    Alk. phosphatase 112 45 - 117 U/L    Protein, total 7.3 6.4 - 8.2 g/dL    Albumin 2.9 (L) 3.5 - 5.0 g/dL    Globulin 4.4 (H) 2.0 - 4.0 g/dL    A-G Ratio 0.7 (L) 1.1 - 2.2     CBC WITH AUTOMATED DIFF    Collection Time: 09/26/17  2:56 AM   Result Value Ref Range    WBC 9.9 4.1 - 11.1 K/uL    RBC 3.63 (L) 4.10 - 5.70 M/uL    HGB 10.4 (L) 12.1 - 17.0 g/dL    HCT 32.9 (L) 36.6 - 50.3 %    MCV 90.6 80.0 - 99.0 FL    MCH 28.7 26.0 - 34.0 PG    MCHC 31.6 30.0 - 36.5 g/dL    RDW 16.7 (H) 11.5 - 14.5 %    PLATELET 419 265 - 510 K/uL    NEUTROPHILS 69 32 - 75 %    LYMPHOCYTES 17 12 - 49 %    MONOCYTES 10 5 - 13 %    EOSINOPHILS 4 0 - 7 %    BASOPHILS 0 0 - 1 %    ABS. NEUTROPHILS 6.8 1.8 - 8.0 K/UL    ABS. LYMPHOCYTES 1.7 0.8 - 3.5 K/UL    ABS. MONOCYTES 0.9 0.0 - 1.0 K/UL    ABS. EOSINOPHILS 0.4 0.0 - 0.4 K/UL    ABS.  BASOPHILS 0.0 0.0 - 0.1 K/UL   EKG, 12 LEAD, INITIAL    Collection Time: 09/26/17  3:27 AM   Result Value Ref Range    Ventricular Rate 74 BPM    Atrial Rate 277 BPM    QRS Duration 100 ms    Q-T Interval 424 ms    QTC Calculation (Bezet) 470 ms    Calculated R Axis -7 degrees    Calculated T Axis -49 degrees    Diagnosis Atrial flutter with variable AV block with premature ventricular or   aberrantly conducted complexes  Nonspecific ST abnormality  When compared with ECG of 26-SEP-2017 02:18,  MANUAL COMPARISON REQUIRED, DATA IS UNCONFIRMED         Cardiographics    Telemetry: a. Flutter, PVCs  ECG: A. flutter  Echocardiogram: EF 30% previously.

## 2017-09-26 NOTE — PROGRESS NOTES
Admission Medication Reconciliation:    Information obtained from: Patient, patient's medication list, Rx Query    Significant PMH/Disease States:   Past Medical History:   Diagnosis Date    CAD (coronary artery disease)     Heart failure ([facility] [address]     Hypertension        Chief Complaint for this Admission:    Chief Complaint   Patient presents with    Chest Pain    Shortness of Breath       Allergies:  Penicillins    Prior to Admission Medications:   Prior to Admission Medications   Prescriptions Last Dose Informant Patient Reported? Taking? apixaban (ELIQUIS) 2.5 mg tablet 2017 at 1730  Yes Yes   Sig: Take 2.5 mg by mouth two (2) times a day. atorvastatin (LIPITOR) 80 mg tablet 2017 at 1730  Yes Yes   Sig: Take 80 mg by mouth nightly. beclomethasone (QVAR) 80 mcg/actuation aero 2017 at Unknown time  Yes Yes   Sig: Take 2 Puffs by inhalation two (2) times daily as needed. bumetanide (BUMEX) 1 mg tablet 2017 at 1730  Yes Yes   Sig: Take 0.5 mg by mouth every evening. carvedilol (COREG) 3.125 mg tablet 2017 at 1730  Yes Yes   Sig: Take 6.25 mg by mouth two (2) times a day. Pt increased dose to two 3.125 mg tablets (=6.25 mg) BID last Tuesday, . nitroglycerin (NITROSTAT) 0.4 mg SL tablet   No Yes   Si Tab by SubLINGual route every five (5) minutes as needed for Chest Pain for up to 3 doses. Facility-Administered Medications: None         Comments/Recommendations:     Spoke with patient regarding PTA medications. Updated PTA medication list. Added Qvar - written for 2 puffs BID, however patient states he takes twice daily as needed. Modified bumetanide (changed from 1 mg daily to 0.5 mg daily) and carvedilol (changed from 3.125 mg BID to 6.25 mg BID). Last dose information listed in table above.       Leif Monk, PharmD

## 2017-09-26 NOTE — ED PROVIDER NOTES
HPI Comments: 80 y.o. male with past medical history significant for CAD s/p PCI, AFIB s/p external cardioversion to NSR on 8/12/2017, currently anticoagulated on Eliquis, s/p pacemaker placement ~10 years ago, CHF, HTN, who presents from home accompanied by spouse, with chief complaint of progressively worsening CP and SOB since yesterday 9/25/2017. Pt reports that his CP and SOB started yesterday at ~1200, and have been worsening since last night. The CP is located in the left side of his chest \"around my pacemaker\", and is constant and non-radiating. The pain is currently an 8/10 in severity and \"sharp\" in quality. He notes that movement of the left arm causes a \"pulling\" sensation in the left chest, and he denies any h/o similar pain. Pt additionally c/o nausea, wheezing, and intermittent lightheadedness associated with the CP. Pt reports that he does not feel lightheaded while in the ED at this time. He also reports that his legs have been more swollen over the past two days, and he states that he has been compliant with his diuretic (Bumex) as prescribed. He has had urinary frequency secondary to the diuretic, and he denies difficulty urinating. Pt denies any h/o asthma, but he reports that he uses a QVAR inhaler as needed because he has had wheezing in the past. Of note, pt reports that he had his pacemaker checked last week by an electrophysiologist, Dr. Kristi Erwin, and was started on a new medication (Carvedilol) at that time. He notes that he is still awaiting a phone call from Dr. Kristi Erwin regarding future plans for his pacemaker. Pt also reports that he is scheduled to follow-up with his regular cardiologist, Dr. William Plaza, on 10/5/2017. He specifically denies any fevers, chills, cough, congestion, diaphoresis, dizziness, vomiting, and HA. There are no other acute medical concerns at this time.     Social hx: - Tobacco, - EtOH, - Illicit Drug Abuse   PCP: Cary Wright MD  Cardiology: Dr. Jasen Quan Mark Winchester MD  Cardiology/Electrophysiology :Dr. Aster Fontaine. Odalys Chapman     Note written by Shirley Spencer, as dictated by Sudhir Polo MD 2:44 AM     The history is provided by the patient. No  was used. Admitted in July for chest pain, tachycardia. abnormal stress. Had cath as outpatient: moderate 50% proximal CX stenosis, severe stenosis of the very distal end of OM3, nonocclusive disease of the LAD, normal nondominant RCA, normal LV filling pressure    Seen August 11th  for cardioversion. Past Medical History:   Diagnosis Date    CAD (coronary artery disease)     Heart failure ([address]     Hypertension        Past Surgical History:   Procedure Laterality Date    HX ORTHOPAEDIC      bilateral hip replacement    HX PACEMAKER           History reviewed. No pertinent family history. Social History     Social History    Marital status:      Spouse name: N/A    Number of children: N/A    Years of education: N/A     Occupational History    Not on file. Social History Main Topics    Smoking status: Never Smoker    Smokeless tobacco: Never Used    Alcohol use No    Drug use: Not on file    Sexual activity: Not on file     Other Topics Concern    Not on file     Social History Narrative         ALLERGIES: Penicillins    Review of Systems   Constitutional: Negative for chills, diaphoresis and fever. HENT: Negative for congestion. Eyes: Negative. Respiratory: Positive for shortness of breath and wheezing. Negative for cough. Cardiovascular: Positive for chest pain and leg swelling. Gastrointestinal: Positive for nausea. Negative for vomiting. Genitourinary: Positive for frequency. Negative for difficulty urinating. Musculoskeletal: Negative. Skin: Negative. Neurological: Positive for light-headedness. Negative for dizziness and headaches. All other systems reviewed and are negative.       Vitals:    09/26/17 0224   Temp: 97.9 °F (36.6 °C)   Weight: 94 kg (207 lb 2 oz)   Height: 5' 8\" (1.727 m)            Physical Exam   Constitutional: He is oriented to person, place, and time. He appears well-developed and well-nourished. HENT:   Head: Normocephalic and atraumatic. Nose: Nose normal.   Eyes: Conjunctivae and EOM are normal. Pupils are equal, round, and reactive to light. Neck: Normal range of motion. Neck supple. Cardiovascular: Regular rhythm, normal heart sounds and intact distal pulses. Tachycardia present. Pulmonary/Chest: Effort normal. He has wheezes (end-expiratory wheezes throughout). Abdominal: Soft. Bowel sounds are normal.   Musculoskeletal: Normal range of motion. He exhibits edema (1-2+ BLE). Neurological: He is oriented to person, place, and time. He has normal reflexes. Skin: Skin is warm and dry. Psychiatric: He has a normal mood and affect. His behavior is normal.   Nursing note and vitals reviewed. Note written by Ardyth Jeans. Elda Weber, as dictated by Any Reveles MD 2:44 AM        MDM  Number of Diagnoses or Management Options  Critical Care  Total time providing critical care: 30-74 minutes  60 minutes  ED Course       Procedures    Spoke with Dr. Mirella Hancock- would like hospitalist to admit and they will see in morning. ED EKG interpretation: 2:18am  Rhythm:regular . tachycardia Rate (approx.): 130; Axis: normal; P wave: ; QRS interval: normal ; ST/T wave: non-specific changes; This EKG was interpreted by Any Reveles MD,ED Provider. Cardizem bolus given with slowing of rate. ED EKG interpretation:3:27  Rhythm: atrial flutter; and irregular. Rate (approx.): 74; Axis: left axis deviation; P wave: ; QRS interval: prolonged; ST/T wave: non-specific changes; . This EKG was interpreted by Any Reveles MD,ED Provider. Pain better with rate control. Rate increased with pain so cardizem gtt started. BNP elevated with wheeze and edema on exam. BUmex given. Troponin neg. cxr clear.

## 2017-09-26 NOTE — ED TRIAGE NOTES
I have been having pain right beside where my pacemaker is since yesterday. My heart beat has been fast for a week or so Dr. Ara Jimenez is my doctor and he wants me to have my pacemaker checked.

## 2017-09-26 NOTE — PROGRESS NOTES
9/26/2017     Admit Date: 9/26/2017    Admit Diagnosis: Chest pain    Active Problems:    Chest pain (7/2/2017)        Assessment/Plan:  1. Mr Caesar Koyanagi remains in atrial flutter with RVR and is very short of breath with minimal activity. He needs an atrial flutter ablation. Unfortunately, the EP schedule cannot accommodate Dr Lakisha Armas anytime this week. Plan:  1. PAMELA/CV to be done tomorrow at 12 noon. Mr Caesar Koyanagi has only been back on Eliquis for 1 week and will need PAMELA to assess for thrombus before proceeding with DCCV. He agrees to proceed. Discussed with his daughter, Lamin Rice, by phone. Subjective:  Short of breath       Perla Belknap. admits to chest pain, chest pressure/discomfort, palpitations, orthopnea, paroxysmal nocturnal dyspnea. Objective:     Visit Vitals    BP (!) 155/96 (BP 1 Location: Right arm, BP Patient Position: At rest)    Pulse 79    Temp 97.9 °F (36.6 °C)    Resp 20    Ht 5' 8\" (1.727 m)    Wt 94 kg (207 lb 2 oz)    SpO2 95%    BMI 31.49 kg/m2        Physical Exam:  Neck: JVD - 6 cm above sternal notch  Heart: irregularly irregular rhythm, S1, S2 normal, no rub  Lungs: normal percussion bilaterally, rhonchi R base, L base, wheezes R base, L base  Abdomen: soft, non-tender. Bowel sounds normal. No masses,  no organomegaly  Extremities: extremities normal, atraumatic, no cyanosis or edema  Pulses: 2+ and symmetric  Neurologic: Grossly normal      Labs:    Recent Labs      09/26/17   0912   TROIQ  <0.04     Recent Labs      09/26/17   0912   NA  139   K  4.0   CL  107   BUN  19   CREA  1.76*   GLU  105*   CA  8.7     Recent Labs      09/26/17   0256   WBC  9.9   HGB  10.4*   HCT  32.9*   PLT  233     No results for input(s): CHOL, LDLC in the last 72 hours.     No lab exists for component: TGL, HDLC,  HBA1C    Telemetry: atrial flutter with RVR     Data Review: current meds, labs,recent radiology, intake/output/weight and problem list reviewed

## 2017-09-26 NOTE — ED NOTES
0745: Report given to Shwetha Peters RN from [facility] using [employer]. Patient resting on stretcher with no further needs at this time. Awaiting admission orders. 0830: Pacemaker interrogated through [employer] and data sent. 0930: Patient changed over to  bed, awaiting bed placement.

## 2017-09-26 NOTE — PROGRESS NOTES
TRANSFER - OUT REPORT:    Verbal report given to Sendy RN on Excell Said.  being transferred to [facility] for routine progression of care       Report consisted of patients Situation, Background, Assessment and   Recommendations(SBAR). Information from the following report(s) SBAR and ED Summary was reviewed with the receiving nurse. Lines:   Peripheral IV 09/26/17 Left Forearm (Active)   Site Assessment Clean, dry, & intact 9/26/2017  3:21 AM   Phlebitis Assessment 0 9/26/2017  3:21 AM   Infiltration Assessment 0 9/26/2017  3:21 AM   Dressing Status Clean, dry, & intact 9/26/2017  3:21 AM        Opportunity for questions and clarification was provided.       Patient transported with:   Registered Nurse

## 2017-09-26 NOTE — ED NOTES
Re conducted interrogation of cardiac device to left upper chest. Following completion of data sent, [employer] called and informed data received and in approx 20 mins data will be faxed.

## 2017-09-26 NOTE — PROGRESS NOTES
Care Management-Patient to ED for chest pain. Patient with a history of recent cardiac cath in July and rapid heart rate in August. Patient currently in observation status and bed hold in the ED. Met with patient in the room. Discussed observation status, MOON letter, and State Observation Notice. Patient signed both. ED  scanned them into patient's chart. Patient asked questions about a previous bill that Medicare and his [address] did not cover. The bill was for $190. Encouraged patient to call the billing office to further inquire about the bill. Patient lives alone and is independent with his ADLs. He does use a walker to ambulate. There are 2 steps to get into his home and no steps inside the home. Patient confirmed his address and contact information. His emergency  is his daughter Fransisca Kaur ([phone]). Patient has had [facility] and most recently [facility] in the past. Patient confirmed his PCP is Dr. Shannan Warner. Care Management Interventions  PCP Verified by CM:  Yes (Dr. Shannan Warner)  Mode of Transport at Discharge:  (daughter)  Discharge Durable Medical Equipment: No  Physical Therapy Consult: No  Occupational Therapy Consult: No  Speech Therapy Consult: No  Current Support Network: Lives Alone  Confirm Follow Up Transport: Family (daughter Fransisca Kaur [phone])  Plan discussed with Pt/Family/Caregiver: Yes  Discharge Location  Discharge Placement: [facility], [facility]

## 2017-09-26 NOTE — H&P
[address] [place] [address], [address] [place] [address]   HISTORY AND PHYSICAL       Name:  Rao Plummer   MR#:  [id number]   :  1930   Account #:  [de-identified]        Date of Adm:  2017       CHIEF COMPLAINT: Chest pain. HISTORY OF PRESENT ILLNESS: The patient is an 80-year-old   patient with a past medical history of atrial fibrillation, coronary artery   disease, ischemic cardiomyopathy with an ejection fraction of 35% on   last echo in 2017. He has been admitted prior because of the chest   pain. The patient refers that he developed chest  pain located on the   left side of the chest, graded 8/10, sharp in characteristic. When the   patient touches himself in the area, he refers to have severe pain. He   did not refer any symptoms associated with the pain. When evaluated   in the ER, the patient was found also to be in atrial flutter/atrial   fibrillation with a rapid ventricular response, for which the patient was   placed on a Cardizem drip. By the time I went to evaluate the patient,   his heart rate was in the 80s and the patient's overall findings were   more subtle. ALLERGIES: PENICILLIN. PAST MEDICAL HISTORY    1. Atrial flutter/fibrillation. 2. Systolic congestive heart failure with an ejection fraction of 35% in   2017. 3. Hyperlipidemia. 4. Glaucoma. 5. Coronary artery disease. 6. Hypertension. MEDICATIONS    1. Apixaban 2.5 mg b.i.d.   2. Lipitor 80 mg daily. 3. Bumex 1 mg p.o. daily. 4. Coreg 3.125 b.i.d.   5. Nitroglycerin sublingual.     Compared with the prior admission that was on 2017, there are   a few medications missing. SURGICAL HISTORY/PROCEDURE HISTORY: Pacemaker   placement. Hip replacement and revision after a dislocation. Toxic   habits are negative. The patient denied any alcohol or tobacco use. FAMILY HISTORY: Both parents are dead. He is referred as a   consequence of heart disease.     SOCIAL HISTORY: The patient is retired, living  independently. He has   3 daughters who frequently goes around him. REVIEW OF SYSTEMS   CONSTITUTIONAL: No headache, no fever or weight loss. HEENT: Again denied headache, dizziness, blurry vision or difficulty   hearing. CARDIOPULMONARY: No shortness of breath. Just chest pain which   is present and I believe chronic at this time. GASTROINTESTINAL: Denied nausea, vomiting, or diarrhea. GENITOURINARY: Negative. PHYSICAL EXAMINATION   VITAL SIGNS: Blood pressure is 141/71, heart rate is 80, respiration is   16 and temperature 98.2. GENERAL APPEARANCE: This is an 80year-old who is lying   comfortable in the stretcher, still complaining of chest pain. This time is   less. He describes it now like a 4 or 5/10   HEENT: There is no deformity. The eyes with glaucoma look   hypertrophic and with proptosis. Extraocular movement intact. Pale   conjunctivae. Moist oral mucosa. NECK: Supple, no JVD, no bruits. THORAX: Symmetrical expansion without deformity with reproducible   pain on the left hemithorax, fourth to fifth intercostal space. CARDIOVASCULAR: Heart rate is 80 and regular. Unable to hear any   murmur. LUNGS: Clear to auscultation. No wheezing or crepitation heard. ABDOMEN: Soft, bowel sounds present. There is no organomegaly. EXTREMITIES: Symmetric with soft pitting edema present feeding   bilaterally. Pulses are present. CENTRAL NERVOUS SYSTEM: The patient is conscious, alert,   nonfocal. The patient is able to follow command. LABORATORY DATA: WBC count is 9.9 with H and H of 10.4 and   32.9, platelet count 731. Sodium is 139, potassium 4.5, chloride is 107,   CO2 is 24, BUN is 21, creatinine 1.8, and the glucose is 0.9. IMAGING STUDIES: Negative for any cardiovascular event. ASSESSMENT AND PLAN    1. Chest pain. Atypical in nature.  I do not believe that any   further assessment and planning could be done,  especially since the   patient, in July, was started again completely with no new event. Now,   that his troponin has been negative, no further study needed   from cardiovascular standpoint. No stress test should be done. The   patient is in atrial fibrillation/atrial flutter. At this time, the patient on   Cardizem with overall good control of his heart rate. They weighted be   awaited. This will be managed. He was started on Coreg on an   outpatient basis by his cardiology. His blood pressure would allow us   to  titrate the Coreg higher. He has seen by Cardiology, who will   continue treatment from that point of view. 2. Congestive heart failure. This is a chronic event in this patient, he   said,  probably could be multifactorial including no adherence to the   diet, compliance with the medication, even though the patient have a   BMP at 6466. The patient referred that he had good overall functional   capacity. From the point of view that he lives alone, as per the patient   and he does all of his daily living activities with no help. 3. Hyperlipidemia. Will continue the patient on his Lipitor. 4. Coronary artery disease. Again, no further treatment or intervention   as the patient had been worked up.          Vikki Babinski, MD ME / [facility]   D:  09/26/2017   08:08   T:  09/26/2017   09:06   Job #:  [id number]

## 2017-09-26 NOTE — ED NOTES
Pt rate increased to 90-102bpm. Pt now complaints of 6/10 CP. NAD. MD aware. Orders received for nitropaste.

## 2017-09-26 NOTE — IP AVS SNAPSHOT
[facility] [facility] [address] [place] [address] 
[phone] Patient: Jeanne Schwartz Sr. MRN: [id number] You are allergic to the following Allergen Reactions Penicillins Itching Recent Documentation Height Weight BMI Smoking Status 1.727 m 90.2 kg 30.24 kg/m2 Never Smoker Emergency Contacts Name Discharge Info Relation Home Work Mobile Marlo Lobato DISCHARGE CAREGIVER [3] Child [2] [phone] About your hospitalization You were admitted on:  September 26, 2017 You last received care in the:  [facility] 4 IMCU You were discharged on:  September 27, 2017 Unit phone number:  [phone] Why you were hospitalized Your primary diagnosis was:  Not on File Your diagnoses also included:  Chest Pain Providers Seen During Your Hospitalizations Provider Role Specialty Primary office phone Tee Mendoza MD Attending Provider Emergency Medicine [phone] Naina Sanchez MD Attending Provider Internal Medicine [phone] Ami Jimenez MD Attending Provider Internal Medicine [phone] Your Primary Care Physician (PCP) Primary Care Physician Office Phone Office Fax Crys Salgado [phone] Follow-up Information Follow up With Details Comments Contact Info Nora Cary MD  follow up this week Michelle Sanches [facility] [address] [facility] [address] 
[phone] Mica Reed MD   [address] [place] [address] 
[phone] Current Discharge Medication List  
  
START taking these medications Dose & Instructions Dispensing Information Comments Morning Noon Evening Bedtime  
 dilTIAZem 30 mg tablet Commonly known as:  CARDIZEM Your last dose was:     
   
Your next dose is:    
   
   
 Dose:  30 mg  
 Take 1 Tab by mouth four (4) times daily. Quantity:  60 Tab Refills:  1 CONTINUE these medications which have NOT CHANGED Dose & Instructions Dispensing Information Comments Morning Noon Evening Bedtime  
 bumetanide 1 mg tablet Commonly known as:  Crys Rocher Your last dose was: Your next dose is:    
   
   
 Dose:  0.5 mg Take 0.5 mg by mouth every evening. Refills:  0  
     
   
   
   
  
 carvedilol 3.125 mg tablet Commonly known as:  Deleta Lilliam Your last dose was: Your next dose is:    
   
   
 Dose:  6.25 mg Take 6.25 mg by mouth two (2) times a day. Pt increased dose to two 3.125 mg tablets (=6.25 mg) BID last Tuesday, 9/19. Refills:  0  
     
   
   
   
  
 ELIQUIS 2.5 mg tablet Generic drug:  apixaban Your last dose was: Your next dose is:    
   
   
 Dose:  2.5 mg Take 2.5 mg by mouth two (2) times a day. Refills:  0 LIPITOR 80 mg tablet Generic drug:  atorvastatin Your last dose was: Your next dose is:    
   
   
 Dose:  80 mg Take 80 mg by mouth nightly. Refills:  0  
     
   
   
   
  
 nitroglycerin 0.4 mg SL tablet Commonly known as:  NITROSTAT Your last dose was: Your next dose is:    
   
   
 Dose:  0.4 mg  
1 Tab by SubLINGual route every five (5) minutes as needed for Chest Pain for up to 3 doses. Quantity:  1 Bottle Refills:  3 QVAR 80 mcg/actuation [employer] Generic drug:  beclomethasone Your last dose was: Your next dose is:    
   
   
 Dose:  2 Puff Take 2 Puffs by inhalation two (2) times daily as needed. Refills:  0 Where to Get Your Medications Information on where to get these meds will be given to you by the nurse or doctor. ! Ask your nurse or doctor about these medications  
  dilTIAZem 30 mg tablet Discharge Instructions Discharge Instructions PATIENT ID: Barrie Bro Sr. MRN: [id number] YOB: 1930 DATE OF ADMISSION: 9/26/2017  2:23 AM   
DATE OF DISCHARGE: 9/27/2017 PRIMARY CARE PROVIDER: Danica Packer MD  
 
ATTENDING PHYSICIAN: Mayte Hunt MD 
DISCHARGING PROVIDER: Ian Johnson NP To contact this individual call [phone] and ask the  to page. If unavailable ask to be transferred the Adult Hospitalist Department. DISCHARGE DIAGNOSES: Recurrent A-flutter CONSULTATIONS: IP CONSULT TO CARDIOLOGY 
IP CONSULT TO CARDIOLOGY 
IP CONSULT TO HOSPITALIST 
 
PROCEDURES/SURGERIES: * No surgery found * PENDING TEST RESULTS:  
At the time of discharge the following test results are still pending: none FOLLOW UP APPOINTMENTS:  
Follow-up Information Follow up With Details Comments Contact Info Vi Santana MD  follow up this week Artist Winston Sanches [address] [facility] [address] [place] [address] Avenue 
[phone] ADDITIONAL CARE RECOMMENDATIONS:  
Diltiazem 30 mg IR four times a day - new med Dr. Patricia Orr will follow up with you this week for an ablation. Call Dr. Marinda Osler office,let them know that Dr. Shari Tejada performed a cardioversion for atrial flutter and you need to be set up for an ablation with Dr. Omar Pastor. Dr. Mimi Degroot has spoken with Dr. Patricia Orr on this matter. DIET: Cardiac ACTIVITY: as tolerated WOUND CARE: none EQUIPMENT needed: none DISCHARGE MEDICATIONS: 
 See Medication Reconciliation Form · It is important that you take the medication exactly as they are prescribed. · Keep your medication in the bottles provided by the pharmacist and keep a list of the medication names, dosages, and times to be taken in your wallet. · Do not take other medications without consulting your doctor.   
 
 
NOTIFY YOUR PHYSICIAN FOR ANY OF THE FOLLOWING:  
 Fever over 101 degrees for 24 hours. Chest pain, shortness of breath, fever, chills, nausea, vomiting, diarrhea, change in mentation, falling, weakness, bleeding. Severe pain or pain not relieved by medications. Or, any other signs or symptoms that you may have questions about. DISPOSITION: 
xx  Home With: 
 OT  PT  [facility]  RN  
  
 SNF/Inpatient Rehab/LTAC Independent/assisted living Hospice Other: CDMP Checked:  
Yes xx PROBLEM LIST Updated: 
Yes xx Signed:  
Marquis Christie NP 
9/27/2017 
2:34 PM 
 
Diltiazem (By mouth) Diltiazem ([id number]) Treats high blood pressure and angina (chest pain). This medicine is a calcium channel blocker. Brand Name(s): Cardizem, Cardizem CD, Cardizem LA, Cartia XT, Dilt-XR, Matzim LA, [place], [address],Hank B There may be other brand names for this medicine. When This Medicine Should Not Be Used: This medicine is not right for everyone. Do not use it if you had an allergic reaction to diltiazem or similar medicines. How to Use This Medicine:  
Long Acting Capsule, 12 Hour Capsule, 24 Hour Capsule, Tablet, Long Acting Tablet · Take your medicine as directed. Your dose may need to be changed several times to find what works best for you. · It is best to take this medicine on an empty stomach. · Swallow this medicine whole. Do not crush, break, chew, or open the capsule or tablet. · Missed dose: Take a dose as soon as you remember. If it is almost time for your next dose, wait until then and take a regular dose. Do not take extra medicine to make up for a missed dose. · Store the medicine in a closed container at room temperature, away from heat, moisture, and direct light. Drugs and Foods to Avoid: Ask your doctor or pharmacist before using any other medicine, including over-the-counter medicines, vitamins, and herbal products. · Some medicines can affect how diltiazem works. Tell your doctor if you are using the following: ¨ Buspirone, carbamazepine, cimetidine, clonidine, cyclosporine, digoxin, ivabradine, lovastatin, midazolam, quinidine, rifampin, simvastatin, triazolam 
¨ Other blood pressure medicine, including a beta-blocker Hetal Hagan Warnings While Using This Medicine: · Tell your doctor if you are pregnant or breastfeeding, or if you have kidney disease, liver disease, or digestion problems. Tell your doctor about all heart problems that you have, including heart failure or rhythm problems. · This medicine may cause the following problems: ¨ Slow heartbeat ¨ Worsening of heart failure ¨ Serious skin reactions · This medicine could lower your blood pressure too much, especially when you first use it or if you are dehydrated. Stand or sit up slowly if you feel lightheaded or dizzy. Do not drive or do anything else that could be dangerous until you know how this medicine affects you. · Do not stop using this medicine without asking your doctor, even if you feel well. This medicine will not cure high blood pressure, but it will help keep it in normal range. You may have to take blood pressure medicine for the rest of your life. · Your doctor will do lab tests at regular visits to check on the effects of this medicine. Keep all appointments. · Keep all medicine out of the reach of children. Never share your medicine with anyone. Possible Side Effects While Using This Medicine:  
Call your doctor right away if you notice any of these side effects: · Allergic reaction: Itching or hives, swelling in your face or hands, swelling or tingling in your mouth or throat, chest tightness, trouble breathing · Blistering, peeling, red skin rash · Dark urine or pale stools, nausea, vomiting, loss of appetite, stomach pain, yellow skin or eyes · Fast, slow, uneven, or pounding heartbeat · Rapid weight gain, swelling in your hands, ankles, or feet If you notice other side effects that you think are caused by this medicine, tell your doctor. Call your doctor for medical advice about side effects. You may report side effects to FDA at 8-191-FDA-1551 © 2017 [facility] Information is for End User's use only and may not be sold, redistributed or otherwise used for commercial purposes. The above information is an  only. It is not intended as medical advice for individual conditions or treatments. Talk to your doctor, nurse or pharmacist before following any medical regimen to see if it is safe and effective for you. Discharge Orders None [employer] Announcement We are excited to announce that we are making your provider's discharge notes available to you in [employer]. You will see these notes when they are completed and signed by the physician that discharged you from your recent hospital stay. If you have any questions or concerns about any information you see in [employer], please call the Health Information Department where you were seen or reach out to your Primary Care Provider for more information about your plan of care. Introducing [facility]! [employer] introduces [employer] patient portal. Now you can access parts of your medical record, email your doctor's office, and request medication refills online. 1. In your internet browser, go to [email or web address] 2. Click on the First Time User? Click Here link in the Sign In box. You will see the New Member Sign Up page. 3. Enter your [employer] Access Code exactly as it appears below. You will not need to use this code after youve completed the sign-up process. If you do not sign up before the expiration date, you must request a new code. · [employer] Access Code: [email or web address] Expires: 12/24/2017  4:30 PM 
 
4. Enter the last four digits of your Social Security Number (xxxx) and Date of Birth (mm/dd/yyyy) as indicated and click Submit. You will be taken to the next sign-up page. 5. Create a [employer] ID. This will be your [employer] login ID and cannot be changed, so think of one that is secure and easy to remember. 6. Create a [employer] password. You can change your password at any time. 7. Enter your Password Reset Question and Answer. This can be used at a later time if you forget your password. 8. Enter your e-mail address. You will receive e-mail notification when new information is available in [address] 9. Click Sign Up. You can now view and download portions of your medical record. 10. Click the Download Summary menu link to download a portable copy of your medical information. If you have questions, please visit the Frequently Asked Questions section of the [employer] website. Remember, [employer] is NOT to be used for urgent needs. For medical emergencies, dial 911. Now available from your iPhone and Android! General Information Please provide this summary of care documentation to your next provider. Patient Signature:  ____________________________________________________________ Date:  ____________________________________________________________  
  
Giana Snyder Provider Signature:  ____________________________________________________________ Date:  ____________________________________________________________

## 2017-09-27 ENCOUNTER — ANESTHESIA (OUTPATIENT)
Dept: CARDIAC CATH/INVASIVE PROCEDURES | Age: 82
End: 2017-09-27
Payer: MEDICARE

## 2017-09-27 ENCOUNTER — ANESTHESIA EVENT (OUTPATIENT)
Dept: CARDIAC CATH/INVASIVE PROCEDURES | Age: 82
End: 2017-09-27
Payer: MEDICARE

## 2017-09-27 VITALS
DIASTOLIC BLOOD PRESSURE: 86 MMHG | HEIGHT: 68 IN | BODY MASS INDEX: 30.14 KG/M2 | RESPIRATION RATE: 14 BRPM | SYSTOLIC BLOOD PRESSURE: 154 MMHG | TEMPERATURE: 96.3 F | WEIGHT: 198.85 LBS | OXYGEN SATURATION: 94 % | HEART RATE: 82 BPM

## 2017-09-27 PROBLEM — R07.9 CHEST PAIN: Status: RESOLVED | Noted: 2017-07-02 | Resolved: 2017-09-27

## 2017-09-27 LAB
ANION GAP SERPL CALC-SCNC: 10 MMOL/L (ref 5–15)
ATRIAL RATE: 82 BPM
BUN SERPL-MCNC: 20 MG/DL (ref 6–20)
BUN/CREAT SERPL: 12 (ref 12–20)
CALCIUM SERPL-MCNC: 8.8 MG/DL (ref 8.5–10.1)
CALCULATED R AXIS, ECG10: 0 DEGREES
CALCULATED T AXIS, ECG11: -4 DEGREES
CHLORIDE SERPL-SCNC: 106 MMOL/L (ref 97–108)
CO2 SERPL-SCNC: 25 MMOL/L (ref 21–32)
CREAT SERPL-MCNC: 1.63 MG/DL (ref 0.7–1.3)
DIAGNOSIS, 93000: NORMAL
DIGOXIN SERPL-MCNC: <0.2 NG/ML (ref 0.9–2)
GLUCOSE SERPL-MCNC: 110 MG/DL (ref 65–100)
MAGNESIUM SERPL-MCNC: 2.1 MG/DL (ref 1.6–2.4)
POTASSIUM SERPL-SCNC: 3.7 MMOL/L (ref 3.5–5.1)
Q-T INTERVAL, ECG07: 476 MS
QRS DURATION, ECG06: 108 MS
QTC CALCULATION (BEZET), ECG08: 538 MS
SODIUM SERPL-SCNC: 141 MMOL/L (ref 136–145)
VENTRICULAR RATE, ECG03: 77 BPM

## 2017-09-27 PROCEDURE — 74011000258 HC RX REV CODE- 258

## 2017-09-27 PROCEDURE — 92960 CARDIOVERSION ELECTRIC EXT: CPT

## 2017-09-27 PROCEDURE — 74011000250 HC RX REV CODE- 250: Performed by: INTERNAL MEDICINE

## 2017-09-27 PROCEDURE — 80162 ASSAY OF DIGOXIN TOTAL: CPT | Performed by: INTERNAL MEDICINE

## 2017-09-27 PROCEDURE — 80048 BASIC METABOLIC PNL TOTAL CA: CPT | Performed by: INTERNAL MEDICINE

## 2017-09-27 PROCEDURE — 77030018729 HC ELECTRD DEFIB PAD CARD -B

## 2017-09-27 PROCEDURE — 74011250637 HC RX REV CODE- 250/637: Performed by: INTERNAL MEDICINE

## 2017-09-27 PROCEDURE — 93312 ECHO TRANSESOPHAGEAL: CPT

## 2017-09-27 PROCEDURE — 74011250636 HC RX REV CODE- 250/636

## 2017-09-27 PROCEDURE — 83735 ASSAY OF MAGNESIUM: CPT | Performed by: INTERNAL MEDICINE

## 2017-09-27 PROCEDURE — 74011250636 HC RX REV CODE- 250/636: Performed by: INTERNAL MEDICINE

## 2017-09-27 PROCEDURE — 93005 ELECTROCARDIOGRAM TRACING: CPT

## 2017-09-27 PROCEDURE — 36600 WITHDRAWAL OF ARTERIAL BLOOD: CPT

## 2017-09-27 PROCEDURE — 99218 HC RM OBSERVATION: CPT

## 2017-09-27 PROCEDURE — 36415 COLL VENOUS BLD VENIPUNCTURE: CPT | Performed by: INTERNAL MEDICINE

## 2017-09-27 RX ORDER — SODIUM CHLORIDE 0.9 % (FLUSH) 0.9 %
10 SYRINGE (ML) INJECTION AS NEEDED
Status: DISCONTINUED | OUTPATIENT
Start: 2017-09-27 | End: 2017-09-27

## 2017-09-27 RX ORDER — MIDAZOLAM HYDROCHLORIDE 1 MG/ML
.5-1 INJECTION, SOLUTION INTRAMUSCULAR; INTRAVENOUS
Status: DISCONTINUED | OUTPATIENT
Start: 2017-09-27 | End: 2017-09-27

## 2017-09-27 RX ORDER — SODIUM CHLORIDE 9 MG/ML
INJECTION, SOLUTION INTRAVENOUS
Status: DISCONTINUED | OUTPATIENT
Start: 2017-09-27 | End: 2017-09-27 | Stop reason: HOSPADM

## 2017-09-27 RX ORDER — DILTIAZEM HYDROCHLORIDE 30 MG/1
30 TABLET, FILM COATED ORAL 4 TIMES DAILY
Qty: 60 TAB | Refills: 1 | Status: SHIPPED | OUTPATIENT
Start: 2017-09-27 | End: 2018-07-11

## 2017-09-27 RX ORDER — FENTANYL CITRATE 50 UG/ML
25-200 INJECTION, SOLUTION INTRAMUSCULAR; INTRAVENOUS
Status: DISCONTINUED | OUTPATIENT
Start: 2017-09-27 | End: 2017-09-27

## 2017-09-27 RX ORDER — PROPOFOL 10 MG/ML
INJECTION, EMULSION INTRAVENOUS AS NEEDED
Status: DISCONTINUED | OUTPATIENT
Start: 2017-09-27 | End: 2017-09-27 | Stop reason: HOSPADM

## 2017-09-27 RX ORDER — ATROPINE SULFATE 0.1 MG/ML
1 INJECTION INTRAVENOUS AS NEEDED
Status: DISCONTINUED | OUTPATIENT
Start: 2017-09-27 | End: 2017-09-27

## 2017-09-27 RX ADMIN — BUMETANIDE 1 MG: 0.25 INJECTION INTRAMUSCULAR; INTRAVENOUS at 09:03

## 2017-09-27 RX ADMIN — APIXABAN 2.5 MG: 2.5 TABLET, FILM COATED ORAL at 09:03

## 2017-09-27 RX ADMIN — Medication 10 ML: at 14:00

## 2017-09-27 RX ADMIN — MIDAZOLAM HYDROCHLORIDE 1 MG: 1 INJECTION, SOLUTION INTRAMUSCULAR; INTRAVENOUS at 11:58

## 2017-09-27 RX ADMIN — CARVEDILOL 6.25 MG: 6.25 TABLET, FILM COATED ORAL at 09:03

## 2017-09-27 RX ADMIN — PROPOFOL 30 MG: 10 INJECTION, EMULSION INTRAVENOUS at 12:12

## 2017-09-27 RX ADMIN — MIDAZOLAM HYDROCHLORIDE 2 MG: 1 INJECTION, SOLUTION INTRAMUSCULAR; INTRAVENOUS at 11:54

## 2017-09-27 RX ADMIN — Medication 10 ML: at 06:00

## 2017-09-27 RX ADMIN — FENTANYL CITRATE 50 MCG: 50 INJECTION, SOLUTION INTRAMUSCULAR; INTRAVENOUS at 11:54

## 2017-09-27 RX ADMIN — PROPOFOL 20 MG: 10 INJECTION, EMULSION INTRAVENOUS at 12:11

## 2017-09-27 RX ADMIN — MIDAZOLAM HYDROCHLORIDE 1 MG: 1 INJECTION, SOLUTION INTRAMUSCULAR; INTRAVENOUS at 11:56

## 2017-09-27 RX ADMIN — SODIUM CHLORIDE: 9 INJECTION, SOLUTION INTRAVENOUS at 12:10

## 2017-09-27 NOTE — PROGRESS NOTES
Bedside shift change report given to Anderson Santos RN (oncoming nurse) by April RN (offgoing nurse). Report included the following information SBAR.

## 2017-09-27 NOTE — PROCEDURES
Cardiac Catheterization Procedure Note   Patient: Gallito Márquez Sr. MRN: [id number]  SSN: [id number]   YOB: 1930 Age: 80 y.o.   Sex: male    Date of Procedure: 9/27/2017   Pre-procedure Diagnosis: Atrial Fibrillation/Atrial Flutter  Post-procedure Diagnosis: sinus rhythm  Procedure: PAMELA and Cardioversion  :  Dr. Ariela Byrne MD    Assistant(s):  None  Anesthesia: Moderate Sedation   Estimated Blood Loss: Less than 10 mL   Specimens Removed: None  Findings: PAMELA was done prior to the CV; no clots noted; CV to sinus rhythm with 200 joules of biphasic energy x 1; well tolerated  Complications: None   Implants:  None  Signed by:  Ariela Byrne MD  9/27/2017  12:33 PM

## 2017-09-27 NOTE — PROGRESS NOTES
Cardiac Cath Lab Procedure Area Arrival Note:    Brooklyn Abarca. arrived to Cardiac Cath Lab, Procedure Area. Patient identifiers verified with NAME and DATE OF BIRTH. Procedure verified with patient. Consent forms verified. Allergies verified. Patient informed of procedure and plan of care. Questions answered with review. Patient voiced understanding of procedure and plan of care. Patient on cardiac monitor, non-invasive blood pressure, SPO2 monitor. On room air or O2 @ 2 lpm via nasal cannula. IV of normal saline on pump at 25 ml/hr. Patient status doing well without problems. Patient is A&Ox 4. Patient reports no pain. Patient medicated during procedure with orders obtained and verified by Dr. Aminah Beth. Refer to patients Cardiac Cath Lab PROCEDURE REPORT for vital signs, assessment, status, and response during procedure, printed at end of case. Printed report on chart or scanned into chart.

## 2017-09-27 NOTE — ANESTHESIA PREPROCEDURE EVALUATION
Anesthetic History   No history of anesthetic complications            Review of Systems / Medical History  Patient summary reviewed, nursing notes reviewed and pertinent labs reviewed    Pulmonary  Within defined limits                 Neuro/Psych   Within defined limits           Cardiovascular    Hypertension: well controlled      CHF: NYHA Classification II  Dysrhythmias : atrial fibrillation  CAD    Exercise tolerance: >4 METS     GI/Hepatic/Renal         Renal disease: CRI       Endo/Other  Within defined limits           Other Findings   Comments: OS prosthesis         Physical Exam    Airway  Mallampati: II  TM Distance: > 6 cm  Neck ROM: normal range of motion   Mouth opening: Normal     Cardiovascular    Rhythm: irregular      Murmur, Mitral area     Dental    Dentition: Edentulous, Full lower dentures and Full upper dentures     Pulmonary  Breath sounds clear to auscultation               Abdominal  GI exam deferred       Other Findings            Anesthetic Plan    ASA: 3  Anesthesia type: general          Induction: Intravenous  Anesthetic plan and risks discussed with: Patient and Spouse

## 2017-09-27 NOTE — DISCHARGE INSTRUCTIONS
Discharge Instructions       PATIENT ID: Meaghan Scott Sr. MRN: [id number]   YOB: 1930    DATE OF ADMISSION: 9/26/2017  2:23 AM    DATE OF DISCHARGE: 9/27/2017    PRIMARY CARE PROVIDER: Gissel Fairbanks MD     ATTENDING PHYSICIAN: Colleen Alas MD  DISCHARGING PROVIDER: Nik Prado NP    To contact this individual call [phone] and ask the  to page. If unavailable ask to be transferred the Adult Hospitalist Department. DISCHARGE DIAGNOSES: Recurrent A-flutter    CONSULTATIONS: IP CONSULT TO CARDIOLOGY  IP CONSULT TO CARDIOLOGY  IP CONSULT TO HOSPITALIST    PROCEDURES/SURGERIES: * No surgery found *    PENDING TEST RESULTS:   At the time of discharge the following test results are still pending: none    FOLLOW UP APPOINTMENTS:   Follow-up Information     Follow up With Details Comments Contact Info    Isi Beckman MD  follow up this week Barrett Mckinney [address] [facility]  [phone]             ADDITIONAL CARE RECOMMENDATIONS:   Diltiazem 30 mg IR four times a day - new med    Dr. Christopher Louie will follow up with you this week for an ablation. Call Dr. Gus Santos office,let them know that Dr. Rani Hayes performed a cardioversion for atrial flutter and you need to be set up for an ablation with Dr. Nadya Arroyo. Dr. Poornima Wild has spoken with Dr. Christopher Louie on this matter. DIET: Cardiac    ACTIVITY: as tolerated    WOUND CARE: none    EQUIPMENT needed: none      DISCHARGE MEDICATIONS:   See Medication Reconciliation Form    · It is important that you take the medication exactly as they are prescribed. · Keep your medication in the bottles provided by the pharmacist and keep a list of the medication names, dosages, and times to be taken in your wallet. · Do not take other medications without consulting your doctor. NOTIFY YOUR PHYSICIAN FOR ANY OF THE FOLLOWING:   Fever over 101 degrees for 24 hours. Chest pain, shortness of breath, fever, chills, nausea, vomiting, diarrhea, change in mentation, falling, weakness, bleeding. Severe pain or pain not relieved by medications. Or, any other signs or symptoms that you may have questions about. DISPOSITION:  xx  Home With:   OT  PT  HH  RN       SNF/Inpatient Rehab/LTAC    Independent/assisted living    Hospice    Other:     CDMP Checked:   Yes xx     PROBLEM LIST Updated:  Yes xx       Signed:   Tosha Mendez V, NP  9/27/2017  2:34 PM    Diltiazem (By mouth)   Diltiazem ([id number])  Treats high blood pressure and angina (chest pain). This medicine is a calcium channel blocker. Brand Name(s): Cardizem, Cardizem CD, Cardizem LA, Cartia XT, Dilt-XR, Matzim LA, Taztia XT, Tiazac   There may be other brand names for this medicine. When This Medicine Should Not Be Used: This medicine is not right for everyone. Do not use it if you had an allergic reaction to diltiazem or similar medicines. How to Use This Medicine:   Long Acting Capsule, 12 Hour Capsule, 24 Hour Capsule, Tablet, Long Acting Tablet  · Take your medicine as directed. Your dose may need to be changed several times to find what works best for you. · It is best to take this medicine on an empty stomach. · Swallow this medicine whole. Do not crush, break, chew, or open the capsule or tablet. · Missed dose: Take a dose as soon as you remember. If it is almost time for your next dose, wait until then and take a regular dose. Do not take extra medicine to make up for a missed dose. · Store the medicine in a closed container at room temperature, away from heat, moisture, and direct light. Drugs and Foods to Avoid:   Ask your doctor or pharmacist before using any other medicine, including over-the-counter medicines, vitamins, and herbal products. · Some medicines can affect how diltiazem works.  Tell your doctor if you are using the following:  ¨ Buspirone, carbamazepine, cimetidine, clonidine, cyclosporine, digoxin, ivabradine, lovastatin, midazolam, quinidine, rifampin, simvastatin, triazolam  ¨ Other blood pressure medicine, including a beta-blocker  . Warnings While Using This Medicine:   · Tell your doctor if you are pregnant or breastfeeding, or if you have kidney disease, liver disease, or digestion problems. Tell your doctor about all heart problems that you have, including heart failure or rhythm problems. · This medicine may cause the following problems:  ¨ Slow heartbeat  ¨ Worsening of heart failure  ¨ Serious skin reactions  · This medicine could lower your blood pressure too much, especially when you first use it or if you are dehydrated. Stand or sit up slowly if you feel lightheaded or dizzy. Do not drive or do anything else that could be dangerous until you know how this medicine affects you. · Do not stop using this medicine without asking your doctor, even if you feel well. This medicine will not cure high blood pressure, but it will help keep it in normal range. You may have to take blood pressure medicine for the rest of your life. · Your doctor will do lab tests at regular visits to check on the effects of this medicine. Keep all appointments. · Keep all medicine out of the reach of children. Never share your medicine with anyone. Possible Side Effects While Using This Medicine:   Call your doctor right away if you notice any of these side effects:  · Allergic reaction: Itching or hives, swelling in your face or hands, swelling or tingling in your mouth or throat, chest tightness, trouble breathing  · Blistering, peeling, red skin rash  · Dark urine or pale stools, nausea, vomiting, loss of appetite, stomach pain, yellow skin or eyes  · Fast, slow, uneven, or pounding heartbeat  · Rapid weight gain, swelling in your hands, ankles, or feet  If you notice other side effects that you think are caused by this medicine, tell your doctor.    Call your doctor for medical advice about side effects. You may report side effects to FDA at 4-043-FDA-4780  © 2017 [address]  Information is for End User's use only and may not be sold, redistributed or otherwise used for commercial purposes. The above information is an  only. It is not intended as medical advice for individual conditions or treatments. Talk to your doctor, nurse or pharmacist before following any medical regimen to see if it is safe and effective for you.

## 2017-09-27 NOTE — PROGRESS NOTES
Hospitalist Progress Note  Randall Jaeger NP  Office: [phone]  Cell: [phone]      Date of Service:  2017  NAME:  Sophy Crespo.  :  1930  MRN:  [id number]      Admission Summary:   Mr Marquis Ulloa is an 71-year-old male with PMHx of a-fib, cad, ICOM w/ an ef of 35% on last echo on 2017 who presented to the ED on  with sharp left sided chest pain, 8/10, reproduceable with palpation and orthopnea. Pt was found to be a-flutter RVR, placed on cardizem drip and admitted for further work up.      Interval history / Subjective:   PAMELA/CV today, anticoagulated with eliquis x 1 week  HR controlled with diltiazem drip  Denies cp/sob this am  Possible d/c later today     Assessment & Plan:     Atypica Chest pain (POA) - Resolved  - recent cath was stable  - troponin negative x 2  - does not appear cardiac in nature, cardiology evaluated    Recurrent A-Flutter with RVR (POA)  - started on cardizem drip on admission  - c/w coreg, eliquis  - cardiology following  - pt had a DCCV on  that was successful after 1 shock  - pt ultimately needs an a-flutter ablation, however, this cannot be scheduled this week for Dr. Jason Ferrell  - PAMELA/CV today    Chronic Systolic CHF - compensated  - s/p pm placement  - bnp [id number] on admission  - c/w coreg, not on an ace-I likely 2/2 ckd    Hyperlipidemia  - c/w home Lipitor    Coronary artery disease  - cardiac cath on 17:  moderate 50% proximal CX stenosis, severe stenosis of the very distal end of OM3, nonocclusive disease of the LAD, normal nondominant RCA, normal LV filling pressure    CKD stage III - stable    Code status: Full  DVT prophylaxis: no need, on apixaban  Care Plan discussed with: patient, attending MD  Disposition: TBD, lives alone     Hospital Problems  Date Reviewed: 2017          Codes Class Noted POA    Chest pain ICD-10-CM: R07.9  ICD-9-CM: 786.50  2017 Unknown Review of Systems:   Denies cp/sob today. Although, pt reported he gets intermittent \"burning\" to left side of chest for \"some time. \"  Denies abd pain n/v  Denies dizziness  Denies urinary sx's  Denies any focal neuro sx's    Vital Signs:    Last 24hrs VS reviewed since prior progress note. Most recent are:  Visit Vitals    /79 (BP 1 Location: Right arm, BP Patient Position: At rest)    Pulse 75    Temp 97.5 °F (36.4 °C)    Resp 18    Ht 5' 8\" (1.727 m)    Wt 90.2 kg (198 lb 13.7 oz)    SpO2 98%    BMI 30.24 kg/m2         Intake/Output Summary (Last 24 hours) at 09/27/17 0650  Last data filed at 09/26/17 1200   Gross per 24 hour   Intake                0 ml   Output             1525 ml   Net            -1525 ml        Physical Examination:             Constitutional:  No acute distress, cooperative, pleasant    ENT:  Oral mucous moist, oropharynx benign. Neck supple. Left eye blindness   Resp:  CTA bilaterally. No wheezing/rhonchi/rales. No accessory muscle use   CV:  irregularly irregular rhythm, normal rate, no murmurs, gallops, rubs    GI:  Soft, non distended, non tender. normoactive bowel sounds, no hepatosplenomegaly     Musculoskeletal:  No edema, warm, 2+ pulses throughout    Neurologic:  Moves all extremities. AAOx3, CN II-XII reviewed     Psych:  Good insight, Not anxious nor agitated.                              Skin:  Good turgor, no rashes or ulcers       Data Review:    Review and/or order of clinical lab test  Review and/or order of tests in the radiology section of CPT  Review and/or order of tests in the medicine section of CPT      Labs:     Recent Labs      09/26/17 0256   WBC  9.9   HGB  10.4*   HCT  32.9*   PLT  233     Recent Labs      09/26/17   0912  09/26/17 0256   NA  139  139   K  4.0  4.5   CL  107  107   CO2  26  24   BUN  19  21*   CREA  1.76*  1.83*   GLU  105*  109*   CA  8.7  8.4*     Recent Labs      09/26/17 0256   SGOT  25   ALT  20   AP  112   TBILI 0.5   TP  7.3   ALB  2.9*   GLOB  4.4*     No results for input(s): INR, PTP, APTT in the last 72 hours. No lab exists for component: INREXT   No results for input(s): FE, TIBC, PSAT, FERR in the last 72 hours. No results found for: FOL, RBCF   No results for input(s): PH, PCO2, PO2 in the last 72 hours.   Recent Labs      09/26/17   0912 09/26/17   0256   TROIQ  <0.04  <0.04     No results found for: CHOL, CHOLX, CHLST, CHOLV, HDL, LDL, LDLC, DLDLP, TGLX, TRIGL, TRIGP, CHHD, CHHDX  No results found for: [facility]  Lab Results   Component Value Date/Time    Color YELLOW/STRAW 07/10/2017 08:01 AM    Appearance CLEAR 07/10/2017 08:01 AM    Specific gravity 1.019 07/10/2017 08:01 AM    pH (UA) 7.0 07/10/2017 08:01 AM    Protein NEGATIVE  07/10/2017 08:01 AM    Glucose NEGATIVE  07/10/2017 08:01 AM    Ketone NEGATIVE  07/10/2017 08:01 AM    Bilirubin NEGATIVE  07/10/2017 08:01 AM    Urobilinogen 1.0 07/10/2017 08:01 AM    Nitrites NEGATIVE  07/10/2017 08:01 AM    Leukocyte Esterase NEGATIVE  07/10/2017 08:01 AM    Epithelial cells 0-5 06/01/2011 05:45 AM    Bacteria NEGATIVE  06/01/2011 05:45 AM    WBC 0-4 06/01/2011 05:45 AM    RBC 3-5 06/01/2011 05:45 AM         Medications Reviewed:     Current Facility-Administered Medications   Medication Dose Route Frequency    apixaban (ELIQUIS) tablet 2.5 mg  2.5 mg Oral BID    atorvastatin (LIPITOR) tablet 80 mg  80 mg Oral QHS    carvedilol (COREG) tablet 6.25 mg  6.25 mg Oral BID    nitroglycerin (NITROSTAT) tablet 0.4 mg  0.4 mg SubLINGual Q5MIN PRN    sodium chloride (NS) flush 5-10 mL  5-10 mL IntraVENous Q8H    sodium chloride (NS) flush 5-10 mL  5-10 mL IntraVENous PRN    acetaminophen (TYLENOL) tablet 650 mg  650 mg Oral Q4H PRN    naloxone (NARCAN) injection 0.4 mg  0.4 mg IntraVENous PRN    ondansetron (ZOFRAN) injection 4 mg  4 mg IntraVENous Q4H PRN    magnesium hydroxide (MILK OF MAGNESIA) 400 mg/5 mL oral suspension 30 mL  30 mL Oral DAILY PRN    bumetanide (BUMEX) injection 1 mg  1 mg IntraVENous DAILY    dilTIAZem (CARDIZEM) 125 mg in dextrose 5% 125 mL infusion  5 mg/hr IntraVENous CONTINUOUS     ______________________________________________________________________  EXPECTED LENGTH OF STAY: - - -  ACTUAL LENGTH OF STAY:          0                 Beth Mendez V, NP

## 2017-09-27 NOTE — PROGRESS NOTES
TRANSFER - IN REPORT:    Verbal report received RAJANI Villegas from [place] [address] on [address], Procedure PAMELA\CV , from the Cardiac Cath lab, for routine progression of care. Report consisted of patients Situation, Background, Assessment and Recommendations(SBAR). Information from the following report(s) Procedure Summary, MAR and Recent Results was reviewed with the receiving clinician. Opportunity for questions and clarification was provided. Assessment completed upon patients arrival to [address] [place] [address] and care assumed. Cardiac Cath Lab Recovery Arrival Note:     Frankey Hooks Sr. arrived to [address]. Patient procedure= PAMELA\CV. Patient on cardiac monitor, non-invasive blood pressure, Patient status doing well without problems. Patient is A&Ox 4. Patient reports no pain, no chest pain, no n/v. Procedure site without any bleeding.

## 2017-09-27 NOTE — PROGRESS NOTES
I have reviewed discharge instructions with the patient and daughter. The patient and daughter verbalized understanding. Opportunity for questions given. Removed patient PIV and telemetry monitoring. Medication education given and reviewed with patient regarding new medications at discharge. Follow up appt scheduled with PCP. Cardiology office will call patient at home. PCT wheeling patient to discharge.

## 2017-09-27 NOTE — PROGRESS NOTES
9/27/2017     Admit Date: 9/26/2017    Admit Diagnosis: Chest pain    Active Problems:    Chest pain (7/2/2017)        Assessment/Plan:  1. Mr LYNN was successfully convert to sinus rhythm with one 200  Joule shock  2. He can be discharged after recovery in 3-4 hours  3. Dr Max Carrera (EP) will call the patient to set up his atrial flutter ablation in the next couple of days     Subjective:  No complaints       Claudia Liceas. denies chest pain. Objective:     Visit Vitals    /82 (BP 1 Location: Right arm, BP Patient Position: At rest)    Pulse 73    Temp 97.6 °F (36.4 °C)    Resp 25    Ht 5' 8\" (1.727 m)    Wt 90.2 kg (198 lb 13.7 oz)    SpO2 96%    BMI 30.24 kg/m2        Physical Exam:  Neck: supple, symmetrical, trachea midline, no adenopathy, thyroid: not enlarged, symmetric, no tenderness/mass/nodules, no carotid bruit and no JVD  Heart: irregularly irregular rhythm, S1, S2 normal  Lungs: rhonchi R anterior, L anterior  Abdomen: soft, non-tender. Bowel sounds normal. No masses,  no organomegaly  Extremities: edema 1+  Pulses: 2+ and symmetric  Neurologic: Grossly normal      Labs:    Recent Labs      09/26/17   0912   TROIQ  <0.04     Recent Labs      09/27/17   0551   NA  141   K  3.7   CL  106   BUN  20   CREA  1.63*   GLU  110*   CA  8.8     Recent Labs      09/26/17   0256   WBC  9.9   HGB  10.4*   HCT  32.9*   PLT  233     No results for input(s): CHOL, LDLC in the last 72 hours.     No lab exists for component: TGL, HDLC,  HBA1C    Telemetry: normal sinus rhythm, atrial paced rhythm     Data Review: current meds, labs,recent radiology, intake/output/weight and problem list reviewed

## 2017-09-27 NOTE — DISCHARGE SUMMARY
Discharge Summary       PATIENT ID: Leonor Francis Sr. MRN: [id number]   YOB: 1930    DATE OF ADMISSION: 9/26/2017  2:23 AM    DATE OF DISCHARGE: 9/27/2017   PRIMARY CARE PROVIDER: Nathan Wan MD     ATTENDING PHYSICIAN: Dr. Joe Parks  DISCHARGING PROVIDER: Kishor Joseph NP    To contact this individual call [phone] and ask the  to page. If unavailable ask to be transferred the Adult Hospitalist Department. CONSULTATIONS: IP CONSULT TO CARDIOLOGY  IP CONSULT TO CARDIOLOGY  IP CONSULT TO HOSPITALIST    PROCEDURES/SURGERIES: * No surgery found *    ADMITTING [address] [facility] COURSE:   Mr Geovanna Akhtar is an 45-year-old male with PMHx of a-fib, cad, ICOM w/ an ef of 35% on last echo on 7/2017 who presented to the ED on 9/26 with sharp left sided chest pain, 8/10, reproduceable with palpation and orthopnea. Pt was found to be a-flutter RVR, placed on cardizem drip and admitted for further work up. Pt had a successful cardioversion x 1 shock which returned him to NSR by Dr. Mark Winchester. He is stable for discharge. Dr. Odalys Chapman, EP, will contact him this week for an ablation. VSS. New medication: cardizem IR 30 mg.        DISCHARGE DIAGNOSES / PLAN:      Atypical Chest pain (POA) - Resolved  - recent cath was stable  - troponin negative x 2  - does not appear cardiac in nature, cardiology evaluated     Recurrent A-Flutter with RVR (POA)  - started on cardizem drip on admission  - c/w coreg, eliquis  - cardiology following  - pt had a DCCV on 8/11 that was successful after 1 shock  - pt ultimately needs an a-flutter ablation, however, this cannot be scheduled this week for Dr. Olivia Gore  - s/p PAMELA/CV     Chronic Systolic CHF - compensated  - s/p pm placement  - bnp 6466 on admission  - c/w coreg, not on an ace-I likely 2/2 ckd     Hyperlipidemia  - c/w home Lipitor     Coronary artery disease  - cardiac cath on 7/7/17:  moderate 50% proximal CX stenosis, severe stenosis of the very distal end of OM3, nonocclusive disease of the LAD, normal nondominant RCA, normal LV filling pressure     CKD stage III - stable          PENDING TEST RESULTS:   At the time of discharge the following test results are still pending: none    FOLLOW UP APPOINTMENTS:    Follow-up Information     Follow up With Details Comments Contact Info    Dedra Rogers MD  follow up this week Sami Mckinney [address] Cardiovascular [facility] [address] [facility]      Noel Loya MD   [address] [phone]             ADDITIONAL CARE RECOMMENDATIONS:   Diltiazem 30 mg IR four times a day - new med    Dr. Nils Kraft will follow up with you this week for an ablation. Call Dr. Nelson Velazquez office,let them know that Dr. Cade Garcia performed a cardioversion for atrial flutter and you need to be set up for an ablation with Dr. Krystyna Guzmán. Dr. Crow Arenas has spoken with Dr. Nils Kraft on this matter. DIET: Cardiac    ACTIVITY: as tolerated    WOUND CARE: none    EQUIPMENT needed: none      DISCHARGE MEDICATIONS:  Current Discharge Medication List      START taking these medications    Details   dilTIAZem (CARDIZEM) 30 mg tablet Take 1 Tab by mouth four (4) times daily. Qty: 60 Tab, Refills: 1         CONTINUE these medications which have NOT CHANGED    Details   carvedilol (COREG) 3.125 mg tablet Take 6.25 mg by mouth two (2) times a day. Pt increased dose to two 3.125 mg tablets (=6.25 mg) BID last Tuesday, 9/19. apixaban (ELIQUIS) 2.5 mg tablet Take 2.5 mg by mouth two (2) times a day. beclomethasone (QVAR) 80 mcg/actuation aero Take 2 Puffs by inhalation two (2) times daily as needed. atorvastatin (LIPITOR) 80 mg tablet Take 80 mg by mouth nightly. nitroglycerin (NITROSTAT) 0.4 mg SL tablet 1 Tab by SubLINGual route every five (5) minutes as needed for Chest Pain for up to 3 doses.   Qty: 1 Bottle, Refills: 3      bumetanide (BUMEX) 1 mg tablet Take 0.5 mg by mouth every evening. NOTIFY YOUR PHYSICIAN FOR ANY OF THE FOLLOWING:   Fever over 101 degrees for 24 hours. Chest pain, shortness of breath, fever, chills, nausea, vomiting, diarrhea, change in mentation, falling, weakness, bleeding. Severe pain or pain not relieved by medications. Or, any other signs or symptoms that you may have questions about. DISPOSITION:  xx  Home With:   OT  PT  HH  RN       Long term SNF/Inpatient Rehab    Independent/assisted living    Hospice    Other:       PATIENT CONDITION AT DISCHARGE:     Functional status    Poor     Deconditioned    xx Independent      Cognition   xx  Lucid     Forgetful     Dementia      Catheters/lines (plus indication)    [place]     PICC     PEG    xx None      Code status    xx Full code     DNR      PHYSICAL EXAMINATION AT DISCHARGE:  Constitutional:  No acute distress, cooperative, pleasant    ENT:  Oral mucous moist, oropharynx benign. Neck supple. Left eye blindness   Resp:  CTA bilaterally. No wheezing/rhonchi/rales. No accessory muscle use   CV:  irregularly irregular rhythm, normal rate, no murmurs, gallops, rubs    GI:  Soft, non distended, non tender. normoactive bowel sounds, no hepatosplenomegaly     Musculoskeletal:  No edema, warm, 2+ pulses throughout    Neurologic:  Moves all extremities. AAOx3, CN II-XII reviewed                                             Psych:  Good insight, Not anxious nor agitated. Skin:  Good turgor, no rashes or ulcers            CHRONIC MEDICAL DIAGNOSES:  Problem List as of 9/27/2017  Date Reviewed: 9/27/2017          Codes Class Noted - Resolved    Dislocation of hip prosthesis ([facility] [address] ICD-10-CM: V96.889N, Z96.649  ICD-9-CM: 996.42, V43.64  7/31/2013 - Present    Overview Signed 7/31/2013  6:14 PM by Rudolph Seip, MD     Right hip dislocation             Manasa-prosthetic fracture around prosthetic hip (Chronic) ICD-10-CM: M97. Seth Iqbal, [id number]  ICD-9-CM: 996.44, V43.64  5/30/2011 - Present        Dyslipidemia (Chronic) ICD-10-CM: E78.5  ICD-9-CM: 272.4  5/30/2011 - Present        HBP (high blood pressure) ICD-10-CM: I10  ICD-9-CM: 401.9  5/30/2011 - Present        Dislocated toe ICD-10-CM: Y61.356F  ICD-9-CM: 838.09  5/30/2011 - Present        RESOLVED: Chest pain ICD-10-CM: R07.9  ICD-9-CM: 786.50  7/2/2017 - 9/27/2017              Greater than 25 minutes were spent with the patient on counseling and coordination of care    Signed:   Arnulfo Garza NP  9/27/2017  3:05 PM

## 2017-09-27 NOTE — PROGRESS NOTES
TRANSFER - OUT REPORT:    Verbal report given to BRIGIDA Oliveira ([facility]) on Cecil Alcala. being transferred to [facility] for routine progression of care       Report consisted of patients Situation, Background, Assessment and   Recommendations(SBAR). Information from the following report(s) Procedure Summary, MAR and Recent Results was reviewed with the receiving nurse. Opportunity for questions and clarification was provided.

## 2017-09-27 NOTE — PROGRESS NOTES
Transfer to [address] [place] [address] from Procedure Area    Verbal report given to Fabricio Flores on Archie Ross. being transferred to Cardiac Cath Lab  for ordered procedure   Patient is post PAMELA/CV procedure. Patient stable upon transfer to . Report consisted of patients Situation, Background, Assessment and   Recommendations(SBAR). Information from the following report(s) SBAR, Kardex, Procedure Summary, Intake/Output, MAR and Recent Results was reviewed with the receiving nurse. Opportunity for questions and clarification was provided. Patient medicated during procedure with orders obtained and verified by Dr. Mazin Umaña. Refer to patient PROCEDURE REPORT for vital signs, assessment, status, and response during procedure.

## 2017-10-01 NOTE — PROCEDURES
[address] [facility] [place]  [place] PROCEDURE       Name:  Randi Tafoya   MR#:  [id number]   :  1930   Account #:  [de-identified]    Date of Procedure:  2017   Date of Adm:  2017       STUDY/CINE: **    ANESTHESIA:  Conscious sedation    The patient presented to the hospital with recurrence of his atrial   fibrillation. When he goes into atrial fibrillation it makes his heart failure   much worse and he is quite comfortable, quite short of breath and   dyspneic. He has only been back on apixaban for about 8 days since it   was restarted, and for this reason a transesophageal echocardiogram   was done prior to cardioversion. Transesophageal echocardiogram did   not demonstrate any clots. Please refer to the detailed structured   report. The patient received propofol from the anesthesia department   and received 200 joules of biphasic energy in a shock x1, converting to   sinus rhythm. This was well tolerated. The patient was sent to recovery   in satisfactory condition and ultimately was discharged home the same   evening, with plans for an atrial flutter ablation. CONCLUSIONS: Successful conversion of atrial flutter to sinus   rhythm, well tolerated, no complications.         MD ALEXIS Roberson / ARACELY   D:  10/01/2017   10:46   T:  10/01/2017   11:36   Job #:  [id number]

## 2018-06-18 ENCOUNTER — HOSPITAL ENCOUNTER (EMERGENCY)
Age: 83
Discharge: HOME | End: 2018-06-19
Attending: EMERGENCY MEDICINE
Payer: MEDICARE

## 2018-06-18 ENCOUNTER — APPOINTMENT (OUTPATIENT)
Dept: GENERAL RADIOLOGY | Age: 83
End: 2018-06-18
Attending: EMERGENCY MEDICINE
Payer: MEDICARE

## 2018-06-18 DIAGNOSIS — R53.1 WEAKNESS: Primary | ICD-10-CM

## 2018-06-18 LAB
ALBUMIN SERPL-MCNC: 3.3 G/DL (ref 3.5–5)
ALBUMIN/GLOB SERPL: 0.8 {RATIO} (ref 1.1–2.2)
ALP SERPL-CCNC: 104 U/L (ref 45–117)
ALT SERPL-CCNC: 15 U/L (ref 12–78)
ANION GAP SERPL CALC-SCNC: 14 MMOL/L (ref 5–15)
APPEARANCE UR: CLEAR
APTT PPP: 22 SEC (ref 22.1–32)
AST SERPL-CCNC: 19 U/L (ref 15–37)
BACTERIA URNS QL MICRO: NEGATIVE /HPF
BASOPHILS # BLD: 0.1 K/UL (ref 0–0.1)
BASOPHILS NFR BLD: 1 % (ref 0–1)
BILIRUB SERPL-MCNC: 0.5 MG/DL (ref 0.2–1)
BILIRUB UR QL: NEGATIVE
BNP SERPL-MCNC: 1088 PG/ML (ref 0–450)
BUN SERPL-MCNC: 33 MG/DL (ref 6–20)
BUN/CREAT SERPL: 12 (ref 12–20)
CALCIUM SERPL-MCNC: 8.8 MG/DL (ref 8.5–10.1)
CHLORIDE SERPL-SCNC: 108 MMOL/L (ref 97–108)
CO2 SERPL-SCNC: 21 MMOL/L (ref 21–32)
COLOR UR: ABNORMAL
CREAT SERPL-MCNC: 2.66 MG/DL (ref 0.7–1.3)
DIFFERENTIAL METHOD BLD: ABNORMAL
EOSINOPHIL # BLD: 0.3 K/UL (ref 0–0.4)
EOSINOPHIL NFR BLD: 3 % (ref 0–7)
EPITH CASTS URNS QL MICRO: ABNORMAL /LPF
ERYTHROCYTE [DISTWIDTH] IN BLOOD BY AUTOMATED COUNT: 15.3 % (ref 11.5–14.5)
GLOBULIN SER CALC-MCNC: 4.2 G/DL (ref 2–4)
GLUCOSE SERPL-MCNC: 149 MG/DL (ref 65–100)
GLUCOSE UR STRIP.AUTO-MCNC: NEGATIVE MG/DL
HCT VFR BLD AUTO: 39.3 % (ref 36.6–50.3)
HGB BLD-MCNC: 12.2 G/DL (ref 12.1–17)
HGB UR QL STRIP: ABNORMAL
HYALINE CASTS URNS QL MICRO: ABNORMAL /LPF (ref 0–5)
IMM GRANULOCYTES # BLD: 0.1 K/UL (ref 0–0.04)
IMM GRANULOCYTES NFR BLD AUTO: 0 % (ref 0–0.5)
INR PPP: 1.1 (ref 0.9–1.1)
KETONES UR QL STRIP.AUTO: NEGATIVE MG/DL
LACTATE SERPL-SCNC: 1.3 MMOL/L (ref 0.4–2)
LEUKOCYTE ESTERASE UR QL STRIP.AUTO: NEGATIVE
LYMPHOCYTES # BLD: 2.6 K/UL (ref 0.8–3.5)
LYMPHOCYTES NFR BLD: 20 % (ref 12–49)
MCH RBC QN AUTO: 28.9 PG (ref 26–34)
MCHC RBC AUTO-ENTMCNC: 31 G/DL (ref 30–36.5)
MCV RBC AUTO: 93.1 FL (ref 80–99)
MONOCYTES # BLD: 1.1 K/UL (ref 0–1)
MONOCYTES NFR BLD: 8 % (ref 5–13)
NEUTS SEG # BLD: 8.6 K/UL (ref 1.8–8)
NEUTS SEG NFR BLD: 68 % (ref 32–75)
NITRITE UR QL STRIP.AUTO: NEGATIVE
NRBC # BLD: 0 K/UL (ref 0–0.01)
NRBC BLD-RTO: 0 PER 100 WBC
PH UR STRIP: 7 [PH] (ref 5–8)
PLATELET # BLD AUTO: 172 K/UL (ref 150–400)
PMV BLD AUTO: 10.7 FL (ref 8.9–12.9)
POTASSIUM SERPL-SCNC: 4.2 MMOL/L (ref 3.5–5.1)
PROT SERPL-MCNC: 7.5 G/DL (ref 6.4–8.2)
PROT UR STRIP-MCNC: 30 MG/DL
PROTHROMBIN TIME: 11.4 SEC (ref 9–11.1)
RBC # BLD AUTO: 4.22 M/UL (ref 4.1–5.7)
RBC #/AREA URNS HPF: ABNORMAL /HPF (ref 0–5)
SODIUM SERPL-SCNC: 143 MMOL/L (ref 136–145)
SP GR UR REFRACTOMETRY: 1.01 (ref 1–1.03)
THERAPEUTIC RANGE,PTTT: ABNORMAL SECS (ref 58–77)
TROPONIN I BLD-MCNC: <0.04 NG/ML (ref 0–0.08)
UR CULT HOLD, URHOLD: NORMAL
UROBILINOGEN UR QL STRIP.AUTO: 0.2 EU/DL (ref 0.2–1)
WBC # BLD AUTO: 12.7 K/UL (ref 4.1–11.1)
WBC URNS QL MICRO: ABNORMAL /HPF (ref 0–4)

## 2018-06-18 PROCEDURE — 99285 EMERGENCY DEPT VISIT HI MDM: CPT

## 2018-06-18 PROCEDURE — 93005 ELECTROCARDIOGRAM TRACING: CPT

## 2018-06-18 PROCEDURE — 71046 X-RAY EXAM CHEST 2 VIEWS: CPT

## 2018-06-18 PROCEDURE — 85730 THROMBOPLASTIN TIME PARTIAL: CPT | Performed by: EMERGENCY MEDICINE

## 2018-06-18 PROCEDURE — 36415 COLL VENOUS BLD VENIPUNCTURE: CPT | Performed by: EMERGENCY MEDICINE

## 2018-06-18 PROCEDURE — 83880 ASSAY OF NATRIURETIC PEPTIDE: CPT | Performed by: EMERGENCY MEDICINE

## 2018-06-18 PROCEDURE — 83605 ASSAY OF LACTIC ACID: CPT | Performed by: EMERGENCY MEDICINE

## 2018-06-18 PROCEDURE — 96361 HYDRATE IV INFUSION ADD-ON: CPT

## 2018-06-18 PROCEDURE — 81001 URINALYSIS AUTO W/SCOPE: CPT | Performed by: EMERGENCY MEDICINE

## 2018-06-18 PROCEDURE — 74011250636 HC RX REV CODE- 250/636: Performed by: EMERGENCY MEDICINE

## 2018-06-18 PROCEDURE — 96374 THER/PROPH/DIAG INJ IV PUSH: CPT

## 2018-06-18 PROCEDURE — 85610 PROTHROMBIN TIME: CPT | Performed by: EMERGENCY MEDICINE

## 2018-06-18 PROCEDURE — 84484 ASSAY OF TROPONIN QUANT: CPT

## 2018-06-18 PROCEDURE — 85025 COMPLETE CBC W/AUTO DIFF WBC: CPT | Performed by: EMERGENCY MEDICINE

## 2018-06-18 PROCEDURE — 80053 COMPREHEN METABOLIC PANEL: CPT | Performed by: EMERGENCY MEDICINE

## 2018-06-18 RX ORDER — ONDANSETRON 2 MG/ML
4 INJECTION INTRAMUSCULAR; INTRAVENOUS
Status: COMPLETED | OUTPATIENT
Start: 2018-06-18 | End: 2018-06-18

## 2018-06-18 RX ADMIN — ONDANSETRON 4 MG: 2 INJECTION INTRAMUSCULAR; INTRAVENOUS at 22:15

## 2018-06-18 RX ADMIN — SODIUM CHLORIDE 1000 ML: 900 INJECTION, SOLUTION INTRAVENOUS at 23:28

## 2018-06-19 ENCOUNTER — TELEPHONE (OUTPATIENT)
Dept: CASE MANAGEMENT | Age: 83
End: 2018-06-19

## 2018-06-19 ENCOUNTER — APPOINTMENT (OUTPATIENT)
Dept: CT IMAGING | Age: 83
End: 2018-06-19
Attending: EMERGENCY MEDICINE
Payer: MEDICARE

## 2018-06-19 ENCOUNTER — HOME HEALTH ADMISSION (OUTPATIENT)
Dept: HOME HEALTH SERVICES | Facility: HOME HEALTH | Age: 83
End: 2018-06-19
Payer: MEDICARE

## 2018-06-19 VITALS
SYSTOLIC BLOOD PRESSURE: 171 MMHG | OXYGEN SATURATION: 99 % | RESPIRATION RATE: 18 BRPM | HEART RATE: 66 BPM | DIASTOLIC BLOOD PRESSURE: 94 MMHG | TEMPERATURE: 98.6 F

## 2018-06-19 LAB
ATRIAL RATE: 66 BPM
CALCULATED P AXIS, ECG09: 75 DEGREES
CALCULATED T AXIS, ECG11: 38 DEGREES
DIAGNOSIS, 93000: NORMAL
P-R INTERVAL, ECG05: 384 MS
Q-T INTERVAL, ECG07: 488 MS
QRS DURATION, ECG06: 140 MS
QTC CALCULATION (BEZET), ECG08: 511 MS
VENTRICULAR RATE, ECG03: 66 BPM

## 2018-06-19 PROCEDURE — 74176 CT ABD & PELVIS W/O CONTRAST: CPT

## 2018-06-19 PROCEDURE — 70450 CT HEAD/BRAIN W/O DYE: CPT

## 2018-06-19 NOTE — ED NOTES
2230: Patient with no vomiting since arrival. Family at bedside. 0130: Patient and present family updated on plan of care and treatment, verbalized understanding. No questions or needs expressed at this time. 80: Patient ambulated with walker x 2 RN; gait is unsteady and requires stand by assistance for safety. MD aware. 0430: After discussion with provider patient elects to go home rather than be admitted. MD reviewed discharge instructions and options with patient and patient verbalized understanding. RN reviewed discharge instructions using teachback method. Pt wheeled to exit without difficulty and in no signs of acute distress escorted by daughter, and she  will drive home. No complaints or needs expressed at this time. Patient was counseled on medications prescribed at discharge. VSS, verbalized relief from most intense pain. Patient to call PCP in the morning for appointment.

## 2018-06-19 NOTE — ED PROVIDER NOTES
HPI Comments: 80 y.o. male with past medical history significant for CAD, h/o A-Flutter, chronic sCHF, s/p pacemaker placement, HLD, CKD stage 3, who presents via EMS complaining of nausea and vomiting throughout the day today. Patient states that he has not been feeling well since last Tuesday, 6/12/2018. He specifically complains of productive cough, nausea, vomiting, and epigastric abdominal pain. The abdominal pain is \"achey and sore\" in quality, and rated as severe. He reports exacerbation of the pain with deep inspiration, but he denies any SOB. Pt states that he saw his PCP who \"ran a lot of tests but couldn't find anything wrong\". The nausea and vomiting have been worse today, accompanied by generalized weakness. Pt estimates that he had three episodes of vomiting tonight. He was ambulating tonight at home and felt \"too weak to get to the door\" so he called EMS. En route, EMS performed as 12-lead EKG and saw ST-segment elevation, so they called a STEMI alert from the field. Patient denies any chest pain with his symptoms. Also denies current nausea, and denies recent change in PO intake. Pt reports that he has been taking his medications as prescribed, and he took his nightly doses tonight. Of note, pt has chronic left eye discharge for the past 20 years. Is currently on daily ophthalmic medication, and he denies any eye pain. Pt specifically denies any fevers or diarrhea. HPI is overall limited as the patient is a poor historian. Review of medical records indicates that patient was most recently admitted to the hospital 9/26-9/27/2018 for atypical chest pain and recurrent A-Flutter with RVR. Most recent cardiac catheterization was performed on on 7/7/17 showing moderate 50% proximal CX stenosis, severe stenosis of the very distal end of OM3, nonocclusive disease of the LAD, normal nondominant RCA, and normal LV filling pressure.      Social hx: Negative for Tobacco use; Negative for EtOH use;  Patient lives at home alone. PCP: Yesica Medeiros MD    Note written by Phil Toussaint, as dictated by David Pablo MD 10:00 PM     The history is provided by the patient, the EMS personnel and medical records. History limited by: patient is a poor historian. No  was used. Past Medical History:   Diagnosis Date    CAD (coronary artery disease)     Heart failure ([address]     Hypertension        Past Surgical History:   Procedure Laterality Date    HX ORTHOPAEDIC      bilateral hip replacement    HX PACEMAKER           History reviewed. No pertinent family history. Social History     Social History    Marital status:      Spouse name: N/A    Number of children: N/A    Years of education: N/A     Occupational History    Not on file. Social History Main Topics    Smoking status: Never Smoker    Smokeless tobacco: Never Used    Alcohol use No    Drug use: Not on file    Sexual activity: Not on file     Other Topics Concern    Not on file     Social History Narrative         ALLERGIES: Penicillins    Review of Systems   Constitutional: Positive for fatigue. Negative for appetite change, chills and fever. HENT: Negative for nosebleeds, sore throat, trouble swallowing and voice change. Eyes: Positive for discharge (chronic). Negative for pain and visual disturbance. Respiratory: Positive for cough. Negative for shortness of breath. Cardiovascular: Negative for chest pain and palpitations. Gastrointestinal: Positive for abdominal pain, nausea and vomiting. Negative for diarrhea. Genitourinary: Negative for difficulty urinating, dysuria, hematuria and urgency. Musculoskeletal: Negative for back pain, neck pain and neck stiffness. Skin: Negative for color change. Allergic/Immunologic: Negative for immunocompromised state. Neurological: Positive for weakness (generalized). Negative for seizures, syncope, numbness and headaches. Psychiatric/Behavioral: Negative for behavioral problems, confusion, hallucinations, self-injury and suicidal ideas. Vitals:    06/18/18 2158 06/18/18 2330 06/19/18 0130 06/19/18 0200   BP:  161/90 (!) 175/92 167/86   Pulse:  62 66 65   Resp:   18 18   Temp:   98.9 °F (37.2 °C)    SpO2: 95% 94% 99% 98%            Physical Exam   Constitutional: He is oriented to person, place, and time. He appears well-developed and well-nourished. No distress. HENT:   Head: Normocephalic and atraumatic. Eyes: Pupils are equal, round, and reactive to light. Cloudy left cornea with discharge present    Neck: Normal range of motion. Neck supple. Cardiovascular: Normal rate, regular rhythm and normal heart sounds. Exam reveals no gallop and no friction rub. No murmur heard. Pulmonary/Chest: Effort normal. No respiratory distress. He has no wheezes. He has rales (scattered). Abdominal: Soft. Bowel sounds are normal. He exhibits no distension. There is tenderness in the epigastric area. There is no rebound and no guarding. Musculoskeletal: Normal range of motion. No appreciable pedal edema   Neurological: He is alert and oriented to person, place, and time. Skin: Skin is warm. No rash noted. He is not diaphoretic. Psychiatric: He has a normal mood and affect. His behavior is normal. Judgment and thought content normal.   Nursing note and vitals reviewed. Note written by Angella Coleman. David Aguila, as dictated by Concepción Cooper MD 10:00 PM      [facility]      ED Course     This is a 26-year-old male with past medical history, review of systems, physical exam as above, presenting via EMS for complaints of STEMI. Patient states he's been experiencing up approximately one week of cough, productive of clear sputum, epigastric abdominal pain, nausea and vomiting, EMS states that feel EKG indicated ST elevation MI.  Upon arrival, patient is awake, alert, conversing epigastric pain and cough as above, denying chest pain, denies shortness of breath. Physical exam remarkable for elderly male, in no acute distress, with mild scattered Rales, epigastric tenderness to palpation, without rebound guarding, abdomen otherwise unremarkable. After evaluating his EKG, I spoke with the cardiologist and canceled the STEMI alert. Plan to proceed with evaluation for abdominal pain, cough, noting his history of mild congestive heart failure, A. fib. Plan to obtain chest x-ray, CMP, CBC, UA, cardiac enzymes, coags. We will make a disposition based on the patient's diagnostics and response to therapy. Procedures    ED EKG interpretation:  Time 2149  Rhythm: Atrial sensed ventricular paced rhythm; and regular . Rate (approx.): 66 bpm; ST/T wave: biphasic t-waves in anterior leads; similar to prior EKG in 09/2017. No other ST-T changes or abnormalities. Note written by Nate Sales. Aislinn Hoover, as dictated by Amber Correia MD 9:50 PM     CONSULT NOTE:  9:51 PM Amber Correia MD spoke with Dr. Yuan Lund, Consult for Cardiology. Discussed EMS findings and ED EKG. Would like to be called back after the patient is evaluated. Based on EKG, will cancel STEMI alert. CONSULT NOTE:  10:33 PM Amber Correia MD spoke with Dr. Yuan Lund, Consult for Cardiology. Discussed available diagnostic tests and clinical findings. Dr. Yuan Lund recommends admission to the hospitalist for further work-up if warranted. PROGRESS NOTE:  2:45 AM   Will ambulate patient with assistance of nursing staff. If he does well, will prepare for discharge. 4:09 AM  Patient required some assistance with ambulation. Obtained head CT without acute change. Discussed admission with patient and daughter for weakness and concern for function at home, they declined admission. Will place Case Management consult for home health and offered PCP follow up and return precautions.

## 2018-06-19 NOTE — ED TRIAGE NOTES
Triage: Patient arrives via EMS from home as a STEMI prealert. Patient c/o nausea, vomiting and weakness all day. EKG repeated upon arrival showing paced rhythm. Handed to MD, code STEMI cancelled. Patient denies any CP, only nausea.

## 2018-06-19 NOTE — TELEPHONE ENCOUNTER
CM received SSED consult for CM to evaluate pt's home living situation following ED visit overnight. Chart reviewed. Pt is 80year old man, chart indicates he lives alone, called EMS for weakness, vomiting. He reported being seen recently at PCP for similar symptoms and had tests run. Concern over instability of gait during ED gait assessment. Per chart, pt/daughter declined admission and were comfortable with discharge home. SSED CM consult placed to follow up with pt/daughter regarding possibility of home health services to folow. CM placed call to pt's point of contact, his daughter, Darius Sandra, phone [phone]. This CM left message for her providing this SSED CM's name and number for follow up today. Noted pt PCP is Dr. Moo Vang. CM will also place call to PCP office to alert pt was seen overnight and indicate ED physician's recommendation for [facility]. No order for [facility] placed during ED visit. Outcome pending, waiting on return call from daughter.

## 2018-06-19 NOTE — TELEPHONE ENCOUNTER
Received call back from pt's daughter. Answered her questions and directed her on how to apply for Meals on Wheels for pt. Discussed [facility] services. Inquired about possibility for assisted living and she advised her father is adamant about remaining in his own home. She visits him daily and assists as needed. He has not qualified for medicaid in the past.    CM also called pt and he is amenable to [facility] services, no preference for agency when offered choice, he has used [phone] in past.  CM received order and sent referral to [phone]. Also left message on provider line for Dr. Alden Castelan, PCP.     Rosaura Cano MSW

## 2018-06-19 NOTE — DISCHARGE INSTRUCTIONS
Weakness: Care Instructions  Your Care Instructions    Weakness is a lack of physical or muscle strength. You may feel that you need to make extra effort to move your arms, legs, or other muscles. Generalized weakness means that you feel weak in most areas of your body. Another type of weakness may affect just one muscle or group of muscles. You may feel weak and tired after you have done too much activity, such as taking an extra-long hike. This is not a serious problem. It often goes away on its own. Feeling weak can also be caused by medical conditions like thyroid problems, depression, or a virus. Sometimes the cause can be serious. Your doctor may want to do more tests to try to find the cause of the weakness. The doctor has checked you carefully, but problems can develop later. If you notice any problems or new symptoms, get medical treatment right away. Follow-up care is a key part of your treatment and safety. Be sure to make and go to all appointments, and call your doctor if you are having problems. It's also a good idea to know your test results and keep a list of the medicines you take. How can you care for yourself at home? · Rest when you feel tired. · Be safe with medicines. If your doctor prescribed medicine, take it exactly as prescribed. Call your doctor if you think you are having a problem with your medicine. You will get more details on the specific medicines your doctor prescribes. · Do not skip meals. Eating a balanced diet may increase your energy level. · Get some physical activity every day, but do not get too tired. When should you call for help? Call your doctor now or seek immediate medical care if:  ? · You have new or worse weakness. ? · You are dizzy or lightheaded, or you feel like you may faint. ? Watch closely for changes in your health, and be sure to contact your doctor if:  ? · You do not get better as expected. Where can you learn more?   Go to http://cedric.info/. Enter [id number] in the search box to learn more about \"Weakness: Care Instructions. \"  Current as of: March 20, 2017  Content Version: 11.4  © 7106-9302 [employer]. Care instructions adapted under license by [employer] (which disclaims liability or warranty for this information). If you have questions about a medical condition or this instruction, always ask your healthcare professional. [address] any warranty or liability for your use of this information.

## 2018-06-20 ENCOUNTER — HOME CARE VISIT (OUTPATIENT)
Dept: SCHEDULING | Facility: HOME HEALTH | Age: 83
End: 2018-06-20
Payer: MEDICARE

## 2018-06-20 VITALS
HEIGHT: 70 IN | DIASTOLIC BLOOD PRESSURE: 77 MMHG | SYSTOLIC BLOOD PRESSURE: 134 MMHG | RESPIRATION RATE: 18 BRPM | HEART RATE: 66 BPM | OXYGEN SATURATION: 99 % | BODY MASS INDEX: 35.07 KG/M2 | TEMPERATURE: 98.9 F | WEIGHT: 245 LBS

## 2018-06-20 PROCEDURE — 3331090001 HH PPS REVENUE CREDIT

## 2018-06-20 PROCEDURE — 3331090002 HH PPS REVENUE DEBIT

## 2018-06-20 PROCEDURE — G0299 HHS/HOSPICE OF RN EA 15 MIN: HCPCS

## 2018-06-20 PROCEDURE — 400013 HH SOC

## 2018-06-21 PROCEDURE — 3331090002 HH PPS REVENUE DEBIT

## 2018-06-21 PROCEDURE — 3331090001 HH PPS REVENUE CREDIT

## 2018-06-22 ENCOUNTER — HOME CARE VISIT (OUTPATIENT)
Dept: SCHEDULING | Facility: HOME HEALTH | Age: 83
End: 2018-06-22
Payer: MEDICARE

## 2018-06-22 PROCEDURE — 3331090002 HH PPS REVENUE DEBIT

## 2018-06-22 PROCEDURE — 3331090001 HH PPS REVENUE CREDIT

## 2018-06-23 PROCEDURE — 3331090002 HH PPS REVENUE DEBIT

## 2018-06-23 PROCEDURE — 3331090001 HH PPS REVENUE CREDIT

## 2018-06-24 PROCEDURE — 3331090002 HH PPS REVENUE DEBIT

## 2018-06-24 PROCEDURE — 3331090001 HH PPS REVENUE CREDIT

## 2018-06-25 ENCOUNTER — HOME CARE VISIT (OUTPATIENT)
Dept: SCHEDULING | Facility: HOME HEALTH | Age: 83
End: 2018-06-25
Payer: MEDICARE

## 2018-06-25 VITALS
DIASTOLIC BLOOD PRESSURE: 93 MMHG | SYSTOLIC BLOOD PRESSURE: 156 MMHG | OXYGEN SATURATION: 96 % | RESPIRATION RATE: 20 BRPM | TEMPERATURE: 98.6 F | HEART RATE: 60 BPM

## 2018-06-25 PROCEDURE — 3331090002 HH PPS REVENUE DEBIT

## 2018-06-25 PROCEDURE — 3331090001 HH PPS REVENUE CREDIT

## 2018-06-25 PROCEDURE — G0151 HHCP-SERV OF PT,EA 15 MIN: HCPCS

## 2018-06-26 PROCEDURE — 3331090002 HH PPS REVENUE DEBIT

## 2018-06-26 PROCEDURE — 3331090001 HH PPS REVENUE CREDIT

## 2018-06-27 ENCOUNTER — HOME CARE VISIT (OUTPATIENT)
Dept: SCHEDULING | Facility: HOME HEALTH | Age: 83
End: 2018-06-27
Payer: MEDICARE

## 2018-06-27 VITALS
DIASTOLIC BLOOD PRESSURE: 87 MMHG | OXYGEN SATURATION: 98 % | HEART RATE: 60 BPM | SYSTOLIC BLOOD PRESSURE: 129 MMHG | RESPIRATION RATE: 20 BRPM | TEMPERATURE: 98.5 F

## 2018-06-27 PROCEDURE — G0151 HHCP-SERV OF PT,EA 15 MIN: HCPCS

## 2018-06-27 PROCEDURE — 3331090002 HH PPS REVENUE DEBIT

## 2018-06-27 PROCEDURE — 3331090001 HH PPS REVENUE CREDIT

## 2018-06-28 ENCOUNTER — HOME CARE VISIT (OUTPATIENT)
Dept: SCHEDULING | Facility: HOME HEALTH | Age: 83
End: 2018-06-28
Payer: MEDICARE

## 2018-06-28 VITALS
RESPIRATION RATE: 18 BRPM | DIASTOLIC BLOOD PRESSURE: 60 MMHG | SYSTOLIC BLOOD PRESSURE: 118 MMHG | OXYGEN SATURATION: 98 % | HEART RATE: 89 BPM | TEMPERATURE: 98.1 F

## 2018-06-28 PROCEDURE — 3331090002 HH PPS REVENUE DEBIT

## 2018-06-28 PROCEDURE — 3331090001 HH PPS REVENUE CREDIT

## 2018-06-28 PROCEDURE — G0300 HHS/HOSPICE OF LPN EA 15 MIN: HCPCS

## 2018-06-29 ENCOUNTER — HOME CARE VISIT (OUTPATIENT)
Dept: SCHEDULING | Facility: HOME HEALTH | Age: 83
End: 2018-06-29
Payer: MEDICARE

## 2018-06-29 VITALS
TEMPERATURE: 98.5 F | RESPIRATION RATE: 18 BRPM | SYSTOLIC BLOOD PRESSURE: 124 MMHG | DIASTOLIC BLOOD PRESSURE: 78 MMHG | OXYGEN SATURATION: 98 % | HEART RATE: 70 BPM

## 2018-06-29 PROCEDURE — 3331090001 HH PPS REVENUE CREDIT

## 2018-06-29 PROCEDURE — 3331090002 HH PPS REVENUE DEBIT

## 2018-06-29 PROCEDURE — G0300 HHS/HOSPICE OF LPN EA 15 MIN: HCPCS

## 2018-06-30 PROCEDURE — 3331090002 HH PPS REVENUE DEBIT

## 2018-06-30 PROCEDURE — 3331090001 HH PPS REVENUE CREDIT

## 2018-07-01 PROCEDURE — 3331090002 HH PPS REVENUE DEBIT

## 2018-07-01 PROCEDURE — 3331090001 HH PPS REVENUE CREDIT

## 2018-07-02 ENCOUNTER — HOME CARE VISIT (OUTPATIENT)
Dept: SCHEDULING | Facility: HOME HEALTH | Age: 83
End: 2018-07-02
Payer: MEDICARE

## 2018-07-02 PROCEDURE — 3331090001 HH PPS REVENUE CREDIT

## 2018-07-02 PROCEDURE — 3331090002 HH PPS REVENUE DEBIT

## 2018-07-02 PROCEDURE — G0151 HHCP-SERV OF PT,EA 15 MIN: HCPCS

## 2018-07-03 VITALS
TEMPERATURE: 97.9 F | SYSTOLIC BLOOD PRESSURE: 130 MMHG | RESPIRATION RATE: 20 BRPM | DIASTOLIC BLOOD PRESSURE: 87 MMHG | OXYGEN SATURATION: 97 % | HEART RATE: 88 BPM

## 2018-07-03 PROCEDURE — 3331090001 HH PPS REVENUE CREDIT

## 2018-07-03 PROCEDURE — 3331090002 HH PPS REVENUE DEBIT

## 2018-07-04 ENCOUNTER — HOME CARE VISIT (OUTPATIENT)
Dept: SCHEDULING | Facility: HOME HEALTH | Age: 83
End: 2018-07-04
Payer: MEDICARE

## 2018-07-04 PROCEDURE — G0300 HHS/HOSPICE OF LPN EA 15 MIN: HCPCS

## 2018-07-04 PROCEDURE — 3331090002 HH PPS REVENUE DEBIT

## 2018-07-04 PROCEDURE — 3331090001 HH PPS REVENUE CREDIT

## 2018-07-05 ENCOUNTER — HOME CARE VISIT (OUTPATIENT)
Dept: SCHEDULING | Facility: HOME HEALTH | Age: 83
End: 2018-07-05
Payer: MEDICARE

## 2018-07-05 VITALS
DIASTOLIC BLOOD PRESSURE: 78 MMHG | TEMPERATURE: 97.9 F | OXYGEN SATURATION: 97 % | SYSTOLIC BLOOD PRESSURE: 134 MMHG | HEART RATE: 89 BPM | RESPIRATION RATE: 20 BRPM

## 2018-07-05 PROCEDURE — 3331090002 HH PPS REVENUE DEBIT

## 2018-07-05 PROCEDURE — G0151 HHCP-SERV OF PT,EA 15 MIN: HCPCS

## 2018-07-05 PROCEDURE — 3331090001 HH PPS REVENUE CREDIT

## 2018-07-06 ENCOUNTER — HOME CARE VISIT (OUTPATIENT)
Dept: SCHEDULING | Facility: HOME HEALTH | Age: 83
End: 2018-07-06
Payer: MEDICARE

## 2018-07-06 PROCEDURE — 3331090002 HH PPS REVENUE DEBIT

## 2018-07-06 PROCEDURE — G0300 HHS/HOSPICE OF LPN EA 15 MIN: HCPCS

## 2018-07-06 PROCEDURE — 3331090001 HH PPS REVENUE CREDIT

## 2018-07-07 VITALS
DIASTOLIC BLOOD PRESSURE: 70 MMHG | SYSTOLIC BLOOD PRESSURE: 118 MMHG | HEART RATE: 64 BPM | RESPIRATION RATE: 18 BRPM | TEMPERATURE: 98.3 F | OXYGEN SATURATION: 99 %

## 2018-07-07 PROCEDURE — 3331090002 HH PPS REVENUE DEBIT

## 2018-07-07 PROCEDURE — 3331090001 HH PPS REVENUE CREDIT

## 2018-07-08 PROCEDURE — 3331090001 HH PPS REVENUE CREDIT

## 2018-07-08 PROCEDURE — 3331090002 HH PPS REVENUE DEBIT

## 2018-07-09 ENCOUNTER — HOME CARE VISIT (OUTPATIENT)
Dept: SCHEDULING | Facility: HOME HEALTH | Age: 83
End: 2018-07-09
Payer: MEDICARE

## 2018-07-09 VITALS
SYSTOLIC BLOOD PRESSURE: 121 MMHG | RESPIRATION RATE: 20 BRPM | HEART RATE: 76 BPM | TEMPERATURE: 98.1 F | OXYGEN SATURATION: 98 % | DIASTOLIC BLOOD PRESSURE: 88 MMHG

## 2018-07-09 PROCEDURE — 3331090002 HH PPS REVENUE DEBIT

## 2018-07-09 PROCEDURE — 3331090001 HH PPS REVENUE CREDIT

## 2018-07-09 PROCEDURE — G0151 HHCP-SERV OF PT,EA 15 MIN: HCPCS

## 2018-07-10 VITALS
RESPIRATION RATE: 18 BRPM | DIASTOLIC BLOOD PRESSURE: 68 MMHG | SYSTOLIC BLOOD PRESSURE: 122 MMHG | TEMPERATURE: 97.8 F | HEART RATE: 77 BPM | OXYGEN SATURATION: 97 %

## 2018-07-10 PROCEDURE — 3331090001 HH PPS REVENUE CREDIT

## 2018-07-10 PROCEDURE — 3331090002 HH PPS REVENUE DEBIT

## 2018-07-11 ENCOUNTER — APPOINTMENT (OUTPATIENT)
Dept: GENERAL RADIOLOGY | Age: 83
DRG: 683 | End: 2018-07-11
Attending: HOSPITALIST
Payer: MEDICARE

## 2018-07-11 ENCOUNTER — HOSPITAL ENCOUNTER (INPATIENT)
Age: 83
LOS: 1 days | Discharge: HOME HEALTH | DRG: 683 | End: 2018-07-14
Attending: EMERGENCY MEDICINE | Admitting: HOSPITALIST
Payer: MEDICARE

## 2018-07-11 ENCOUNTER — APPOINTMENT (OUTPATIENT)
Dept: CT IMAGING | Age: 83
DRG: 683 | End: 2018-07-11
Attending: EMERGENCY MEDICINE
Payer: MEDICARE

## 2018-07-11 DIAGNOSIS — R42 DIZZINESS: Primary | ICD-10-CM

## 2018-07-11 LAB
ALBUMIN SERPL-MCNC: 3.1 G/DL (ref 3.5–5)
ALBUMIN/GLOB SERPL: 0.7 {RATIO} (ref 1.1–2.2)
ALP SERPL-CCNC: 99 U/L (ref 45–117)
ALT SERPL-CCNC: 14 U/L (ref 12–78)
ANION GAP SERPL CALC-SCNC: 6 MMOL/L (ref 5–15)
APPEARANCE UR: CLEAR
AST SERPL-CCNC: 14 U/L (ref 15–37)
ATRIAL RATE: 62 BPM
BACTERIA URNS QL MICRO: NEGATIVE /HPF
BASOPHILS # BLD: 0 K/UL (ref 0–0.1)
BASOPHILS NFR BLD: 1 % (ref 0–1)
BILIRUB SERPL-MCNC: 0.5 MG/DL (ref 0.2–1)
BILIRUB UR QL: NEGATIVE
BUN SERPL-MCNC: 28 MG/DL (ref 6–20)
BUN/CREAT SERPL: 13 (ref 12–20)
CALCIUM SERPL-MCNC: 9.1 MG/DL (ref 8.5–10.1)
CALCULATED P AXIS, ECG09: 72 DEGREES
CALCULATED R AXIS, ECG10: -11 DEGREES
CALCULATED T AXIS, ECG11: 7 DEGREES
CHLORIDE SERPL-SCNC: 104 MMOL/L (ref 97–108)
CK SERPL-CCNC: 94 U/L (ref 39–308)
CO2 SERPL-SCNC: 26 MMOL/L (ref 21–32)
COLOR UR: ABNORMAL
CREAT SERPL-MCNC: 2.24 MG/DL (ref 0.7–1.3)
DIAGNOSIS, 93000: NORMAL
DIFFERENTIAL METHOD BLD: ABNORMAL
EOSINOPHIL # BLD: 0.2 K/UL (ref 0–0.4)
EOSINOPHIL NFR BLD: 3 % (ref 0–7)
EPITH CASTS URNS QL MICRO: ABNORMAL /LPF
ERYTHROCYTE [DISTWIDTH] IN BLOOD BY AUTOMATED COUNT: 15.5 % (ref 11.5–14.5)
GLOBULIN SER CALC-MCNC: 4.3 G/DL (ref 2–4)
GLUCOSE SERPL-MCNC: 114 MG/DL (ref 65–100)
GLUCOSE UR STRIP.AUTO-MCNC: NEGATIVE MG/DL
HCT VFR BLD AUTO: 39.2 % (ref 36.6–50.3)
HGB BLD-MCNC: 12.5 G/DL (ref 12.1–17)
HGB UR QL STRIP: ABNORMAL
HYALINE CASTS URNS QL MICRO: ABNORMAL /LPF (ref 0–5)
IMM GRANULOCYTES # BLD: 0 K/UL (ref 0–0.04)
IMM GRANULOCYTES NFR BLD AUTO: 0 % (ref 0–0.5)
KETONES UR QL STRIP.AUTO: NEGATIVE MG/DL
LEUKOCYTE ESTERASE UR QL STRIP.AUTO: NEGATIVE
LIPASE SERPL-CCNC: 98 U/L (ref 73–393)
LYMPHOCYTES # BLD: 1.5 K/UL (ref 0.8–3.5)
LYMPHOCYTES NFR BLD: 22 % (ref 12–49)
MCH RBC QN AUTO: 29.4 PG (ref 26–34)
MCHC RBC AUTO-ENTMCNC: 31.9 G/DL (ref 30–36.5)
MCV RBC AUTO: 92.2 FL (ref 80–99)
MONOCYTES # BLD: 0.7 K/UL (ref 0–1)
MONOCYTES NFR BLD: 10 % (ref 5–13)
NEUTS SEG # BLD: 4.6 K/UL (ref 1.8–8)
NEUTS SEG NFR BLD: 65 % (ref 32–75)
NITRITE UR QL STRIP.AUTO: NEGATIVE
NRBC # BLD: 0 K/UL (ref 0–0.01)
NRBC BLD-RTO: 0 PER 100 WBC
P-R INTERVAL, ECG05: 346 MS
PH UR STRIP: 6 [PH] (ref 5–8)
PLATELET # BLD AUTO: 170 K/UL (ref 150–400)
PMV BLD AUTO: 10.6 FL (ref 8.9–12.9)
POTASSIUM SERPL-SCNC: 4.3 MMOL/L (ref 3.5–5.1)
PROT SERPL-MCNC: 7.4 G/DL (ref 6.4–8.2)
PROT UR STRIP-MCNC: NEGATIVE MG/DL
Q-T INTERVAL, ECG07: 486 MS
QRS DURATION, ECG06: 122 MS
QTC CALCULATION (BEZET), ECG08: 493 MS
RBC # BLD AUTO: 4.25 M/UL (ref 4.1–5.7)
RBC #/AREA URNS HPF: ABNORMAL /HPF (ref 0–5)
SODIUM SERPL-SCNC: 136 MMOL/L (ref 136–145)
SP GR UR REFRACTOMETRY: 1 (ref 1–1.03)
TROPONIN I SERPL-MCNC: <0.05 NG/ML
UROBILINOGEN UR QL STRIP.AUTO: 0.2 EU/DL (ref 0.2–1)
VENTRICULAR RATE, ECG03: 62 BPM
WBC # BLD AUTO: 7 K/UL (ref 4.1–11.1)
WBC URNS QL MICRO: ABNORMAL /HPF (ref 0–4)

## 2018-07-11 PROCEDURE — 71045 X-RAY EXAM CHEST 1 VIEW: CPT

## 2018-07-11 PROCEDURE — 82607 VITAMIN B-12: CPT | Performed by: HOSPITALIST

## 2018-07-11 PROCEDURE — 36415 COLL VENOUS BLD VENIPUNCTURE: CPT | Performed by: EMERGENCY MEDICINE

## 2018-07-11 PROCEDURE — 82550 ASSAY OF CK (CPK): CPT | Performed by: HOSPITALIST

## 2018-07-11 PROCEDURE — 74011250636 HC RX REV CODE- 250/636: Performed by: EMERGENCY MEDICINE

## 2018-07-11 PROCEDURE — 81001 URINALYSIS AUTO W/SCOPE: CPT | Performed by: EMERGENCY MEDICINE

## 2018-07-11 PROCEDURE — 99285 EMERGENCY DEPT VISIT HI MDM: CPT

## 2018-07-11 PROCEDURE — 84484 ASSAY OF TROPONIN QUANT: CPT | Performed by: EMERGENCY MEDICINE

## 2018-07-11 PROCEDURE — 70450 CT HEAD/BRAIN W/O DYE: CPT

## 2018-07-11 PROCEDURE — 80053 COMPREHEN METABOLIC PANEL: CPT | Performed by: EMERGENCY MEDICINE

## 2018-07-11 PROCEDURE — 74011250637 HC RX REV CODE- 250/637: Performed by: HOSPITALIST

## 2018-07-11 PROCEDURE — 96374 THER/PROPH/DIAG INJ IV PUSH: CPT

## 2018-07-11 PROCEDURE — 93005 ELECTROCARDIOGRAM TRACING: CPT

## 2018-07-11 PROCEDURE — 3331090002 HH PPS REVENUE DEBIT

## 2018-07-11 PROCEDURE — 85025 COMPLETE CBC W/AUTO DIFF WBC: CPT | Performed by: EMERGENCY MEDICINE

## 2018-07-11 PROCEDURE — 96361 HYDRATE IV INFUSION ADD-ON: CPT

## 2018-07-11 PROCEDURE — 3331090001 HH PPS REVENUE CREDIT

## 2018-07-11 PROCEDURE — 74011250636 HC RX REV CODE- 250/636: Performed by: HOSPITALIST

## 2018-07-11 PROCEDURE — 99218 HC RM OBSERVATION: CPT

## 2018-07-11 PROCEDURE — 83690 ASSAY OF LIPASE: CPT | Performed by: EMERGENCY MEDICINE

## 2018-07-11 PROCEDURE — 80061 LIPID PANEL: CPT | Performed by: HOSPITALIST

## 2018-07-11 PROCEDURE — 83036 HEMOGLOBIN GLYCOSYLATED A1C: CPT | Performed by: HOSPITALIST

## 2018-07-11 PROCEDURE — 82746 ASSAY OF FOLIC ACID SERUM: CPT | Performed by: HOSPITALIST

## 2018-07-11 RX ORDER — CARVEDILOL 3.12 MG/1
6.25 TABLET ORAL 2 TIMES DAILY WITH MEALS
Status: DISCONTINUED | OUTPATIENT
Start: 2018-07-11 | End: 2018-07-11

## 2018-07-11 RX ORDER — SODIUM CHLORIDE 0.9 % (FLUSH) 0.9 %
5-10 SYRINGE (ML) INJECTION EVERY 8 HOURS
Status: DISCONTINUED | OUTPATIENT
Start: 2018-07-11 | End: 2018-07-14 | Stop reason: HOSPADM

## 2018-07-11 RX ORDER — SODIUM CHLORIDE 0.9 % (FLUSH) 0.9 %
5-10 SYRINGE (ML) INJECTION AS NEEDED
Status: DISCONTINUED | OUTPATIENT
Start: 2018-07-11 | End: 2018-07-14 | Stop reason: HOSPADM

## 2018-07-11 RX ORDER — BISACODYL 5 MG
5 TABLET, DELAYED RELEASE (ENTERIC COATED) ORAL DAILY PRN
Status: DISCONTINUED | OUTPATIENT
Start: 2018-07-11 | End: 2018-07-14 | Stop reason: HOSPADM

## 2018-07-11 RX ORDER — AMIODARONE HYDROCHLORIDE 200 MG/1
100 TABLET ORAL DAILY
Status: DISCONTINUED | OUTPATIENT
Start: 2018-07-12 | End: 2018-07-12

## 2018-07-11 RX ORDER — CARVEDILOL 12.5 MG/1
12.5 TABLET ORAL 2 TIMES DAILY WITH MEALS
Status: DISCONTINUED | OUTPATIENT
Start: 2018-07-11 | End: 2018-07-12

## 2018-07-11 RX ORDER — ATORVASTATIN CALCIUM 40 MG/1
80 TABLET, FILM COATED ORAL
Status: DISCONTINUED | OUTPATIENT
Start: 2018-07-12 | End: 2018-07-14 | Stop reason: HOSPADM

## 2018-07-11 RX ORDER — HYDRALAZINE HYDROCHLORIDE 20 MG/ML
10 INJECTION INTRAMUSCULAR; INTRAVENOUS
Status: DISCONTINUED | OUTPATIENT
Start: 2018-07-11 | End: 2018-07-14 | Stop reason: HOSPADM

## 2018-07-11 RX ORDER — CARVEDILOL 6.25 MG/1
6.25 TABLET ORAL 2 TIMES DAILY WITH MEALS
Status: ON HOLD | COMMUNITY
End: 2018-07-14

## 2018-07-11 RX ORDER — ACETAMINOPHEN 325 MG/1
650 TABLET ORAL
Status: DISCONTINUED | OUTPATIENT
Start: 2018-07-11 | End: 2018-07-14 | Stop reason: HOSPADM

## 2018-07-11 RX ORDER — OMEPRAZOLE 40 MG/1
40 CAPSULE, DELAYED RELEASE ORAL
COMMUNITY

## 2018-07-11 RX ORDER — ATORVASTATIN CALCIUM 20 MG/1
80 TABLET, FILM COATED ORAL
Status: DISCONTINUED | OUTPATIENT
Start: 2018-07-11 | End: 2018-07-11

## 2018-07-11 RX ORDER — ONDANSETRON 2 MG/ML
4 INJECTION INTRAMUSCULAR; INTRAVENOUS
Status: DISCONTINUED | OUTPATIENT
Start: 2018-07-11 | End: 2018-07-14 | Stop reason: HOSPADM

## 2018-07-11 RX ADMIN — HYDRALAZINE HYDROCHLORIDE 10 MG: 20 INJECTION INTRAMUSCULAR; INTRAVENOUS at 16:06

## 2018-07-11 RX ADMIN — ACETAMINOPHEN 650 MG: 325 TABLET ORAL at 16:57

## 2018-07-11 RX ADMIN — APIXABAN 2.5 MG: 2.5 TABLET, FILM COATED ORAL at 17:55

## 2018-07-11 RX ADMIN — Medication 10 ML: at 17:03

## 2018-07-11 RX ADMIN — ONDANSETRON 4 MG: 2 INJECTION INTRAMUSCULAR; INTRAVENOUS at 16:57

## 2018-07-11 RX ADMIN — Medication 10 ML: at 21:19

## 2018-07-11 RX ADMIN — CARVEDILOL 12.5 MG: 12.5 TABLET, FILM COATED ORAL at 17:55

## 2018-07-11 RX ADMIN — SODIUM CHLORIDE 500 ML: 900 INJECTION, SOLUTION INTRAVENOUS at 10:00

## 2018-07-11 NOTE — ED PROVIDER NOTES
HPI       80y M with hx of CAD, HTN here with dizziness. Off and on for the past few weeks. Worse last night. Also with nausea and vomiting. Feels generally weak but nothing focal. No fever. No chest pain. No trouble breathing. Seen here a few weeks ago for same and then had [facility] come to his house a few days ago for same. Feels like overall things are not heading the right direction. Past Medical History:   Diagnosis Date    CAD (coronary artery disease)     Heart failure ([address]     Hypertension        Past Surgical History:   Procedure Laterality Date    HX ORTHOPAEDIC      bilateral hip replacement    HX PACEMAKER           History reviewed. No pertinent family history. Social History     Social History    Marital status:      Spouse name: N/A    Number of children: N/A    Years of education: N/A     Occupational History    Not on file. Social History Main Topics    Smoking status: Never Smoker    Smokeless tobacco: Never Used    Alcohol use No    Drug use: Not on file    Sexual activity: Not on file     Other Topics Concern    Not on file     Social History Narrative         ALLERGIES: Penicillins    Review of Systems  Review of Systems   Constitutional: (-) weight loss. HEENT: (-) stiff neck   Eyes: (-) discharge. Respiratory: (-) for cough. Cardiovascular: (-) syncope. Gastrointestinal: (-) blood in stool. Genitourinary: (-) hematuria. Musculoskeletal: (-) myalgias. Neurological: (-) seizure. Skin: (-) petechiae  Lymph/Immunologic: (-) enlarged lymph nodes  All other systems reviewed and are negative. Vitals:    07/11/18 0858   BP: 137/81   Pulse: 63   Resp: 18   Temp: 97.8 °F (36.6 °C)   SpO2: 97%   Weight: 88.5 kg (195 lb)   Height: 5' 10\" (1.778 m)            Physical Exam Nursing note and vitals reviewed. Constitutional: oriented to person, place, and time. appears elderly and frail. No distress. Head: Normocephalic and atraumatic.  Sclera anicteric  Nose: No rhinorrhea  Mouth/Throat: Oropharynx is clear and moist. Pharynx normal  Eyes: Conjunctivae are normal. Pupils are equal, round, and reactive to light. Right eye exhibits no discharge. Left eye exhibits no discharge. Neck: Painless normal range of motion. Neck supple. No LAD. Cardiovascular: Normal rate, regular rhythm, normal heart sounds and intact distal pulses. Exam reveals no gallop and no friction rub. No murmur heard. Pulmonary/Chest:  No respiratory distress. No wheezes. No rales. No rhonchi. No increased work of breathing. No accessory muscle use. Good air exchange throughout. Abdominal: soft, non-tender, no rebound or guarding. No hepatosplenomegaly. Normal bowel sounds throughout. Back: no tenderness to palpation, no deformities, no CVA tenderness  Extremities/Musculoskeletal: Normal range of motion. no tenderness. No edema. Distal extremities are neurovasc intact. Lymphadenopathy:   No adenopathy. Neurological:  CN II-XII intact, 5/5 strength throughout, nl sensation throughout, nl cerebellar function, nl speech/fluency, nl gait/station, no pronator drift. Normal mental status. Skin: Skin is warm and dry. No rash noted. No pallor. MDM 80y M here with episodic dizziness. Unclear etiology. Plan for labs, fluids, CT head, ECG.       ED Course       Procedures

## 2018-07-11 NOTE — PROGRESS NOTES
Bedside and Verbal shift change report given to Praveen Farley (oncoming nurse) by Ean Gutiérrez RN (offgoing nurse). Report included the following information SBAR, Kardex, Intake/Output and MAR.

## 2018-07-11 NOTE — ACP (ADVANCE CARE PLANNING)
Request by patient, through , to assist with advance medical directive. Consulted with patients chart. Explained document to patient, who was present in the room. Pt would prefer to wait for his daughter to arrive before he discusses his AMD. Chaplains will follow up for support as needed.      [phone] Peggy Nicholson M.Div, M.S, [address] available at [phone]

## 2018-07-11 NOTE — ROUTINE PROCESS
TRANSFER - OUT REPORT:    Verbal report given to José Miguel Baig RN(name) on [facility] .  being transferred to [facility](unit) for routine progression of care       Report consisted of patients Situation, Background, Assessment and   Recommendations(SBAR). Information from the following report(s) SBAR was reviewed with the receiving nurse. Lines:   Peripheral IV 07/11/18 Right Hand (Active)   Site Assessment Clean, dry, & intact 7/11/2018  4:06 PM   Phlebitis Assessment 0 7/11/2018  4:06 PM   Infiltration Assessment 0 7/11/2018  4:06 PM   Dressing Status Clean, dry, & intact 7/11/2018  4:06 PM   Dressing Type Transparent 7/11/2018  4:06 PM        Opportunity for questions and clarification was provided.       Patient transported with:   Monitor

## 2018-07-11 NOTE — IP AVS SNAPSHOT
2700 [facility] [address] [place] [address] 
[phone] Patient: Darion Del Toro Sr. MRN: [id number] RGC:[id number]/[id number] About your hospitalization You were admitted on:  July 11, 2018 You last received care in the:  [facility] 6S NEURO-SCI TELE You were discharged on:  July 14, 2018 Why you were hospitalized Your primary diagnosis was:  Dizziness Follow-up Information Follow up With Details Comments Contact Info See Woodard MD Call As needed for primary care [address] 
[phone] Gerardo Leonardo MD Schedule an appointment as soon as possible for a visit in 2 weeks For follow up of heart failure [address] [place] [address] 505 1400 [facility] [address] 
[phone] Your Scheduled Appointments Tuesday July 17, 2018 To Be Determined PT ROUTINE with Maribel Ramos [facility] [employer] [place] ([address]) [address] ([address]) Wednesday July 18, 2018 To Be Determined ROUTINE with Uyen Argue [address] ([address]) [address] ([address]) Wednesday July 18, 2018 To Be Determined SKILLED NURSING SUPERVISORY VIST LPN with Uyen Argue [address] ([address]) [address] ([address]) Thursday July 19, 2018 To Be Determined PT ROUTINE with Maribel Ramos [facility] [employer] [place] ([address]) [address] ([address]) Monday July 23, 2018 To Be Determined PT ROUTINE with Maribel Ramos, [employer] [place] ([address]) [address] ([address]) Wednesday July 25, 2018 To Be Determined ROUTINE with Velmichael Restrepo, LPN  
[place] [address] ([address]) [address] ([address]) Thursday July 26, 2018 To Be Determined PT ROUTINE with Patria Rios, [address] ([address]) [address] ([address]) Wednesday August 01, 2018 To Be Determined ROUTINE with Velmichael Restrepo, LPN  
[place] [address] ([address]) [address] ([address]) Wednesday August 08, 2018 To Be Determined ROUTINE with Velmichael Restrepo, LPN  
[place] [address] ([address]) [address] ([address]) Wednesday August 15, 2018 To Be Determined [facility] NURSING DISCHARGE with Judd Lovett [address] ([address]) [address] ([address]) Discharge Orders None A check ofelia indicates which time of day the medication should be taken. My Medications START taking these medications Instructions Each Dose to Equal  
 Morning Noon Evening Bedtime  
 lidocaine 4 % patch Commonly known as:  SALONPAS/ASPERCREME Your last dose was: Your next dose is:    
   
   
 Apply 1 patch to painful area for 12 hours daily and then remove for 12 hours. Can be obtained over-the-counter. polyethylene glycol 17 gram packet Commonly known as:  Lelan Situ Your last dose was: Your next dose is: Take 1 Packet by mouth daily as needed. 17 g CHANGE how you take these medications Instructions Each Dose to Equal  
 Morning Noon Evening Bedtime  
 bumetanide 0.5 mg tablet Commonly known as:  Carly Juarez What changed:   
- medication strength 
- how much to take Your last dose was: Your next dose is: Take 0.5 Tabs by mouth daily. (dose = 0.5 x 1 mg tablet) 0.25 mg  
    
   
   
   
  
 carvedilol 25 mg tablet Commonly known as:  Hector Garcia What changed:   
- medication strength 
- how much to take Your last dose was: Your next dose is: Take 1 Tab by mouth two (2) times daily (with meals). 25 mg CONTINUE taking these medications Instructions Each Dose to Equal  
 Morning Noon Evening Bedtime ELIQUIS 2.5 mg tablet Generic drug:  apixaban Your last dose was: Your next dose is: Take 2.5 mg by mouth two (2) times a day. 2.5 mg  
    
   
   
   
  
 LIPITOR 80 mg tablet Generic drug:  atorvastatin Your last dose was: Your next dose is: Take 80 mg by mouth nightly. 80 mg  
    
   
   
   
  
 omeprazole 40 mg capsule Commonly known as:  PRILOSEC Your last dose was: Your next dose is: Take 40 mg by mouth Daily (before breakfast). 40 mg  
    
   
   
   
  
 potassium chloride 10 mEq tablet Commonly known as:  KLOR-CON Your last dose was: Your next dose is: Take 1 Cap by mouth daily. 1 cap with fluid pill 1 Cap STOP taking these medications   
 amiodarone 200 mg tablet Commonly known as:  CORDARONE Where to Get Your Medications Information on where to get these meds will be given to you by the nurse or doctor. ! Ask your nurse or doctor about these medications  
  bumetanide 0.5 mg tablet  
 carvedilol 25 mg tablet  
 lidocaine 4 % patch  
 polyethylene glycol 17 gram packet Discharge Instructions Please bring this form with you to show your primary care provider at your follow-up appointment. Primary care provider:  Dr. Collin Garcia MD 
 
Discharging provider:  Amara Mane MD 
 
You have been admitted to the hospital with the following diagnoses: · Dizziness FOLLOW-UP CARE RECOMMENDATIONS: 
 
APPOINTMENTS: 
Follow-up Information Follow up With Details Comments Contact Info Collin Garcia MD Call As needed for primary care [address] [place] [address], [place] 
[phone] Michele Tijerina MD Schedule an appointment as soon as possible for a visit in 2 weeks For follow up of heart failure 200 [facility] [address] [place] [address] 
[phone] SYMPTOMS to watch for: chest pain, shortness of breath, vomiting, diarrhea, change in mentation, falling, weakness, bleeding. DIET/what to eat:  Cardiac Diet ACTIVITY:  Activity as tolerated and PT/OT per Home Health What to do if new or unexpected symptoms occur? If you experience any of the above symptoms (or should other concerns or questions arise after discharge) please call your primary care physician. Return to the emergency room if you cannot get hold of your doctor. · It is very important that you keep your follow-up appointment(s). · Please bring discharge papers, medication list (and/or medication bottles) to your follow-up appointments for review by your outpatient provider(s). · Please check the list of medications and be sure it includes every medication (even non-prescription medications) that your provider wants you to take. · It is important that you take the medication exactly as they are prescribed. · Keep your medication in the bottles provided by the pharmacist and keep a list of the medication names, dosages, and times to be taken in your wallet. · Do not take other medications without consulting your doctor. · If you have any questions about your medications or other instructions, please talk to your nurse or care provider before you leave the hospital. 
 
I understand that if any problems occur once I am at home I am to contact my physician. These instructions were explained to me and I had the opportunity to ask questions. [employer]fang Announcement We are excited to announce that we are making your provider's discharge notes available to you in Wildfang. You will see these notes when they are completed and signed by the physician that discharged you from your recent hospital stay. If you have any questions or concerns about any information you see in Wildfang, please call the Health Information Department where you were seen or reach out to your Primary Care Provider for more information about your plan of care. Introducing [facility]! Ermelinda Tejeda introduces Wildfang patient portal. Now you can access parts of your medical record, email your doctor's office, and request medication refills online. 1. In your internet browser, go to [email or web address] 2. Click on the First Time User? Click Here link in the Sign In box. You will see the New Member Sign Up page. 3. Enter your Wildfang Access Code exactly as it appears below. You will not need to use this code after youve completed the sign-up process. If you do not sign up before the expiration date, you must request a new code. · Wildfang Access Code: [email or web address] Expires: 9/16/2018  9:49 PM 
 
4. Enter the last four digits of your Social Security Number (xxxx) and Date of Birth (mm/dd/yyyy) as indicated and click Submit. You will be taken to the next sign-up page. 5. Create a [employer]met ID. This will be your Wildfang login ID and cannot be changed, so think of one that is secure and easy to remember. 6. Create a C[employer]met password. You can change your password at any time. 7. Enter your Password Reset Question and Answer. This can be used at a later time if you forget your password. 8. Enter your e-mail address. You will receive e-mail notification when new information is available in [address] 9. Click Sign Up. You can now view and download portions of your medical record. 10. Click the Download Summary menu link to download a portable copy of your medical information. If you have questions, please visit the Frequently Asked Questions section of the [employer] website. Remember, [employer] is NOT to be used for urgent needs. For medical emergencies, dial 911. Now available from your iPhone and Android! Introducing Praveen Farley As a [employer] patient, I wanted to make you aware of our electronic visit tool called Praveen Farley. [employer] 24/7 allows you to connect within minutes with a medical provider 24 hours a day, seven days a week via a mobile device or tablet or logging into a secure website from your computer. You can access Praveen Farley from anywhere in the [place]. A virtual visit might be right for you when you have a simple condition and feel like you just dont want to get out of bed, or cant get away from work for an appointment, when your regular [employer] provider is not available (evenings, weekends or holidays), or when youre out of town and need minor care. Electronic visits cost only $49 and if the [employer] 24/7 provider determines a prescription is needed to treat your condition, one can be electronically transmitted to a nearby pharmacy*. Please take a moment to enroll today if you have not already done so. The enrollment process is free and takes just a few minutes. To enroll, please download the [employer] 24/7 son to your tablet or phone, or visit [email or web address] to enroll on your computer.    
And, as an [address] [place] [address] patient with a [employer] account, the results of your visits will be scanned into your electronic medical record and your primary care provider will be able to view the scanned results. We urge you to continue to see your regular [employer] provider for your ongoing medical care. And while your primary care provider may not be the one available when you seek a Praveen Farley virtual visit, the peace of mind you get from getting a real diagnosis real time can be priceless. For more information on Praveen [employer], view our Frequently Asked Questions (FAQs) at [email or web address]. Sincerely, 
 
Ale Tee MD 
Chief Medical Officer [employer] *:  certain medications cannot be prescribed via [employer] Providers Seen During Your Hospitalization Provider Specialty Primary office phone Jo-Ann Rivear MD Emergency Medicine [phone] Timbo Marquez MD Hospitalist [phone] Filipe Lopez MD Internal Medicine [phone] Your Primary Care Physician (PCP) Primary Care Physician Office Phone Office Fax Elías Quinonez [phone] You are allergic to the following Allergen Reactions Penicillins Itching Recent Documentation Height Weight BMI Smoking Status 1.778 m 89.7 kg 28.37 kg/m2 Never Smoker Emergency Contacts Name Discharge Info Relation Home Work Mobile Marlo Lobato DISCHARGE CAREGIVER [3] Child [2] [phone] Patient Belongings The following personal items are in your possession at time of discharge: 
  Dental Appliances: With patient  Visual Aid: Glasses      Home Medications: None   Jewelry: None  Clothing: At bedside    Other Valuables: At bedside (hanging in closet) Please provide this summary of care documentation to your next provider.  
  
  
 
  
Signatures-by signing, you are acknowledging that this After Visit Summary has been reviewed with you and you have received a copy. Patient Signature:  ____________________________________________________________ Date:  ____________________________________________________________  
  
English Pih Provider Signature:  ____________________________________________________________ Date:  ____________________________________________________________

## 2018-07-11 NOTE — ED NOTES
Change in position has not helped with pain per pt. Went to flush IV before hydralazine given, IV infiltrated. IV removed and new IV access in progress.

## 2018-07-11 NOTE — ED TRIAGE NOTES
Patient comes to the ER c/o dizziness and weakness since 2am. Also reports bilateral flank pain. Patient has problems with kidneys. Denies urinary symptoms.

## 2018-07-11 NOTE — PROGRESS NOTES
Spiritual Care Assessment/Progress Note  ST. [address]      NAME: Imelda Spear MRN: [id number]  AGE: 80 y.o.  SEX: male  Tenriism Affiliation: Protestant   Language: English     7/11/2018     Total Time (in minutes): 25     Spiritual Assessment begun in [address] through conversation with:         [x]Patient        [] Family    [] Friend(s)        Reason for Consult: Advance medical directive consult     Spiritual beliefs: (Please include comment if needed)     [x] Identifies with a elaine tradition:         [] Supported by a elaine community:            [] Claims no spiritual orientation:           [] Seeking spiritual identity:                [] Adheres to an individual form of spirituality:           [] Not able to assess:                           Identified resources for coping:      [x] Prayer                               [] Music                  [] Guided Imagery     [x] Family/friends                 [] Pet visits     [] Devotional reading                         [] Unknown     [] Other:                                               Interventions offered during this visit: (See comments for more details)    Patient Interventions: Advance medical directive consult, Affirmation of emotions/emotional suffering, Affirmation of elaine, Catharsis/review of pertinent events in supportive environment, Coping skills reviewed/reinforced, Iconic (affirming the presence of God/Higher Power), Initial/Spiritual assessment, patient floor, Normalization of emotional/spiritual concerns, Prayer (actual), Prayer (assurance of), Tenriism beliefs/image of God discussed           Plan of Care:     [x] Support spiritual and/or cultural needs    [] Support AMD and/or advance care planning process      [] Support grieving process   [] Coordinate Rites and/or Rituals    [] Coordination with community clergy   [] No spiritual needs identified at this time   [] Detailed Plan of Care below (See Comments)  [] Make referral to Music Therapy  [] Make referral to Pet Therapy     [] Make referral to Addiction services  [] Make referral to [facility]  [] Make referral to Spiritual Care Partner  [] No future visits requested        [] Follow up visits as needed     Comments: Request by patient, through , to assist with advance medical directive. Consulted with patients chart. Explained document to patient, who was present in the room. Pt would prefer to wait for his daughter to arrive before he discusses his AMD. Chaplains will follow up for support as needed. Additionally,  provided a listening presence allowing pt to share about his medical concerns, his [place] elaine, and how his prayer life has been important for him.  provided prayer by bedside.      [phone] Peggy Nicholson M.Div, M.S, Tammy 607 available at [phone])

## 2018-07-11 NOTE — PROGRESS NOTES
Date of previous inpatient admission/ ED visit? 6/18/18 ED    What brought the patient back to ED? Patient presents to the ED w/ dizziness    Did patient decline recommended services during last admission/ ED visit (if yes, what)? No -  services referral by [facility]ED/CM on 6/19/18 to [facility] (RN/PT). Patient is active w/ agency    Has patient seen a provider since their last inpatient admission/ED visit (if yes, when)? Yes - patient states seen by PCP 1-2 weeks ago    CM Interventions:  From previous inpatient admission/ED visit: Assessment  From current inpatient admission/ED visit: Assessment    EMR reviewed. History significant for CAD and HTN. Patient presents w/ dizziness. Noted SSED/CM after hours intervention from 6/18/18 encounter. New Nikki referral sent and discussion made w/ daughter regarding patient living at home alone expressed patient would not consider AL. Daughter to follow up regarding MOW application. CM met w/patient and introduced to role.  acknowledged symptoms of dizziness, N/V and back pain. Patient lives 1 story home alone and has 3 daughters Obed Astudillo (primary support), Bhavana Yates, and Nano.  states Kj Najera is POA . Daughter lives 1 mile from home and visits daily and prepares meals at his home. No reported falls. Kj Najera arrived during CM visit. Patient verbalizes has Will however Jignesh Ga has passed away. CM discussed AMD - patient and daughter in agreement to have document completed at [facility]. Informed this writer will communicate w/ Pastoral Care. Call placed to Department informed of request and Hospitalist consult. Department will follow. Updates provided to patient /daughter - additional resources provided including [facility]. [facility] Medicare A/B and [employer] are [employer]. [employer] [employer]) is local pharmacy provider. Care Management Interventions  PCP Verified by CM:  Yes  Last Visit to PCP: 07/04/18  Palliative Care Criteria Met (RRAT>21 & CHF Dx)?: No  Transition of Care Consult (CM Consult):  Other (assessment)  MyChart Signup: No  Discharge Durable Medical Equipment: No  Health Maintenance Reviewed: Yes  Physical Therapy Consult: No  Occupational Therapy Consult: No  Speech Therapy Consult: No  Current Support Network: Lives Alone  Confirm Follow Up Transport:  (TBD)  Plan discussed with Pt/Family/Caregiver: Yes   Resource Information Provided?: No  Discharge Location  Discharge Placement:  (TBD)

## 2018-07-11 NOTE — PROGRESS NOTES
Admission Medication Reconciliation:    Information obtained from: This medication history was obtained from the patient. He brought a list of medications to the hospital.  It was a bit outdated with regards to doses, but an Rx Query is available. I confirmed updated doses with the patient's daughter. Summary:     Medications added: omeprazole  Medications deleted: beclomethasone (QVAR), diclofenac gel, diltiazem   Dose changes: NONE    Inpatient orders were reviewed and no changes are needed. Chief Complaint for this Admission:  dizziness     Significant PMH/Disease States:   Past Medical History:   Diagnosis Date    CAD (coronary artery disease)     Heart failure ([facility] [address]     Hypertension        Allergies:  Penicillins    Prior to Admission Medications:   Prior to Admission Medications   Prescriptions Last Dose Informant Patient Reported? Taking?   amiodarone (CORDARONE) 200 mg tablet 7/11/2018 at am  Yes Yes   Sig: Take 100 mg by mouth daily. (dose = 0.5 x 200 mg tablet)   apixaban (ELIQUIS) 2.5 mg tablet 7/11/2018 at am  Yes Yes   Sig: Take 2.5 mg by mouth two (2) times a day. atorvastatin (LIPITOR) 80 mg tablet 7/11/2018 at hs  Yes Yes   Sig: Take 80 mg by mouth nightly. bumetanide (BUMEX) 1 mg tablet   Yes No   Sig: Take 0.5 mg by mouth daily. (dose = 0.5 x 1 mg tablet)   carvedilol (COREG) 6.25 mg tablet 7/11/2018 at am  Yes Yes   Sig: Take 6.25 mg by mouth two (2) times daily (with meals). omeprazole (PRILOSEC) 40 mg capsule 7/11/2018 at am  Yes Yes   Sig: Take 40 mg by mouth Daily (before breakfast). potassium chloride (KLOR-CON) 10 mEq tablet 7/11/2018 at am  Yes Yes   Sig: Take 1 Cap by mouth daily. 1 cap with fluid pill      Facility-Administered Medications: None         Thank you for allowing me to participate in the care of this patient. Please contact the pharmacy () or the medication reconciliation pharmacist () with any questions. Светлана Mcginnis, Pharm. D., BCPS, BCPPS

## 2018-07-12 ENCOUNTER — APPOINTMENT (OUTPATIENT)
Dept: ULTRASOUND IMAGING | Age: 83
DRG: 683 | End: 2018-07-12
Attending: HOSPITALIST
Payer: MEDICARE

## 2018-07-12 LAB
CHOLEST SERPL-MCNC: 153 MG/DL
EST. AVERAGE GLUCOSE BLD GHB EST-MCNC: 111 MG/DL
FOLATE SERPL-MCNC: 14.2 NG/ML (ref 5–21)
HBA1C MFR BLD: 5.5 % (ref 4.2–6.3)
HDLC SERPL-MCNC: 92 MG/DL
HDLC SERPL: 1.7 {RATIO} (ref 0–5)
LDLC SERPL CALC-MCNC: 51.6 MG/DL (ref 0–100)
LIPID PROFILE,FLP: NORMAL
TRIGL SERPL-MCNC: 47 MG/DL (ref ?–150)
VIT B12 SERPL-MCNC: 286 PG/ML (ref 193–986)
VLDLC SERPL CALC-MCNC: 9.4 MG/DL

## 2018-07-12 PROCEDURE — 74011250637 HC RX REV CODE- 250/637: Performed by: HOSPITALIST

## 2018-07-12 PROCEDURE — 76770 US EXAM ABDO BACK WALL COMP: CPT

## 2018-07-12 PROCEDURE — 74011250636 HC RX REV CODE- 250/636: Performed by: HOSPITALIST

## 2018-07-12 PROCEDURE — 74011250637 HC RX REV CODE- 250/637: Performed by: NURSE PRACTITIONER

## 2018-07-12 PROCEDURE — 97116 GAIT TRAINING THERAPY: CPT | Performed by: PHYSICAL THERAPIST

## 2018-07-12 PROCEDURE — 97530 THERAPEUTIC ACTIVITIES: CPT

## 2018-07-12 PROCEDURE — 93880 EXTRACRANIAL BILAT STUDY: CPT

## 2018-07-12 PROCEDURE — 93306 TTE W/DOPPLER COMPLETE: CPT

## 2018-07-12 PROCEDURE — 99218 HC RM OBSERVATION: CPT

## 2018-07-12 PROCEDURE — G8988 SELF CARE GOAL STATUS: HCPCS

## 2018-07-12 PROCEDURE — G8987 SELF CARE CURRENT STATUS: HCPCS

## 2018-07-12 PROCEDURE — 3331090001 HH PPS REVENUE CREDIT

## 2018-07-12 PROCEDURE — 3331090002 HH PPS REVENUE DEBIT

## 2018-07-12 PROCEDURE — 97161 PT EVAL LOW COMPLEX 20 MIN: CPT | Performed by: PHYSICAL THERAPIST

## 2018-07-12 PROCEDURE — 97165 OT EVAL LOW COMPLEX 30 MIN: CPT

## 2018-07-12 RX ORDER — CARVEDILOL 12.5 MG/1
25 TABLET ORAL 2 TIMES DAILY WITH MEALS
Status: DISCONTINUED | OUTPATIENT
Start: 2018-07-12 | End: 2018-07-14 | Stop reason: HOSPADM

## 2018-07-12 RX ORDER — SODIUM CHLORIDE, SODIUM LACTATE, POTASSIUM CHLORIDE, CALCIUM CHLORIDE 600; 310; 30; 20 MG/100ML; MG/100ML; MG/100ML; MG/100ML
75 INJECTION, SOLUTION INTRAVENOUS CONTINUOUS
Status: DISPENSED | OUTPATIENT
Start: 2018-07-12 | End: 2018-07-13

## 2018-07-12 RX ADMIN — CARVEDILOL 25 MG: 12.5 TABLET, FILM COATED ORAL at 17:47

## 2018-07-12 RX ADMIN — HYDRALAZINE HYDROCHLORIDE 10 MG: 20 INJECTION INTRAMUSCULAR; INTRAVENOUS at 21:14

## 2018-07-12 RX ADMIN — CARVEDILOL 12.5 MG: 12.5 TABLET, FILM COATED ORAL at 08:31

## 2018-07-12 RX ADMIN — SODIUM CHLORIDE, SODIUM LACTATE, POTASSIUM CHLORIDE, AND CALCIUM CHLORIDE 75 ML/HR: 600; 310; 30; 20 INJECTION, SOLUTION INTRAVENOUS at 09:39

## 2018-07-12 RX ADMIN — Medication 10 ML: at 17:47

## 2018-07-12 RX ADMIN — ATORVASTATIN CALCIUM 80 MG: 40 TABLET, FILM COATED ORAL at 21:19

## 2018-07-12 RX ADMIN — HYDRALAZINE HYDROCHLORIDE 10 MG: 20 INJECTION INTRAMUSCULAR; INTRAVENOUS at 07:07

## 2018-07-12 RX ADMIN — AMIODARONE HYDROCHLORIDE 100 MG: 200 TABLET ORAL at 08:30

## 2018-07-12 RX ADMIN — ONDANSETRON 4 MG: 2 INJECTION INTRAMUSCULAR; INTRAVENOUS at 07:07

## 2018-07-12 RX ADMIN — APIXABAN 2.5 MG: 2.5 TABLET, FILM COATED ORAL at 17:47

## 2018-07-12 RX ADMIN — Medication 10 ML: at 06:54

## 2018-07-12 RX ADMIN — APIXABAN 2.5 MG: 2.5 TABLET, FILM COATED ORAL at 08:31

## 2018-07-12 RX ADMIN — SODIUM CHLORIDE, SODIUM LACTATE, POTASSIUM CHLORIDE, AND CALCIUM CHLORIDE 75 ML/HR: 600; 310; 30; 20 INJECTION, SOLUTION INTRAVENOUS at 21:14

## 2018-07-12 NOTE — CONSULTS
Cardiology Consult dictated # [id number]  Impressions:  Intractable nausea associated with the use of amiodarone  Ischemic and nonischemic CM: echo this admission shows EF 35% with diffuse global hypokinesis  CAD: s/p remote stenting. No clinical suspicion here for active coronary insufficiency  With his intractable nausea, he may have gastroparesis  He has been weak and dizzy and ?  Postural for a few months  Recommendations:  Discontinue the amiodarone and if he reverts to atrial fib/flutter would aim for rate control  Sotalol is not a great option with systolic HF, advanced renal insufficiency, CAD  Detailed orthostatic BP checks if not already done  Consider a gastric emptying study  Echo done and no need for any other cardiac studies  Further recommendations to follow  Thank you for this referral  Darius Alarcon MD

## 2018-07-12 NOTE — PROGRESS NOTES
Problem: Falls - Risk of  Goal: *Absence of Falls  Document Tisha Fall Risk and appropriate interventions in the flowsheet.    Outcome: Progressing Towards Goal  Fall Risk Interventions:  Mobility Interventions: Communicate number of staff needed for ambulation/transfer, OT consult for ADLs, Patient to call before getting OOB, PT Consult for mobility concerns, PT Consult for assist device competence, Strengthening exercises (ROM-active/passive), Utilize walker, cane, or other assistive device, Utilize gait belt for transfers/ambulation         Medication Interventions: Assess postural VS orthostatic hypotension, Evaluate medications/consider consulting pharmacy, Patient to call before getting OOB, Teach patient to arise slowly, Utilize gait belt for transfers/ambulation

## 2018-07-12 NOTE — PROGRESS NOTES
Problem: Pressure Injury - Risk of  Goal: *Prevention of pressure injury  Document Aurelio Scale and appropriate interventions in the flowsheet. Outcome: Progressing Towards Goal  Pressure Injury Interventions:             Activity Interventions: Increase time out of bed, Pressure redistribution bed/mattress(bed type), PT/OT evaluation    Mobility Interventions: Pressure redistribution bed/mattress (bed type), PT/OT evaluation    Nutrition Interventions: Document food/fluid/supplement intake, Offer support with meals,snacks and hydration

## 2018-07-12 NOTE — H&P
[address]   HISTORY AND PHYSICAL      Mariah Martines.Chevy.  MR#: [id number]  : 1930  ACCOUNT #: [de-identified]   ADMIT DATE: 2018    CHIEF COMPLAINT:  Lightheaded, dizziness, nausea, which has been going on for about probably like 1-2 months. HISTORY OF PRESENT ILLNESS:  75-year-old gentleman with history of AFib on Eliquis, coronary artery disease, ischemic cardiomyopathy with EF of 35%. As per PAMELA, 20% to 25%, done on 2017. Glaucoma with prosthetic left eye, hypertension, now presented with the above-mentioned chief complaint. As per daughter, his symptoms have been going on for about more than a month. He has been in and out of ER so many times. Patient complains of his dizziness getting worse, especially on getting up. He denies any fever, ringing in ears, or room spinning sensation. Patient also complains of some low back pain across bilateral flanks, more on left flank started yesterday. States it is 8/10, worsens with change in position. REVIEW OF SYSTEMS:  Patient denies fever, cough, short of breath, chest pain, abdominal pain, positive for constipation. Pertinent positives and negatives as mentioned above. ALLERGIES:  PENICILLIN    HOME MEDICATIONS:    1. Coreg 6.25 b.i.d.  2.  Prilosec 40 daily. 3.  Potassium chloride p.o. daily. 4.  Amiodarone 100 mg p.o. daily. 5.  Eliquis 2.5 p.o. b.i.d.  6.  Lipitor 80 p.o. at bedtime. 7.  Bumex 0.5 mg p.o. daily. PAST MEDICAL HISTORY:  1.  AFib/flutter on Eliquis. 2.  Systolic congestive heart failure with EF of 25% to 30% as per PAMELA. 3.  History of pacemaker. 4.  Hyperlipidemia. 5.  Glaucoma. 6.  Left prosthetic eye. 7.  Coronary artery disease. 8.  Hypertension. PAST SURGICAL HISTORY:  Pacemaker placement, hip replacement, and revision of dislocation. SOCIAL HISTORY:  Patient denies any alcohol or smoking.     FAMILY HISTORY:  Both parents ; had heart disease in both parents. CODE STATUS:  FULL CODE as per patient. Patient has advance medical directives. Patient's daughter, Ms. Jamari Anne, is a medical power of . PHYSICAL EXAMINATION:  VITAL SIGNS:  Temperature afebrile, heart rate 60-72, respiratory rate 8-22, blood pressure 179/84, heart rate 72, pulse ox 98% on room air. GENERAL:  Not in acute distress. HEENT:  Atraumatic, normocephalic. Left the prosthetic eye. Extraocular muscles intact. Moist mucous membranes. NECK:  Supple, no JVD. CARDIOVASCULAR:  S1, S2 within normal limits, regular rhythm. RESPIRATORY:  Clear to auscultation. No wheezing. GASTROINTESTINAL:  Bowel sounds present in all 4 quadrants. Soft, nontender, nondistended. LOWER EXTREMITIES:  No edema or cyanosis. NEUROLOGIC:  Alert, oriented x3. No focal motor deficits. DIAGNOSTIC DATA:  CBC unremarkable. Urine analysis unremarkable. Chemistry:  BUN 28, creatinine 2.24, which was 1.6 to 1.9 in 2017. Troponin less than 0.05. CT of head:  No acute intracranial abnormality. EKG:  Sinus rhythm with 1st-degree AV block. On EKG do not see any pacemaker rhythm. It appears more of a sinus rhythm. ASSESSMENT AND PLAN:  66-year-old gentleman with significant cardiac history, including coronary artery disease, atrial fibrillation, pacemaker, systolic congestive heart failure with ejection fraction of 25% to 30% as per previous echo, now presented with nausea, lightheaded, and dizziness. 1.  Lightheaded and dizziness with a differential diagnosis of posterior stroke, orthostatic hypotension, or could be dehydration, secondary to elevated BUN and creatinine. Will get MRI, fasting lipid panel, A1c. Will repeat echo, and will also get carotid ultrasound. Will get orthostatic vitals. Will hold his Bumex for dizziness and also for his DARRYN. 2.  Acute kidney injury on chronic kidney disease, stage III.   Will hold Bumex, will not give any IV fluids secondary to heart failure. Monitor renal function and respiratory status closely. Avoid nephrotoxic agents. If any worsening in renal function, will get renal ultrasound. 3.  History of atrial fibrillation. Currently in sinus rhythm. Will continue with his amiodarone and Eliquis. If needed, consider Cardiology consult, depending on the echo results. 4.  Coronary artery disease. Continue with Coreg, Lipitor. Patient is not on aspirin, likely secondary to being on Eliquis. 5.  Hypertension. His blood pressure is slightly on the higher side. Will increase his Coreg dose. At home he takes 6.25 b.i.d., will increase to 12.5 b.i.d., and continue with amiodarone and p.r.n. hydralazine. 6.  Deep venous thrombosis prophylaxis. Patient is on Eliquis.       MD KERWIN Meyers/BK  D: 07/11/2018 19:01     T: 07/11/2018 22:43  JOB #: [id number]

## 2018-07-12 NOTE — PROCEDURES
[facility]  *** FINAL REPORT ***    Name: Kristi Diaz  MRN: [id number]    Outpatient  : 1930  HIS Order #: [id number]  [id number] [facility] Visit #: [id number]  Date: 2018    TYPE OF TEST: Cerebrovascular Duplex    REASON FOR TEST  Transient ischemic attacks    Right Carotid:-             Proximal               Mid                 Distal  cm/s  Systolic  Diastolic  Systolic  Diastolic  Systolic  Diastolic  CCA:     17.5      11.0                            54.0      13.0  Bulb:  ECA:     46.0  ICA:     38.0      12.0       70.0      17.0       43.0      12.0  ICA/CCA:  0.7       0.9    ICA Stenosis:    Right Vertebral:-  Finding: Antegrade  Sys:       35.0  Ysabel:    Right Subclavian:    Left Carotid:-            Proximal                Mid                 Distal  cm/s  Systolic  Diastolic  Systolic  Diastolic  Systolic  Diastolic  CCA:     61.6      14.0                            42.0       6.0  Bulb:  ECA:     54.0  ICA:     69.0      14.0       56.0      14.0       40.0      10.0  ICA/CCA:  1.6       2.3    ICA Stenosis:    Left Vertebral:-  Finding: Antegrade  Sys:       37.0  Ysabel:    Left Subclavian:    INTERPRETATION/FINDINGS  PROCEDURE:  Color duplex ultrasound imaging of extracranial  cerebrovascular arteries. FINDINGS:       Right:  Internal carotid velocity is within normal limits. There   is narrowing of the internal carotid flow channel on color Doppler  imaging and mixed density plaque on B-mode imaging, consistent with  less than 50 percent stenosis. The common and external carotid  arteries are patent and without evidence of hemodynamically  significant stenosis. Left: Internal carotid velocity is within normal limits. There  is narrowing of the internal carotid flow channel on color Doppler  imaging and calcific plaque on B-mode imaging, consistent with less  than 50 percent stenosis.   The common and external carotid arteries  are patent and without evidence of hemodynamically significant  stenosis. IMPRESSION:  Consistent with less than 50% stenosis of the internal  carotids. Vertebrals are patent with antegrade flow. ADDITIONAL COMMENTS    I have personally reviewed the data relevant to the interpretation of  this  study.     TECHNOLOGIST: Sofia Hussein RVT  Signed: 07/12/2018 11:01 AM    PHYSICIAN: Benjamin Franks MD  Signed: 07/13/2018 07:28 AM

## 2018-07-12 NOTE — CONSULTS
[address] [place] [address].Alexander  MR#: [id number]  : 1930  ACCOUNT #: [de-identified]   DATE OF SERVICE: 2018    HISTORY OF PRESENT ILLNESS:  This is an 71-year-old man well known to me. He has chronic recurrent atrial fibrillation and cardioversion a few times and has been on amiodarone for a while. His amiodarone has been cut back significantly. However, he has intractable nausea which he associates with taking his pill. The patient presents with many other complaints for this admission, but denies chest pain, syncope, any bleeding per urine or stool, any coughing, wheezing or hemoptysis, any fever or chills. He has coronary artery disease, hypertension and sinoatrial dysfunction, status post permanent pacemaker implantation. His ejection fraction during this admission is 35% with moderate diffuse hypokinesis. MEDICATIONS:  At home are as follows:  Carvedilol 6.25 twice a day, omeprazole 40 mg daily, potassium 10 mEq daily, amiodarone 100 mg daily, apixaban 2.5 mg daily, atorvastatin 80 mg daily, bumetanide 0.5 mg daily. ALLERGIES:  PENICILLINS. REVIEW OF SYSTEMS:  Otherwise, unremarkable. PHYSICAL EXAMINATION:  GENERAL:  This is a well-developed, alert, articulate 71-year-old man. HEENT:  Remarkable for an artificial left eye. NECK:  Supple, no adenopathy. LUNGS:  Decreased breath sounds, but clear. HEART:  Regular moderate rhythm. S4 gallop. No S3. No friction rub. No neck vein distention. ABDOMEN:  Soft, nontender, no bruits, no signs of palpable masses. NEUROLOGIC:  Nonfocal.  Patient is awake, alert and appropriate. EXTREMITIES:  No edema. LABORATORY DATA:  Hemogram is normal.  Chemistry:  BUN 28, creatinine 2.24, potassium 4.3. Troponin was less than 0.05. EKG demonstrates sinus rhythm with underlying pacing intermittently. IMPRESSION:   1.   The patient has intractable nausea that he associates with his amiodarone. 2.  The patient has symptoms suggestive of orthostasis. 3.  The patient has coronary disease and diffuse hypokinesis of the left ventricle with an ejection fraction of 35%. 4.  He has chronic kidney disease. Creatinine is in the 2.2 range. RECOMMENDATIONS:    1. Discontinue amiodarone. 2.  If he has recurrence of his atrial fibrillation or atrial flutter would opt for rate control. 3.  If not done, he should have postural blood pressure checks and if he does turn out to be orthostatic then this should be treated. 4.  A lot of his concerns are fairly vague, may be related to gastroparesis.   This to be evaluated by the hospitalist team.    Thank you for this referral.      MD ALEXIS Frias / BECKY  D: 07/12/2018 17:44     T: 07/12/2018 18:21  JOB #: [id number]

## 2018-07-12 NOTE — PROGRESS NOTES
Amandeep Leigh MD  
Phone/text: [phone] (7am to 7pm) After 7pm please call  for hospitalist on call Hospitalist Progress Note 7/12/2018 PCP:  Dr. Saadia Gay MD 
Chief Complaint Patient presents with  Dizziness Admission Summary:  
80-year-old gentleman with history of AFib on Eliquis, coronary artery disease, ischemic cardiomyopathy with EF of 35%. As per PAMELA, 20% to 25%, done on 09/2017. Glaucoma with prosthetic left eye, hypertension, now presented with lightheaded, dizziness, nausea, which has been going on for about probably like 1-2 months. Reason For Visit: 
Dizziness, DARRYN Assessment/Plan Lightheaded and dizziness (POA) - unfortunately this is a pretty broad differential 
- CT head 7/11 with chronic small vessel ischemic disease but no acute process - MRI, MRA brain pending - Checking echo - Monitor orthostatic vitals - Gentle IVF for the next day or so 
- PT/OT Acute kidney injury on chronic kidney disease, stage III (POA) - not much improvement 
- Hold bumex - Gentle IVF as above - Obtain renal US Hypertension - BP pretty severely elevated but somewhat better now - Increase coreg - Check orthostatics 
- PRN hydralazine Chronic systolic CHF - difficult to tell NYHA due to back pain, dizziness - Checking echo - Increase coreg. No ACE-I/ARB due to renal dysfunction 
- Holding bumex as above Paroxysmal atrial fibrillation s/p pacemaker (chronic) - currently in sinus rhythm 
- Continue coreg, amioderone - Continue apixaban Nausea - patient notes continued nausea when he takes his amioderone. He has cut down with his home cardiologist but he still gets nausea. Would sotalol be a reasonable alternative? I would like to ask cardiology to weigh in. Coronary artery disease (chronic) - no signs of ischemia 
- Continue home Coreg, Lipitor - Patient is not on aspirin, likely secondary to being on Eliquis.  
 
See orders for other plans. VTE prophylaxis: apixaban Discussed plan of care with Patient/Family and Nurse Pre-admission lived at home Discharge plan: home Estimated time to discharge: unclear Subjective Mr. Katerina Andre is feeling nauseated. He was doing all right overnight but notes nausea every day when he takes his amioderone. He is not dizzy currently but feels like he would have difficulty walking. He has some continued left flank/back pain. Reviewed interval history Physical examination Visit Vitals  /79 (BP 1 Location: Right arm, BP Patient Position: At rest)  Pulse 60  Temp 97.3 °F (36.3 °C)  Resp 20  
 Ht 5' 10\" (1.778 m)  Wt 88.3 kg (194 lb 10.7 oz)  SpO2 98%  BMI 27.93 kg/m2 Temp (24hrs), Av.7 °F (36.5 °C), Min:97.3 °F (36.3 °C), Max:98 °F (36.7 °C) Intake/Output Summary (Last 24 hours) at 18 5791 Last data filed at 18 7654 Gross per 24 hour Intake              620 ml Output             1675 ml Net            -1055 ml Gen - NAD HEENT - MMM, left prosthetic eye Neck - supple, full ROM 
CV - RRR, 2/6 systolic murmur Resp - lungs CTA b/l, no wheezing, normal WOB 
GI - abdomen S, NT, ND, +BS. No hepatosplenomegaly  - mild left CVA tenderness, bladder non-palpable in lower abdomen MSK - normal tone and bulk, no edema Neuro - A&O, no focal deficits Psych - calm and cooperative with normal affect Data Review Telemetry x  
ECG x Xray CT scan x MRI Echocardiogram   
Ultrasound I have reviewed the flow sheet and recent notes New labs and data personally reviewed. Recent Labs  
   18 
 1038 WBC  7.0 HGB  12.5 HCT  39.2 PLT  170 Recent Labs  
   18 
 8970 NA  136  
K  4.3 CL  104 CO2  26 GLU  114* BUN  28* CREA  2.24* CA  9.1 ALB  3.1* TBILI  0.5 SGOT  14* ALT  14 Medications reviewed Current Facility-Administered Medications Medication Dose Route Frequency  amiodarone (CORDARONE) tablet 100 mg  100 mg Oral DAILY  apixaban (ELIQUIS) tablet 2.5 mg  2.5 mg Oral BID  atorvastatin (LIPITOR) tablet 80 mg  80 mg Oral QHS  sodium chloride (NS) flush 5-10 mL  5-10 mL IntraVENous Q8H  
 sodium chloride (NS) flush 5-10 mL  5-10 mL IntraVENous PRN  
 acetaminophen (TYLENOL) tablet 650 mg  650 mg Oral Q6H PRN  
 ondansetron (ZOFRAN) injection 4 mg  4 mg IntraVENous Q6H PRN  
 bisacodyl (DULCOLAX) tablet 5 mg  5 mg Oral DAILY PRN  
 carvedilol (COREG) tablet 12.5 mg  12.5 mg Oral BID WITH MEALS  
 hydrALAZINE (APRESOLINE) 20 mg/mL injection 10 mg  10 mg IntraVENous Q6H PRN Bebeto Mccarthy MD 
Internal Medicine 7/12/2018

## 2018-07-12 NOTE — PROGRESS NOTES
Problem: Self Care Deficits Care Plan (Adult)  Goal: *Acute Goals and Plan of Care (Insert Text)  Occupational Therapy Goals  Initiated 7/12/2018  1. Patient will perform grooming standing at sink with supervision/set-up within 7 day(s). 2.  Patient will perform lower body dressing with supervision/set-up within 7 day(s). 3.  Patient will perform item retrieval at RW level with supervision/set-up within 7 day(s). 4.  Patient will perform toilet transfers with supervision/set-up within 7 day(s). 5.  Patient will perform all aspects of toileting with supervision/set-up within 7 day(s). 6.  Patient will participate in upper extremity therapeutic exercise/activities with supervision/set-up for 15 minutes within 7 day(s). Occupational Therapy EVALUATION  Patient: Crow Boggs Sr. (80 y.o. male)  Date: 7/12/2018  Primary Diagnosis: Dizziness        Precautions:     Fall, vision impaired(L eye blind)    ASSESSMENT :  Based on the objective data described below, the patient presents reports of dizziness and nausea x 1-2 months impacting his ability to complete ADL tasks. Nursing cleared for therapy. He lives home alone and daughter checks on him daily. Amb at RW level, denies falls. Daughter reports patient with good safety awareness to stop amb when he is dizzy. Reports pain in L posterior flank, nursing aware. Transport arriving at beginning of session, limiting progression. Patient positive for orthostatic hypotension however asymptomatic and denies dizziness. Total A for sock mgmt as patient uses sock tool at home. Amb at RW level to stretcher with min A with forward flexion pushing walker out forward. He reports pace is slower than his baseline. Recommend HH with support from daughter vs rehab depending on medical work up and progression in hospital.     Recommend next session for assessment and training with toileting tasks.       Vitals:    07/12/18 1030 07/12/18 1035 07/12/18 1040 07/12/18 1100   BP: 135/76 122/90 105/69    BP 1 Location: Left arm Left arm Left arm    BP Patient Position: Supine Sitting Standing    Pulse:       Resp:       Temp:    Comment: Pt off the floor for testing   SpO2:       Weight:       Height:         Patient will benefit from skilled intervention to address the above impairments. Patients rehabilitation potential is considered to be Fair  Factors which may influence rehabilitation potential include:   []             None noted  []             Mental ability/status  [x]             Medical condition  []             Home/family situation and support systems  []             Safety awareness  []             Pain tolerance/management  []             Other:      PLAN :  Recommendations and Planned Interventions:  [x]               Self Care Training                  [x]        Therapeutic Activities  [x]               Functional Mobility Training    []        Cognitive Retraining  [x]               Therapeutic Exercises           [x]        Endurance Activities  [x]               Balance Training                   []        Neuromuscular Re-Education  []               Visual/Perceptual Training     [x]   Home Safety Training  [x]               Patient Education                 [x]        Family Training/Education  []               Other (comment):    Frequency/Duration: Patient will be followed by occupational therapy 5 times a week to address goals.   Discharge Recommendations: Home Health vs rehab  Further Equipment Recommendations for Discharge: TBD with progression, anticipate none for OT     SUBJECTIVE:   Patient stated See Aguilera will tryjane.    OBJECTIVE DATA SUMMARY:   HISTORY:   Past Medical History:   Diagnosis Date    CAD (coronary artery disease)     Heart failure ([address]     Hypertension      Past Surgical History:   Procedure Laterality Date    CARDIAC SURG PROCEDURE UNLIST      cath w/stent    HX ORTHOPAEDIC      bilateral hip replacement  HX PACEMAKER         Prior Level of Function/Environment/Context: Home alone. Dtr checks on him daily. Amb with RW. Mod indep ADL tasks with sock aide. Denies falls at home. Expanded or extensive additional review of patient history:     Home Situation  Home Environment: Private residence  One/Two Story Residence: One story  Living Alone: Yes  Support Systems: Child(gale), Family member(s)  Patient Expects to be Discharged to[de-identified] Private residence  Current DME Used/Available at Home: Walker, rolling    Hand dominance: Right    EXAMINATION OF PERFORMANCE DEFICITS:  Cognitive/Behavioral Status:  Neurologic State: Alert        Skin: BUE visible intact    Edema: uppers none    Hearing: Auditory  Auditory Impairment: None    Vision/Perceptual:       L eye blind  R eye intact, good vision              Range of Motion:  BUE: mild impairment, functional                Strength:  BUE: mild impairment, functional          Coordination:   Fine and gross motor: intact            Tone & Sensation:  BUE intact        Balance:  Sitting: Intact  Standing: With support  Standing - Static: Fair  Standing - Dynamic : Fair    Functional Mobility and Transfers for ADLs:  Bed Mobility:       Transfers:  Sit to Stand: Minimum assistance  Stand to Sit: Minimum assistance  Bed to Chair: Minimum assistance    ADL Assessment:  Feeding: Independent    Oral Facial Hygiene/Grooming: Independent    Bathing: Moderate assistance    Upper Body Dressing: Setup    Lower Body Dressing: Minimum assistance;Maximum assistance    Toileting:  (not assessed)                ADL Intervention and task modifications:     Assessed BP with changing positions. Amb to hallway for stretcher for transport. Lower Body Dressing Assistance  Socks:  Total assistance (dependent)      Functional Measure:  Barthel Index:    Bathin  Bladder: 5  Bowels: 5  Groomin  Dressin  Feeding: 10  Mobility: 0  Stairs: 0  Toilet Use: 5  Transfer (Bed to Chair and Back): 10  Total: 40       Barthel and G-code impairment scale:  Percentage of impairment CH  0% CI  1-19% CJ  20-39% CK  40-59% CL  60-79% CM  80-99% CN  100%   Barthel Score 0-100 100 99-80 79-60 59-40 20-39 1-19   0   Barthel Score 0-20 20 17-19 13-16 9-12 5-8 1-4 0      The Barthel ADL Index: Guidelines  1. The index should be used as a record of what a patient does, not as a record of what a patient could do. 2. The main aim is to establish degree of independence from any help, physical or verbal, however minor and for whatever reason. 3. The need for supervision renders the patient not independent. 4. A patient's performance should be established using the best available evidence. Asking the patient, friends/relatives and nurses are the usual sources, but direct observation and common sense are also important. However direct testing is not needed. 5. Usually the patient's performance over the preceding 24-48 hours is important, but occasionally longer periods will be relevant. 6. Middle categories imply that the patient supplies over 50 per cent of the effort. 7. Use of aids to be independent is allowed. La Mendoza., Barthel, D.W. (2836). Functional evaluation: the Barthel Index. [facility] (14)2. LEORA Mcduffie, Eli Ramon., Critical access hospital.[facility], [address] [place] [address] (1999). Measuring the change indisability after inpatient rehabilitation; comparison of the responsiveness of the Barthel Index and Functional Junction Measure. Journal of Neurology, Neurosurgery, and Psychiatry, 66(4), 152-281. Bebeto Helton, N.J.A, BRUCE Ornelas, & Soo Garcia MSachinA. (2004.) Assessment of post-stroke quality of life in cost-effectiveness studies: The usefulness of the Barthel Index and the EuroQoL-5D. Quality of Life Research, 13, 324-44         G codes:   In compliance with CMSs Claims Based Outcome Reporting, the following G-code set was chosen for this patient based on their primary functional limitation being treated: The outcome measure chosen to determine the severity of the functional limitation was the Barthel Index with a score of 40/100 which was correlated with the impairment scale. ? Self Care:     - CURRENT STATUS: CK - 40%-59% impaired, limited or restricted    - GOAL STATUS: CJ - 20%-39% impaired, limited or restricted    - D/C STATUS:  ---------------To be determined---------------     Occupational Therapy Evaluation Charge Determination   History Examination Decision-Making   LOW Complexity : Brief history review  LOW Complexity : 1-3 performance deficits relating to physical, cognitive , or psychosocial skils that result in activity limitations and / or participation restrictions  LOW Complexity : No comorbidities that affect functional and no verbal or physical assistance needed to complete eval tasks       Based on the above components, the patient evaluation is determined to be of the following complexity level: LOW   Pain:  Pain Scale 1: Numeric (0 - 10)  Pain Intensity 1: 0      Activity Tolerance:   Vitals:    07/12/18 1030 07/12/18 1035 07/12/18 1040 07/12/18 1100   BP: 135/76 122/90 105/69    BP 1 Location: Left arm Left arm Left arm    BP Patient Position: Supine Sitting Standing    Pulse:       Resp:       Temp:    Comment: Pt off the floor for testing   SpO2:       Weight:       Height:           After treatment: left on transport stretcher  [] Patient left in no apparent distress sitting up in chair  [] Patient left in no apparent distress in bed  [] Call bell left within reach  [] Nursing notified  [] Caregiver present  [] Bed alarm activated    COMMUNICATION/EDUCATION:   The patients plan of care was discussed with: Physical Therapist, Registered Nurse and Patient's daughter.  [] Home safety education was provided and the patient/caregiver indicated understanding. [] Patient/family have participated as able in goal setting and plan of care.   [] Patient/family agree to work toward stated goals and plan of care. [] Patient understands intent and goals of therapy, but is neutral about his/her participation. [] Patient is unable to participate in goal setting and plan of care. This patients plan of care is appropriate for delegation to JOSE.     Thank you for this referral.  Maia Jin, OT

## 2018-07-13 ENCOUNTER — HOME CARE VISIT (OUTPATIENT)
Dept: HOME HEALTH SERVICES | Facility: HOME HEALTH | Age: 83
End: 2018-07-13
Payer: MEDICARE

## 2018-07-13 ENCOUNTER — APPOINTMENT (OUTPATIENT)
Dept: GENERAL RADIOLOGY | Age: 83
DRG: 683 | End: 2018-07-13
Attending: HOSPITALIST
Payer: MEDICARE

## 2018-07-13 LAB
ANION GAP SERPL CALC-SCNC: 8 MMOL/L (ref 5–15)
BUN SERPL-MCNC: 32 MG/DL (ref 6–20)
BUN/CREAT SERPL: 16 (ref 12–20)
CALCIUM SERPL-MCNC: 8.9 MG/DL (ref 8.5–10.1)
CHLORIDE SERPL-SCNC: 106 MMOL/L (ref 97–108)
CO2 SERPL-SCNC: 27 MMOL/L (ref 21–32)
CREAT SERPL-MCNC: 1.98 MG/DL (ref 0.7–1.3)
GLUCOSE SERPL-MCNC: 99 MG/DL (ref 65–100)
POTASSIUM SERPL-SCNC: 4.5 MMOL/L (ref 3.5–5.1)
SODIUM SERPL-SCNC: 141 MMOL/L (ref 136–145)

## 2018-07-13 PROCEDURE — 3331090002 HH PPS REVENUE DEBIT

## 2018-07-13 PROCEDURE — 74011250637 HC RX REV CODE- 250/637: Performed by: HOSPITALIST

## 2018-07-13 PROCEDURE — 3331090001 HH PPS REVENUE CREDIT

## 2018-07-13 PROCEDURE — 80048 BASIC METABOLIC PNL TOTAL CA: CPT | Performed by: HOSPITALIST

## 2018-07-13 PROCEDURE — 97535 SELF CARE MNGMENT TRAINING: CPT

## 2018-07-13 PROCEDURE — 71101 X-RAY EXAM UNILAT RIBS/CHEST: CPT

## 2018-07-13 PROCEDURE — 99218 HC RM OBSERVATION: CPT

## 2018-07-13 PROCEDURE — 74011250636 HC RX REV CODE- 250/636: Performed by: HOSPITALIST

## 2018-07-13 PROCEDURE — 36415 COLL VENOUS BLD VENIPUNCTURE: CPT | Performed by: HOSPITALIST

## 2018-07-13 PROCEDURE — 74011250637 HC RX REV CODE- 250/637: Performed by: NURSE PRACTITIONER

## 2018-07-13 PROCEDURE — 65660000000 HC RM CCU STEPDOWN

## 2018-07-13 RX ORDER — LIDOCAINE 50 MG/G
1 PATCH TOPICAL EVERY 24 HOURS
Status: DISCONTINUED | OUTPATIENT
Start: 2018-07-13 | End: 2018-07-14 | Stop reason: HOSPADM

## 2018-07-13 RX ORDER — FAMOTIDINE 20 MG/1
20 TABLET, FILM COATED ORAL DAILY
Status: DISCONTINUED | OUTPATIENT
Start: 2018-07-13 | End: 2018-07-14 | Stop reason: HOSPADM

## 2018-07-13 RX ORDER — SODIUM CHLORIDE, SODIUM LACTATE, POTASSIUM CHLORIDE, CALCIUM CHLORIDE 600; 310; 30; 20 MG/100ML; MG/100ML; MG/100ML; MG/100ML
50 INJECTION, SOLUTION INTRAVENOUS CONTINUOUS
Status: DISPENSED | OUTPATIENT
Start: 2018-07-13 | End: 2018-07-14

## 2018-07-13 RX ADMIN — CARVEDILOL 25 MG: 12.5 TABLET, FILM COATED ORAL at 07:12

## 2018-07-13 RX ADMIN — ONDANSETRON 4 MG: 2 INJECTION INTRAMUSCULAR; INTRAVENOUS at 17:06

## 2018-07-13 RX ADMIN — CARVEDILOL 25 MG: 12.5 TABLET, FILM COATED ORAL at 17:06

## 2018-07-13 RX ADMIN — Medication 10 ML: at 22:26

## 2018-07-13 RX ADMIN — APIXABAN 2.5 MG: 2.5 TABLET, FILM COATED ORAL at 17:06

## 2018-07-13 RX ADMIN — SODIUM CHLORIDE, SODIUM LACTATE, POTASSIUM CHLORIDE, AND CALCIUM CHLORIDE 50 ML/HR: 600; 310; 30; 20 INJECTION, SOLUTION INTRAVENOUS at 13:18

## 2018-07-13 RX ADMIN — FAMOTIDINE 20 MG: 20 TABLET ORAL at 11:02

## 2018-07-13 RX ADMIN — ACETAMINOPHEN 650 MG: 325 TABLET ORAL at 22:25

## 2018-07-13 RX ADMIN — Medication 10 ML: at 16:36

## 2018-07-13 RX ADMIN — ATORVASTATIN CALCIUM 80 MG: 40 TABLET, FILM COATED ORAL at 22:25

## 2018-07-13 RX ADMIN — ACETAMINOPHEN 650 MG: 325 TABLET ORAL at 11:02

## 2018-07-13 RX ADMIN — APIXABAN 2.5 MG: 2.5 TABLET, FILM COATED ORAL at 09:13

## 2018-07-13 NOTE — PHYSICIAN ADVISORY
Letter of Status Determination:   Recommend hospitalization status upgraded from   OBSERVATION  to INPATIENT  Status     Pt Name:  Oakley Galeazzi   MR#   [facility] # [id number] /  [id number]   [facility]#  [id number]   Room and Hospital  663/01  @ [facility]   Hospitalization date  7/11/2018  9:05 AM   Current Attending Physician  Tamie Grace*   Principal diagnosis  Dizziness      Clinicals  80 y.o. y.o  male hospitalized with above diagnosis   This pt suffers from multiple chronic medical illnesses including but not limited to CAD, Chronic Afib, chronic anticoagulation,cardiomyopathy ischemic etc.   He presented with persistent dizziness and DARRYN. He is going to significant medication changes, and he is also receiving supportive care. Due to appropriate and necessary medical care, this pt's hospitalization has now exceeded two midnights . [facility] includes the [facility] ([facility]) criteria   Does  NOT apply    STATUS DETERMINATION  This patient is at above high risk of deterioration based on documented presenting clinical data, comorbid conditions, high risk of adverse events and current acute care course. Mr. Oakley Galeazzi. now meets Inpatient Admission status criteria in accordance with CMS regulation Section 43 .3. Specifically, due to medical necessity the patient's stay now exceeds Two Midnights. It is our recommendation that this patient's hospitalization status should be upgraded from  OBSERVATION to INPATIENT status.      The final decision of the patient's hospitalization status depends on the attending physician's judgment            Additional comments     Payor: Demetrius Ice / Plan: [place] MEDICARE PART A & B / Product Type: Medicare /         Brock Dinero MD  [facility]   [facility] [employer], [address] [place] [address]    Cell  [phone]        [phone]    .

## 2018-07-13 NOTE — PROGRESS NOTES
Problem: Falls - Risk of  Goal: *Absence of Falls  Document Tisha Fall Risk and appropriate interventions in the flowsheet. Fall Risk Interventions:  Mobility Interventions: Bed/chair exit alarm, Assess mobility with egress test, PT Consult for mobility concerns, PT Consult for assist device competence, OT consult for ADLs, Utilize walker, cane, or other assistive device         Medication Interventions: Teach patient to arise slowly, Patient to call before getting OOB                  Problem: Pressure Injury - Risk of  Goal: *Prevention of pressure injury  Document Aurleio Scale and appropriate interventions in the flowsheet. Pressure Injury Interventions:             Activity Interventions: PT/OT evaluation, Pressure redistribution bed/mattress(bed type)    Mobility Interventions: PT/OT evaluation    Nutrition Interventions: Discuss nutritional consult with provider, Offer support with meals,snacks and hydration

## 2018-07-13 NOTE — PROGRESS NOTES
Problem: Self Care Deficits Care Plan (Adult)  Goal: *Acute Goals and Plan of Care (Insert Text)  Occupational Therapy Goals  Initiated 7/12/2018  1. Patient will perform grooming standing at sink with supervision/set-up within 7 day(s). 2.  Patient will perform lower body dressing with supervision/set-up within 7 day(s). 3.  Patient will perform item retrieval at RW level with supervision/set-up within 7 day(s). 4.  Patient will perform toilet transfers with supervision/set-up within 7 day(s). 5.  Patient will perform all aspects of toileting with supervision/set-up within 7 day(s). 6.  Patient will participate in upper extremity therapeutic exercise/activities with supervision/set-up for 15 minutes within 7 day(s). Occupational Therapy TREATMENT  Patient: Leann Kraus  (80 y.o. male)  Date: 7/13/2018  Diagnosis: Dizziness  Dizziness Dizziness       Precautions: Fall  Chart, occupational therapy assessment, plan of care, and goals were reviewed. ASSESSMENT:  Patient cleared by RN to be seen for OT. Patient received in bed and agreeable to treatment. Patient CGA for functional mobility and bed mobility, SBA-CGA for lower body ADLs. Patient ADLs continue to be limited by BYRNES, impaired balance (note 1x minor LOB, self corrected), impaired functional activity tolerance, and generalized weakness. Patient would benefit from [facility] and increased assist from family when medically cleared for D/C. Recommend with nursing patient to complete as able in order to maintain strength, endurance and independence: ADLs with supervision/setup, OOB to chair 3x/day and mobilizing to the bathroom for toileting with 1-2 assist (2nd for line management). Thank you for your assistance.      Progression toward goals:  []       Improving appropriately and progressing toward goals  [x]       Improving slowly and progressing toward goals  []       Not making progress toward goals and plan of care will be adjusted PLAN:  Patient continues to benefit from skilled intervention to address the above impairments. Continue treatment per established plan of care. Discharge Recommendations:  Home Health  Further Equipment Recommendations for Discharge:  TBD - likely none     SUBJECTIVE:   Patient stated I may need to sit for a minute.  (referring to toileting)    OBJECTIVE DATA SUMMARY:   Cognitive/Behavioral Status:  Neurologic State: Alert  Orientation Level: Oriented X4  Cognition: Appropriate for age attention/concentration; Follows commands  Perception: Appears intact  Perseveration: No perseveration noted  Safety/Judgement: Awareness of environment; Fall prevention    Functional Mobility and Transfers for ADLs:  Bed Mobility:  Supine to Sit: Contact guard assistance    Transfers:  Sit to Stand: Contact guard assistance  Functional Transfers  Toilet Transfer : Contact guard assistance; Adaptive equipment  Cues: Verbal cues provided  Adaptive Equipment: Grab bars    Balance:  Sitting: Intact  Standing: Impaired; With support  Standing - Static: Good  Standing - Dynamic : Fair    ADL Intervention:  Grooming  Washing Hands: Stand-by assistance    Noted 1 x minor LOB while pulling pants up 2* pants being under heel, CGA to correct with good self righting reactions  Lower Body Dressing Assistance  Protective Undergarmet: Contact guard assistance  Pants With Elastic Waist: Contact guard assistance    Toileting  Bladder Hygiene: Supervision/set-up  Bowel Hygiene: Supervision/set-up  Clothing Management: Stand-by assistance    Cognitive Retraining  Safety/Judgement: Awareness of environment; Fall prevention    Pain:  Pain Scale 1: Numeric (0 - 10)  Pain Intensity 1: 5  Pain Location 1: Flank  Pain Orientation 1: Left  Pain Description 1: Aching  Pain Intervention(s) 1: Medication (see MAR)     Activity Tolerance:   Fair, VSS  Please refer to the flowsheet for vital signs taken during this treatment.   After treatment:   [] Patient left in no apparent distress sitting up in chair  [x] Patient left in no apparent distress in bed  [x] Call bell left within reach  [x] Nursing notified  [] Caregiver present  [] Bed alarm activated    COMMUNICATION/COLLABORATION:   The patients plan of care was discussed with: Registered Nurse    Nhi Barrientos OT  Time Calculation: 24 mins

## 2018-07-13 NOTE — PROGRESS NOTES
Patient states his pacemaker was placed at [facility], he may still have the card in his wallet and his daughter will be bringing his wallet today (07/13/2018).

## 2018-07-13 NOTE — PROGRESS NOTES
Renal Dosing/Monitoring  Medication: Pepcid   Current regimen:  20mg every 12 hr  Recent Labs      07/13/18   0339  07/11/18   0918   CREA  1.98*  2.24*   BUN  32*  28*     Estimated CrCl:  29 ml/min  Plan: Change to 20mg Q24h for CrCl <50ml/min.

## 2018-07-13 NOTE — PROGRESS NOTES
Bebeto Mccarthy MD  
Phone/text: [phone] (7am to 7pm) After 7pm please call  for hospitalist on call Hospitalist Progress Note 7/13/2018 PCP:  Dr. Osmin Beckman MD 
Chief Complaint Patient presents with  Dizziness Admission Summary:  
63-year-old gentleman with history of AFib on Eliquis, coronary artery disease, ischemic cardiomyopathy with EF of 35%. As per PAMELA, 20% to 25%, done on 09/2017. Glaucoma with prosthetic left eye, hypertension, now presented with lightheaded, dizziness, nausea, which has been going on for about probably like 1-2 months. Reason For Visit: 
Dizziness, DARRYN Assessment/Plan Lightheaded and dizziness (POA) - suspect dehydration with +orthostatics - CT head 7/11 with chronic small vessel ischemic disease but no acute process - No focal neurological symptoms, discontinue MRI 
- Echo 7/11 with LVEF 35%, moderate diffuse hypokinesis, PASP estimated at 44 mmHg - Monitor orthostatic vitals - Continue gentle IVF 
- PT/OT Acute kidney injury on chronic kidney disease, stage III (POA) - starting to improve - Renal US 7/12 without hydronephrosis, echogenic renal parenchyma consistent with medical renal disease - Continue to hold bumex - Gentle IVF as above Hypertension - BP starting to improve - Increased coreg 
- PRN hydralazine Chronic systolic CHF - difficult to tell NYHA due to back pain, dizziness - Echo as above - Increased coreg. No ACE-I/ARB due to renal dysfunction 
- Holding bumex as above Paroxysmal atrial fibrillation s/p pacemaker (chronic) - currently in sinus rhythm 
- Continue coreg - Stopped amioderone - Continue apixaban Nausea - still a little nauseated but better having avoided amiodarone this morning - Trial of pepcid Coronary artery disease (chronic) - no signs of ischemia 
- Continue home Coreg, Lipitor - Patient is not on aspirin, likely secondary to being on Eliquis.  
 
Left flank pain - pain over left ribs on the side, unclear cause - US as above - Obtain xray of left ribs - Trial of lidocaine patch See orders for other plans. VTE prophylaxis: apixaban Discussed plan of care with Patient/Family and Nurse Pre-admission lived at home Discharge plan: home Estimated time to discharge: tomorrow Subjective Mr. Georges Arnold is doing all right. A little nauseated this morning but better than yesterday. He has left flank pain, which he now tells me is constant. No SOB, chest pain, palpitations. Reviewed interval history Physical examination Visit Vitals  /73 Comment: after coreg  Pulse 62  Temp 98 °F (36.7 °C)  Resp 17  Ht 5' 10\" (1.778 m)  Wt 88.9 kg (196 lb)  SpO2 95%  BMI 28.12 kg/m2 Temp (24hrs), Av.9 °F (36.6 °C), Min:97.7 °F (36.5 °C), Max:98 °F (36.7 °C) Intake/Output Summary (Last 24 hours) at 18 1017 Last data filed at 18 0793 Gross per 24 hour Intake                0 ml Output             1025 ml Net            -1025 ml Gen - NAD HEENT - MMM, left prosthetic eye Neck - supple, full ROM 
CV - RRR, 2/6 systolic murmur Resp - lungs CTA b/l, no wheezing, normal WOB 
GI - abdomen S, NT, ND, +BS. No hepatosplenomegaly  - mild left CVA tenderness, bladder non-palpable in lower abdomen MSK - normal tone and bulk, no edema. Pain on palpation of left lower ribs on the flank Neuro - A&O, no focal deficits Psych - calm and cooperative with normal affect Data Review Telemetry x  
ECG Xray CT scan MRI Echocardiogram   
Ultrasound x I have reviewed the flow sheet and recent notes New labs and data personally reviewed. Recent Labs  
   18 
 5271 WBC  7.0 HGB  12.5 HCT  39.2 PLT  170 Recent Labs  
   18 
 0339  18 
 2809 NA  141  136  
K  4.5  4.3 CL  106  104 CO2  27  26 GLU  99  114* BUN  32*  28* CREA  1.98*  2.24* CA  8.9  9.1 ALB   --   3.1* TBILI   --   0.5 SGOT   --   14* ALT   --   14 Medications reviewed Current Facility-Administered Medications Medication Dose Route Frequency  carvedilol (COREG) tablet 25 mg  25 mg Oral BID WITH MEALS  
 apixaban (ELIQUIS) tablet 2.5 mg  2.5 mg Oral BID  atorvastatin (LIPITOR) tablet 80 mg  80 mg Oral QHS  sodium chloride (NS) flush 5-10 mL  5-10 mL IntraVENous Q8H  
 sodium chloride (NS) flush 5-10 mL  5-10 mL IntraVENous PRN  
 acetaminophen (TYLENOL) tablet 650 mg  650 mg Oral Q6H PRN  
 ondansetron (ZOFRAN) injection 4 mg  4 mg IntraVENous Q6H PRN  
 bisacodyl (DULCOLAX) tablet 5 mg  5 mg Oral DAILY PRN  
 hydrALAZINE (APRESOLINE) 20 mg/mL injection 10 mg  10 mg IntraVENous Q6H PRN Nida Leigh MD 
Internal Medicine 7/13/2018

## 2018-07-14 VITALS
WEIGHT: 197.75 LBS | DIASTOLIC BLOOD PRESSURE: 70 MMHG | HEIGHT: 70 IN | RESPIRATION RATE: 14 BRPM | TEMPERATURE: 98.2 F | OXYGEN SATURATION: 99 % | BODY MASS INDEX: 28.31 KG/M2 | HEART RATE: 62 BPM | SYSTOLIC BLOOD PRESSURE: 136 MMHG

## 2018-07-14 LAB
ANION GAP SERPL CALC-SCNC: 7 MMOL/L (ref 5–15)
BUN SERPL-MCNC: 33 MG/DL (ref 6–20)
BUN/CREAT SERPL: 16 (ref 12–20)
CALCIUM SERPL-MCNC: 8.8 MG/DL (ref 8.5–10.1)
CHLORIDE SERPL-SCNC: 105 MMOL/L (ref 97–108)
CO2 SERPL-SCNC: 24 MMOL/L (ref 21–32)
CREAT SERPL-MCNC: 2.03 MG/DL (ref 0.7–1.3)
GLUCOSE SERPL-MCNC: 94 MG/DL (ref 65–100)
POTASSIUM SERPL-SCNC: 4.8 MMOL/L (ref 3.5–5.1)
SODIUM SERPL-SCNC: 136 MMOL/L (ref 136–145)

## 2018-07-14 PROCEDURE — 74011250636 HC RX REV CODE- 250/636: Performed by: HOSPITALIST

## 2018-07-14 PROCEDURE — 36415 COLL VENOUS BLD VENIPUNCTURE: CPT | Performed by: HOSPITALIST

## 2018-07-14 PROCEDURE — 80048 BASIC METABOLIC PNL TOTAL CA: CPT | Performed by: HOSPITALIST

## 2018-07-14 PROCEDURE — 74011250637 HC RX REV CODE- 250/637: Performed by: HOSPITALIST

## 2018-07-14 PROCEDURE — 3331090001 HH PPS REVENUE CREDIT

## 2018-07-14 PROCEDURE — 3331090002 HH PPS REVENUE DEBIT

## 2018-07-14 RX ORDER — POLYETHYLENE GLYCOL 3350 17 G/17G
17 POWDER, FOR SOLUTION ORAL
Qty: 15 PACKET | Refills: 0 | Status: SHIPPED | OUTPATIENT
Start: 2018-07-14

## 2018-07-14 RX ORDER — POLYETHYLENE GLYCOL 3350 17 G/17G
17 POWDER, FOR SOLUTION ORAL DAILY
Status: DISCONTINUED | OUTPATIENT
Start: 2018-07-14 | End: 2018-07-14 | Stop reason: HOSPADM

## 2018-07-14 RX ORDER — CARVEDILOL 25 MG/1
25 TABLET ORAL 2 TIMES DAILY WITH MEALS
Qty: 60 TAB | Refills: 2 | Status: SHIPPED | OUTPATIENT
Start: 2018-07-14

## 2018-07-14 RX ORDER — LIDOCAINE 4 G/100G
PATCH TOPICAL
Qty: 15 PATCH | Refills: 0 | Status: SHIPPED | OUTPATIENT
Start: 2018-07-14

## 2018-07-14 RX ORDER — FAMOTIDINE 20 MG/1
20 TABLET, FILM COATED ORAL DAILY
Qty: 30 TAB | Refills: 2 | Status: SHIPPED | OUTPATIENT
Start: 2018-07-15 | End: 2018-07-14

## 2018-07-14 RX ORDER — BUMETANIDE 0.5 MG/1
0.25 TABLET ORAL DAILY
Qty: 30 TAB | Refills: 2 | Status: SHIPPED | OUTPATIENT
Start: 2018-07-14

## 2018-07-14 RX ADMIN — SODIUM CHLORIDE, SODIUM LACTATE, POTASSIUM CHLORIDE, AND CALCIUM CHLORIDE 50 ML/HR: 600; 310; 30; 20 INJECTION, SOLUTION INTRAVENOUS at 09:20

## 2018-07-14 RX ADMIN — POLYETHYLENE GLYCOL 3350 17 G: 17 POWDER, FOR SOLUTION ORAL at 10:46

## 2018-07-14 RX ADMIN — APIXABAN 2.5 MG: 2.5 TABLET, FILM COATED ORAL at 09:20

## 2018-07-14 RX ADMIN — FAMOTIDINE 20 MG: 20 TABLET ORAL at 09:20

## 2018-07-14 RX ADMIN — CARVEDILOL 25 MG: 12.5 TABLET, FILM COATED ORAL at 09:20

## 2018-07-14 RX ADMIN — Medication 10 ML: at 06:32

## 2018-07-14 RX ADMIN — ONDANSETRON 4 MG: 2 INJECTION INTRAMUSCULAR; INTRAVENOUS at 09:24

## 2018-07-14 RX ADMIN — HYDRALAZINE HYDROCHLORIDE 10 MG: 20 INJECTION INTRAMUSCULAR; INTRAVENOUS at 06:37

## 2018-07-14 NOTE — DISCHARGE INSTRUCTIONS
Please bring this form with you to show your primary care provider at your follow-up appointment. Primary care provider:  Dr. John Parikh MD    Discharging provider:  Nathan Bhatt MD    You have been admitted to the hospital with the following diagnoses:  · Dizziness    FOLLOW-UP CARE RECOMMENDATIONS:    APPOINTMENTS:  Follow-up Information     Follow up With Details Comments Lilly Joy III, MD Call As needed for primary care 8[address] [facility] 7 7[facility] [place]      Mai Hamilton MD Schedule an appointment as soon as possible for a visit in 2 weeks For follow up of heart failure [facility]  [phone]           SYMPTOMS to watch for: chest pain, shortness of breath, vomiting, diarrhea, change in mentation, falling, weakness, bleeding. DIET/what to eat:  Cardiac Diet    ACTIVITY:  Activity as tolerated and PT/OT per Home Health    What to do if new or unexpected symptoms occur? If you experience any of the above symptoms (or should other concerns or questions arise after discharge) please call your primary care physician. Return to the emergency room if you cannot get hold of your doctor. · It is very important that you keep your follow-up appointment(s). · Please bring discharge papers, medication list (and/or medication bottles) to your follow-up appointments for review by your outpatient provider(s). · Please check the list of medications and be sure it includes every medication (even non-prescription medications) that your provider wants you to take. · It is important that you take the medication exactly as they are prescribed. · Keep your medication in the bottles provided by the pharmacist and keep a list of the medication names, dosages, and times to be taken in your wallet. · Do not take other medications without consulting your doctor.    · If you have any questions about your medications or other instructions, please talk to your nurse or care provider before you leave the hospital.    I understand that if any problems occur once I am at home I am to contact my physician. These instructions were explained to me and I had the opportunity to ask questions.

## 2018-07-14 NOTE — PROGRESS NOTES
7/14/2018     Admit Date: 7/11/2018    Admit Diagnosis: Dizziness  Dizziness    Principal Problem:    Dizziness (7/11/2018)        Assessment/Plan:  Nausea is better now that amiodarone has been discontinued  He will likely lapse back into AF but we will manage him with life long AC and rate control  Can be discharged at any time at your discretion  Have him follow up with me in 2-3 weeks  Thanks     Subjective:  Feels better       Vinay Starring Sr. denies chest pain, syncope. Objective:     Visit Vitals    /65 (BP 1 Location: Right arm, BP Patient Position: At rest)    Pulse 64    Temp 98.4 °F (36.9 °C)    Resp 16    Ht 5' 10\" (1.778 m)    Wt 89.7 kg (197 lb 12 oz)    SpO2 98%    BMI 28.37 kg/m2        Physical Exam:  Neck: supple, symmetrical, trachea midline, no adenopathy, thyroid: not enlarged, symmetric, no tenderness/mass/nodules, no carotid bruit and no JVD  Heart: irregularly irregular rhythm, S1, S2 normal  Lungs: clear to auscultation bilaterally, normal percussion bilaterally  Abdomen: soft, non-tender.  Bowel sounds normal. No masses,  no organomegaly  Extremities: extremities normal, atraumatic, no cyanosis or edema  Pulses: 2+ and symmetric  Neurologic: Grossly normal      Labs:    Recent Labs      07/11/18   0918   CPK  94   TROIQ  <0.05     Recent Labs      07/14/18   0256   NA  136   K  4.8   CL  105   BUN  33*   CREA  2.03*   GLU  94   CA  8.8     Recent Labs      07/11/18   0918   WBC  7.0   HGB  12.5   HCT  39.2   PLT  170     Recent Labs      07/11/18   0918   CHOL  153   LDLC  51.6       Telemetry: normal sinus rhythm     Data Review: current meds, labs,recent radiology, intake/output/weight and problem list reviewed

## 2018-07-14 NOTE — DISCHARGE SUMMARY
Discharge Summary     Patient:  Kim Garcia. MRN: [id number]       YOB: 1930       Age: 80 y.o. Date of admission:  7/11/2018    Date of discharge:  7/14/2018    Primary care provider: Dr. Deisy Baron MD    Admitting provider:  Sybil Mendez MD    Discharging provider:  [address] [phone]. If unavailable, call [phone] and ask the  to page the triage hospitalist.    Consultations  · IP CONSULT TO CARDIOLOGY    Procedures  · * No surgery found *    Discharge destination: Home. The patient is stable for discharge. Admission diagnosis  · Dizziness  · Dizziness    Current Discharge Medication List      START taking these medications    Details   lidocaine (SALONPAS/ASPERCREME) 4 % patch Apply 1 patch to painful area for 12 hours daily and then remove for 12 hours. Can be obtained over-the-counter. Qty: 15 Patch, Refills: 0      polyethylene glycol (MIRALAX) 17 gram packet Take 1 Packet by mouth daily as needed. Qty: 15 Packet, Refills: 0         CONTINUE these medications which have CHANGED    Details   carvedilol (COREG) 25 mg tablet Take 1 Tab by mouth two (2) times daily (with meals). Qty: 60 Tab, Refills: 2      bumetanide (BUMEX) 0.5 mg tablet Take 0.5 Tabs by mouth daily. (dose = 0.5 x 1 mg tablet)  Qty: 30 Tab, Refills: 2         CONTINUE these medications which have NOT CHANGED    Details   omeprazole (PRILOSEC) 40 mg capsule Take 40 mg by mouth Daily (before breakfast). potassium chloride (KLOR-CON) 10 mEq tablet Take 1 Cap by mouth daily. 1 cap with fluid pill      apixaban (ELIQUIS) 2.5 mg tablet Take 2.5 mg by mouth two (2) times a day. atorvastatin (LIPITOR) 80 mg tablet Take 80 mg by mouth nightly.          STOP taking these medications       amiodarone (CORDARONE) 200 mg tablet Comments:   Reason for Stopping: Follow-up Information     Follow up With Details Comments 360 Kamila Joy III, MD Call As needed for primary care 865 [facility] 7 [facility] [place]      Kathy Buridck MD Schedule an appointment as soon as possible for a visit in 2 weeks For follow up of heart [facility]  [address]  [phone]            Final discharge diagnoses and brief hospital course  Please also refer to the admission H&P for details on the presenting problem. 80-year-old gentleman with history of AFib on Eliquis, coronary artery disease, ischemic cardiomyopathy with EF of 35%.  As per PAMELA, 20% to 25%, done on 09/2017. Albert Tesfaye with prosthetic left eye, hypertension, now presented with lightheaded, dizziness, nausea, which has been going on for about probably like 1-2 months.      Lightheaded and dizziness (POA) - suspect dehydration with +orthostatics, improved  - CT head 7/11 with chronic small vessel ischemic disease but no acute process  - No focal neurological symptoms, discontinue MRI  - Echo 7/11 with LVEF 35%, moderate diffuse hypokinesis, PASP estimated at 44 mmHg  - Monitor orthostatic vitals  - Given gentle IVF, reduce bumex dosage  - PT/OT     Acute kidney injury on chronic kidney disease, stage III (POA) - at baseline  - Renal US 7/12 without hydronephrosis, echogenic renal parenchyma consistent with medical renal disease  - Reduce bumex dose  - Gentle IVF as above     Hypertension - BP starting to improve  - Increased coreg  - PRN hydralazine     Chronic systolic CHF - difficult to tell NYHA due to back pain, dizziness  - Echo as above  - Increased coreg.  No ACE-I/ARB due to renal dysfunction  - Restart bumex at 1/2 dose  - Follow up with Cardiology      Paroxysmal atrial fibrillation s/p pacemaker (chronic) - currently in sinus rhythm  - Continue coreg  - Stopped amioderone   - Continue apixaban     Nausea - improving  - Continue PPI     Coronary artery disease (chronic) - no signs of ischemia  - Continue home Coreg, Lipitor  - Patient is not on aspirin, likely secondary to being on Eliquis.     Left flank pain - pain over left ribs on the side, unclear cause  - US as above  - Xray of left ribs 7/13 unremarkable  - Trial of lidocaine patch    FOLLOW-UP CARE RECOMMENDATIONS:    SYMPTOMS to watch for: chest pain, shortness of breath, vomiting, diarrhea, change in mentation, falling, weakness, bleeding. DIET/what to eat:  Cardiac Diet    ACTIVITY:  Activity as tolerated and PT/OT per Home Health    What to do if new or unexpected symptoms occur? If you experience any of the above symptoms (or should other concerns or questions arise after discharge) please call your primary care physician. Return to the emergency room if you cannot get hold of your doctor. · It is very important that you keep your follow-up appointment(s). · Please bring discharge papers, medication list (and/or medication bottles) to your follow-up appointments for review by your outpatient provider(s). · Please check the list of medications and be sure it includes every medication (even non-prescription medications) that your provider wants you to take. · It is important that you take the medication exactly as they are prescribed. · Keep your medication in the bottles provided by the pharmacist and keep a list of the medication names, dosages, and times to be taken in your wallet. · Do not take other medications without consulting your doctor.    · If you have any questions about your medications or other instructions, please talk to your nurse or care provider before you leave the hospital.    Physical examination at discharge  Visit Vitals    /65 (BP 1 Location: Right arm, BP Patient Position: At rest)    Pulse 64    Temp 98.4 °F (36.9 °C)    Resp 16    Ht 5' 10\" (1.778 m)    Wt 89.7 kg (197 lb 12 oz)    SpO2 98%    BMI 28.37 kg/m2 Gen - NAD  HEENT - MMM, left prosthetic eye  Neck - supple, full ROM  CV - RRR, 2/6 systolic murmur  Resp - lungs CTA b/l, no wheezing, normal WOB  GI - abdomen S, NT, ND, +BS. No hepatosplenomegaly   - mild left CVA tenderness, bladder non-palpable in lower abdomen  MSK - normal tone and bulk, no edema. Pain on palpation of left lower ribs on the flank  Neuro - A&O, no focal deficits  Psych - calm and cooperative with normal affect    Pertinent imaging studies:    CT head 7/11/18  FINDINGS: Periventricular white matter hypodensity is nonspecific, but  consistent with chronic small vessel ischemic disease. The ventricles and sulci  are appropriate in size and configuration for age. No loss of gray-white  differentiation to suggest late acute or early subacute infarction. No mass  effect or intracranial hemorrhage. A left ocular prosthesis is in place.     IMPRESSION  IMPRESSION: No acute intracranial abnormality. CXR 7/11/18  FINDINGS: A portable AP radiograph of the chest was obtained at 1556 hours. Left  axillary AICD device is again shown. Mild cardiac contour enlargement and  moderate thoracic aortic tortuosity and ectasia are unchanged, with otherwise  normal mediastinal and hilar contours. The lungs are clear. The bones are  moderately osteopenic.      IMPRESSION  IMPRESSION: No acute findings. US renal 7/12/18  The patient was studied with real-time ultrasound. Coronal and transverse images  of both kidneys were obtained. Right renal parenchyma is thin and echogenic. In  the midportion of the right kidney there is a lobulated cyst or possibly 2 or 3  cysts adjacent to each other measure 5.8 x 4.0 x 3.8 cm. This is identified on  the previous CT scan as well. Medial to this there is a smaller cyst which  corresponds to finding on previous CT as well as which measures 1.2 cm. There is  no hydronephrosis.  The left kidney demonstrates a simple cyst arising from the  lower pole measuring 4.1 x 4.2 x 4. 1 cm. There are also smaller cysts noted in  the mid to upper pole which may be correlated with hyperdense structure on the  previous CT. Peggyann Gang Underlying parenchyma is echogenic and thin. There is no  hydronephrosis. The longest dimension of the right kidney is 9.8 cm, while the  left kidney is 10.5 cm. The entire abdominal aorta and proximal origins of the  iliac arteries are obscured by bowel gas. The IVC is patent. The urinary bladder  was minimally distended and nearly empty during examination.      IMPRESSION  IMPRESSION:         1. No hydronephrosis. 2. Thin echogenic renal parenchyma as may be seen in this age group as well as  with medical renal disease. 3. The abdominal aorta cannot be visualized. Please correlate abdominal aorta  findings on previous CT. Duplex carotid 7/13/18  FINDINGS:       Right:  Internal carotid velocity is within normal limits. There   is narrowing of the internal carotid flow channel on color Doppler  imaging and mixed density plaque on B-mode imaging, consistent with  less than 50 percent stenosis. The common and external carotid  arteries are patent and without evidence of hemodynamically  significant stenosis. Left: Internal carotid velocity is within normal limits. There  is narrowing of the internal carotid flow channel on color Doppler  imaging and calcific plaque on B-mode imaging, consistent with less  than 50 percent stenosis. The common and external carotid arteries  are patent and without evidence of hemodynamically significant  stenosis.     IMPRESSION:  Consistent with less than 50% stenosis of the internal  carotids. Vertebrals are patent with antegrade flow. Xray left ribs 7/13/18  Findings:  AP view of the chest and AP and oblique views of the left ribs were obtained.     The lungs are clear. The heart is top normal in size.  There is an unchanged left  subclavian approach AICD and leads.     Generalized osteopenia is noted, which slightly limits evaluation of the ribs. Left-sided rib series demonstrates no fracture or other left sided rib  abnormality.     IMPRESSION  Impression:  No left-sided rib abnormality. No acute process in the chest.      No results for input(s): WBC, HGB, HCT, PLT, HGBEXT, HCTEXT, PLTEXT in the last 72 hours. Recent Labs      07/14/18   0256  07/13/18   0339   NA  136  141   K  4.8  4.5   CL  105  106   CO2  24  27   BUN  33*  32*   CREA  2.03*  1.98*   GLU  94  99   CA  8.8  8.9     No results for input(s): SGOT, GPT, AP, TBIL, TP, ALB, GLOB, GGT, AML, LPSE in the last 72 hours. No lab exists for component: AMYP, HLPSE  No results for input(s): INR, PTP, APTT in the last 72 hours. No lab exists for component: INREXT   No results for input(s): FE, TIBC, PSAT, FERR in the last 72 hours. No results for input(s): PH, PCO2, PO2 in the last 72 hours. No results for input(s): CPK, CKMB in the last 72 hours. No lab exists for component: TROPONINI  No components found for: [place]    Chronic Diagnoses:    Problem List as of 7/14/2018  Date Reviewed: 7/11/2018          Codes Class Noted - Resolved    * (Principal)Dizziness ICD-10-CM: C54  ICD-9-CM: 780.4  7/11/2018 - Present        [facility] ICD-10-CM: V09.620T, Z96.649  ICD-9-CM: 996.42, V43.64  7/31/2013 - Present    Overview Signed 7/31/2013  6:14 PM by Eitan Jorge MD     Right hip dislocation             Manasa-prosthetic fracture around prosthetic hip (Chronic) ICD-10-CM: M97. [id number], [id number]  ICD-9-CM: 996.44, V43.64  5/30/2011 - Present        Dyslipidemia (Chronic) ICD-10-CM: E78.5  ICD-9-CM: 272.4  5/30/2011 - Present        HBP (high blood pressure) ICD-10-CM: I10  ICD-9-CM: 401.9  5/30/2011 - Present        Dislocated toe ICD-10-CM: G76.902U  ICD-9-CM: 838.09  5/30/2011 - Present        RESOLVED: Chest pain ICD-10-CM: R07.9  ICD-9-CM: 786.50  7/2/2017 - 9/27/2017              Time spent on discharge related activities today greater than 30 minutes. Signed:  Neha Whitehead MD                 Hospitalist, Internal Medicine      Cc:  Nav Haro MD

## 2018-07-14 NOTE — PROGRESS NOTES
Bedside shift change report given to Angel Clemente RN (oncoming nurse) by Elton Michael RN (offgoing nurse). Report included the following information SBAR, Kardex, ED Summary, Procedure Summary, Intake/Output, MAR, Accordion, Recent Results and Cardiac Rhythm NSR w/ 1 degree AVB.

## 2018-07-14 NOTE — PROGRESS NOTES
Problem: Falls - Risk of  Goal: *Absence of Falls  Document Tisha Fall Risk and appropriate interventions in the flowsheet. Outcome: Progressing Towards Goal  Fall Risk Interventions:  Mobility Interventions: Assess mobility with egress test, Bed/chair exit alarm, Communicate number of staff needed for ambulation/transfer, OT consult for ADLs, Patient to call before getting OOB, PT Consult for mobility concerns, PT Consult for assist device competence, Utilize walker, cane, or other assistive device, Strengthening exercises (ROM-active/passive)         Medication Interventions: Assess postural VS orthostatic hypotension, Evaluate medications/consider consulting pharmacy, Patient to call before getting OOB, Teach patient to arise slowly                  Problem: Pressure Injury - Risk of  Goal: *Prevention of pressure injury  Document Aurelio Scale and appropriate interventions in the flowsheet. Outcome: Progressing Towards Goal  Pressure Injury Interventions: Activity Interventions: Assess need for specialty bed, Increase time out of bed, Pressure redistribution bed/mattress(bed type), PT/OT evaluation    Mobility Interventions: Assess need for specialty bed, Float heels, HOB 30 degrees or less, Pressure redistribution bed/mattress (bed type), PT/OT evaluation, Suspension boots, Turn and reposition approx.  every two hours(pillow and wedges)    Nutrition Interventions: Document food/fluid/supplement intake, Offer support with meals,snacks and hydration

## 2018-07-14 NOTE — PROGRESS NOTES
The patient has a discharge order. Cm reviewed previous care management note and the patient is active with [facility]. Cm sent a referral to [facility] them the patient is being discharged.

## 2018-07-15 ENCOUNTER — HOME CARE VISIT (OUTPATIENT)
Dept: HOME HEALTH SERVICES | Facility: HOME HEALTH | Age: 83
End: 2018-07-15
Payer: MEDICARE

## 2018-07-15 PROCEDURE — 3331090002 HH PPS REVENUE DEBIT

## 2018-07-15 PROCEDURE — 3331090001 HH PPS REVENUE CREDIT

## 2018-07-16 ENCOUNTER — HOME CARE VISIT (OUTPATIENT)
Dept: SCHEDULING | Facility: HOME HEALTH | Age: 83
End: 2018-07-16
Payer: MEDICARE

## 2018-07-16 VITALS
RESPIRATION RATE: 20 BRPM | WEIGHT: 197.53 LBS | TEMPERATURE: 98.5 F | SYSTOLIC BLOOD PRESSURE: 156 MMHG | OXYGEN SATURATION: 97 % | BODY MASS INDEX: 28.28 KG/M2 | HEIGHT: 70 IN | DIASTOLIC BLOOD PRESSURE: 82 MMHG | HEART RATE: 60 BPM

## 2018-07-16 PROCEDURE — 3331090001 HH PPS REVENUE CREDIT

## 2018-07-16 PROCEDURE — 3331090002 HH PPS REVENUE DEBIT

## 2018-07-16 PROCEDURE — G0151 HHCP-SERV OF PT,EA 15 MIN: HCPCS

## 2018-07-17 PROCEDURE — 3331090001 HH PPS REVENUE CREDIT

## 2018-07-17 PROCEDURE — 3331090002 HH PPS REVENUE DEBIT

## 2018-07-18 ENCOUNTER — HOME CARE VISIT (OUTPATIENT)
Dept: SCHEDULING | Facility: HOME HEALTH | Age: 83
End: 2018-07-18
Payer: MEDICARE

## 2018-07-18 ENCOUNTER — HOME CARE VISIT (OUTPATIENT)
Dept: HOME HEALTH SERVICES | Facility: HOME HEALTH | Age: 83
End: 2018-07-18
Payer: MEDICARE

## 2018-07-18 VITALS
TEMPERATURE: 98.1 F | DIASTOLIC BLOOD PRESSURE: 80 MMHG | RESPIRATION RATE: 17 BRPM | SYSTOLIC BLOOD PRESSURE: 138 MMHG | OXYGEN SATURATION: 97 % | HEART RATE: 58 BPM

## 2018-07-18 VITALS
DIASTOLIC BLOOD PRESSURE: 80 MMHG | HEART RATE: 61 BPM | SYSTOLIC BLOOD PRESSURE: 130 MMHG | OXYGEN SATURATION: 98 % | RESPIRATION RATE: 18 BRPM | TEMPERATURE: 98.4 F

## 2018-07-18 PROCEDURE — G0157 HHC PT ASSISTANT EA 15: HCPCS

## 2018-07-18 PROCEDURE — 3331090002 HH PPS REVENUE DEBIT

## 2018-07-18 PROCEDURE — 3331090001 HH PPS REVENUE CREDIT

## 2018-07-18 PROCEDURE — G0299 HHS/HOSPICE OF RN EA 15 MIN: HCPCS

## 2018-07-19 PROCEDURE — 3331090002 HH PPS REVENUE DEBIT

## 2018-07-19 PROCEDURE — 3331090001 HH PPS REVENUE CREDIT

## 2018-07-20 PROCEDURE — 3331090002 HH PPS REVENUE DEBIT

## 2018-07-20 PROCEDURE — 3331090001 HH PPS REVENUE CREDIT

## 2018-07-21 PROCEDURE — 3331090001 HH PPS REVENUE CREDIT

## 2018-07-21 PROCEDURE — 3331090002 HH PPS REVENUE DEBIT

## 2018-07-22 PROCEDURE — 3331090002 HH PPS REVENUE DEBIT

## 2018-07-22 PROCEDURE — 3331090001 HH PPS REVENUE CREDIT

## 2018-07-23 ENCOUNTER — HOME CARE VISIT (OUTPATIENT)
Dept: SCHEDULING | Facility: HOME HEALTH | Age: 83
End: 2018-07-23
Payer: MEDICARE

## 2018-07-23 VITALS
TEMPERATURE: 97.9 F | RESPIRATION RATE: 17 BRPM | OXYGEN SATURATION: 91 % | DIASTOLIC BLOOD PRESSURE: 66 MMHG | SYSTOLIC BLOOD PRESSURE: 122 MMHG | HEART RATE: 71 BPM

## 2018-07-23 PROCEDURE — 3331090001 HH PPS REVENUE CREDIT

## 2018-07-23 PROCEDURE — 3331090002 HH PPS REVENUE DEBIT

## 2018-07-23 PROCEDURE — G0157 HHC PT ASSISTANT EA 15: HCPCS

## 2018-07-24 PROCEDURE — 3331090001 HH PPS REVENUE CREDIT

## 2018-07-24 PROCEDURE — 3331090002 HH PPS REVENUE DEBIT

## 2018-07-25 PROCEDURE — 3331090002 HH PPS REVENUE DEBIT

## 2018-07-25 PROCEDURE — 3331090001 HH PPS REVENUE CREDIT

## 2018-07-26 ENCOUNTER — HOME CARE VISIT (OUTPATIENT)
Dept: SCHEDULING | Facility: HOME HEALTH | Age: 83
End: 2018-07-26
Payer: MEDICARE

## 2018-07-26 VITALS
HEART RATE: 60 BPM | RESPIRATION RATE: 17 BRPM | OXYGEN SATURATION: 94 % | DIASTOLIC BLOOD PRESSURE: 62 MMHG | SYSTOLIC BLOOD PRESSURE: 120 MMHG

## 2018-07-26 PROCEDURE — G0300 HHS/HOSPICE OF LPN EA 15 MIN: HCPCS

## 2018-07-26 PROCEDURE — 3331090001 HH PPS REVENUE CREDIT

## 2018-07-26 PROCEDURE — 3331090002 HH PPS REVENUE DEBIT

## 2018-07-26 PROCEDURE — G0157 HHC PT ASSISTANT EA 15: HCPCS

## 2018-07-27 ENCOUNTER — HOME CARE VISIT (OUTPATIENT)
Dept: SCHEDULING | Facility: HOME HEALTH | Age: 83
End: 2018-07-27
Payer: MEDICARE

## 2018-07-27 PROCEDURE — G0157 HHC PT ASSISTANT EA 15: HCPCS

## 2018-07-27 PROCEDURE — 3331090001 HH PPS REVENUE CREDIT

## 2018-07-27 PROCEDURE — 3331090002 HH PPS REVENUE DEBIT

## 2018-07-28 VITALS
HEART RATE: 70 BPM | OXYGEN SATURATION: 94 % | SYSTOLIC BLOOD PRESSURE: 116 MMHG | TEMPERATURE: 98.1 F | DIASTOLIC BLOOD PRESSURE: 60 MMHG | RESPIRATION RATE: 16 BRPM

## 2018-07-28 PROCEDURE — 3331090002 HH PPS REVENUE DEBIT

## 2018-07-28 PROCEDURE — 3331090001 HH PPS REVENUE CREDIT

## 2018-07-29 VITALS
RESPIRATION RATE: 18 BRPM | OXYGEN SATURATION: 97 % | TEMPERATURE: 98.5 F | SYSTOLIC BLOOD PRESSURE: 112 MMHG | HEART RATE: 89 BPM | DIASTOLIC BLOOD PRESSURE: 74 MMHG

## 2018-07-29 PROCEDURE — 3331090001 HH PPS REVENUE CREDIT

## 2018-07-29 PROCEDURE — 3331090002 HH PPS REVENUE DEBIT

## 2018-07-30 ENCOUNTER — HOME CARE VISIT (OUTPATIENT)
Dept: SCHEDULING | Facility: HOME HEALTH | Age: 83
End: 2018-07-30
Payer: MEDICARE

## 2018-07-30 VITALS
SYSTOLIC BLOOD PRESSURE: 120 MMHG | OXYGEN SATURATION: 92 % | HEART RATE: 60 BPM | TEMPERATURE: 98.2 F | DIASTOLIC BLOOD PRESSURE: 62 MMHG | RESPIRATION RATE: 17 BRPM

## 2018-07-30 PROCEDURE — 3331090002 HH PPS REVENUE DEBIT

## 2018-07-30 PROCEDURE — G0157 HHC PT ASSISTANT EA 15: HCPCS

## 2018-07-30 PROCEDURE — 3331090001 HH PPS REVENUE CREDIT

## 2018-07-31 ENCOUNTER — HOME CARE VISIT (OUTPATIENT)
Dept: HOME HEALTH SERVICES | Facility: HOME HEALTH | Age: 83
End: 2018-07-31
Payer: MEDICARE

## 2018-07-31 PROCEDURE — 3331090001 HH PPS REVENUE CREDIT

## 2018-07-31 PROCEDURE — 3331090002 HH PPS REVENUE DEBIT

## 2018-08-01 ENCOUNTER — HOME CARE VISIT (OUTPATIENT)
Dept: SCHEDULING | Facility: HOME HEALTH | Age: 83
End: 2018-08-01
Payer: MEDICARE

## 2018-08-01 VITALS
TEMPERATURE: 98.4 F | HEART RATE: 60 BPM | RESPIRATION RATE: 17 BRPM | OXYGEN SATURATION: 92 % | SYSTOLIC BLOOD PRESSURE: 118 MMHG | DIASTOLIC BLOOD PRESSURE: 70 MMHG

## 2018-08-01 PROCEDURE — G0157 HHC PT ASSISTANT EA 15: HCPCS

## 2018-08-01 PROCEDURE — 3331090002 HH PPS REVENUE DEBIT

## 2018-08-01 PROCEDURE — 3331090001 HH PPS REVENUE CREDIT

## 2018-08-02 ENCOUNTER — APPOINTMENT (OUTPATIENT)
Dept: CT IMAGING | Age: 83
End: 2018-08-02
Attending: EMERGENCY MEDICINE
Payer: MEDICARE

## 2018-08-02 ENCOUNTER — APPOINTMENT (OUTPATIENT)
Dept: GENERAL RADIOLOGY | Age: 83
End: 2018-08-02
Attending: EMERGENCY MEDICINE
Payer: MEDICARE

## 2018-08-02 ENCOUNTER — HOSPITAL ENCOUNTER (EMERGENCY)
Age: 83
Discharge: HOME | End: 2018-08-02
Attending: EMERGENCY MEDICINE
Payer: MEDICARE

## 2018-08-02 VITALS
OXYGEN SATURATION: 94 % | TEMPERATURE: 97.5 F | WEIGHT: 200.18 LBS | HEART RATE: 60 BPM | HEIGHT: 68 IN | DIASTOLIC BLOOD PRESSURE: 85 MMHG | SYSTOLIC BLOOD PRESSURE: 165 MMHG | BODY MASS INDEX: 30.34 KG/M2 | RESPIRATION RATE: 12 BRPM

## 2018-08-02 DIAGNOSIS — R42 VERTIGO: ICD-10-CM

## 2018-08-02 DIAGNOSIS — R42 DIZZINESS: Primary | ICD-10-CM

## 2018-08-02 LAB
ALBUMIN SERPL-MCNC: 3 G/DL (ref 3.5–5)
ALBUMIN/GLOB SERPL: 0.7 {RATIO} (ref 1.1–2.2)
ALP SERPL-CCNC: 103 U/L (ref 45–117)
ALT SERPL-CCNC: 15 U/L (ref 12–78)
ANION GAP SERPL CALC-SCNC: 6 MMOL/L (ref 5–15)
APTT PPP: 45.2 SEC (ref 22.1–32)
AST SERPL-CCNC: 16 U/L (ref 15–37)
ATRIAL RATE: 63 BPM
BASOPHILS # BLD: 0.1 K/UL (ref 0–0.1)
BASOPHILS NFR BLD: 1 % (ref 0–1)
BILIRUB SERPL-MCNC: 0.5 MG/DL (ref 0.2–1)
BNP SERPL-MCNC: 1809 PG/ML (ref 0–450)
BUN SERPL-MCNC: 27 MG/DL (ref 6–20)
BUN/CREAT SERPL: 13 (ref 12–20)
CALCIUM SERPL-MCNC: 8.7 MG/DL (ref 8.5–10.1)
CALCULATED P AXIS, ECG09: 101 DEGREES
CALCULATED R AXIS, ECG10: 9 DEGREES
CALCULATED T AXIS, ECG11: 35 DEGREES
CHLORIDE SERPL-SCNC: 108 MMOL/L (ref 97–108)
CO2 SERPL-SCNC: 23 MMOL/L (ref 21–32)
CREAT SERPL-MCNC: 2.05 MG/DL (ref 0.7–1.3)
DIAGNOSIS, 93000: NORMAL
DIFFERENTIAL METHOD BLD: ABNORMAL
EOSINOPHIL # BLD: 0.3 K/UL (ref 0–0.4)
EOSINOPHIL NFR BLD: 3 % (ref 0–7)
ERYTHROCYTE [DISTWIDTH] IN BLOOD BY AUTOMATED COUNT: 15.4 % (ref 11.5–14.5)
GLOBULIN SER CALC-MCNC: 4.4 G/DL (ref 2–4)
GLUCOSE SERPL-MCNC: 139 MG/DL (ref 65–100)
HCT VFR BLD AUTO: 37 % (ref 36.6–50.3)
HGB BLD-MCNC: 11.8 G/DL (ref 12.1–17)
IMM GRANULOCYTES # BLD: 0 K/UL (ref 0–0.04)
IMM GRANULOCYTES NFR BLD AUTO: 0 % (ref 0–0.5)
INR PPP: 1.9 (ref 0.9–1.1)
LYMPHOCYTES # BLD: 2 K/UL (ref 0.8–3.5)
LYMPHOCYTES NFR BLD: 18 % (ref 12–49)
MAGNESIUM SERPL-MCNC: 2.2 MG/DL (ref 1.6–2.4)
MCH RBC QN AUTO: 29.8 PG (ref 26–34)
MCHC RBC AUTO-ENTMCNC: 31.9 G/DL (ref 30–36.5)
MCV RBC AUTO: 93.4 FL (ref 80–99)
MONOCYTES # BLD: 0.8 K/UL (ref 0–1)
MONOCYTES NFR BLD: 7 % (ref 5–13)
NEUTS SEG # BLD: 7.8 K/UL (ref 1.8–8)
NEUTS SEG NFR BLD: 71 % (ref 32–75)
NRBC # BLD: 0 K/UL (ref 0–0.01)
NRBC BLD-RTO: 0 PER 100 WBC
P-R INTERVAL, ECG05: 384 MS
PHOSPHATE SERPL-MCNC: 3.2 MG/DL (ref 2.6–4.7)
PLATELET # BLD AUTO: 217 K/UL (ref 150–400)
PMV BLD AUTO: 10.9 FL (ref 8.9–12.9)
POTASSIUM SERPL-SCNC: 4.1 MMOL/L (ref 3.5–5.1)
PROT SERPL-MCNC: 7.4 G/DL (ref 6.4–8.2)
PROTHROMBIN TIME: 19.4 SEC (ref 9–11.1)
Q-T INTERVAL, ECG07: 494 MS
QRS DURATION, ECG06: 130 MS
QTC CALCULATION (BEZET), ECG08: 505 MS
RBC # BLD AUTO: 3.96 M/UL (ref 4.1–5.7)
SODIUM SERPL-SCNC: 137 MMOL/L (ref 136–145)
THERAPEUTIC RANGE,PTTT: ABNORMAL SECS (ref 58–77)
VENTRICULAR RATE, ECG03: 63 BPM
WBC # BLD AUTO: 11 K/UL (ref 4.1–11.1)

## 2018-08-02 PROCEDURE — 85730 THROMBOPLASTIN TIME PARTIAL: CPT | Performed by: EMERGENCY MEDICINE

## 2018-08-02 PROCEDURE — 80053 COMPREHEN METABOLIC PANEL: CPT | Performed by: EMERGENCY MEDICINE

## 2018-08-02 PROCEDURE — 36415 COLL VENOUS BLD VENIPUNCTURE: CPT | Performed by: EMERGENCY MEDICINE

## 2018-08-02 PROCEDURE — 70450 CT HEAD/BRAIN W/O DYE: CPT

## 2018-08-02 PROCEDURE — 96374 THER/PROPH/DIAG INJ IV PUSH: CPT

## 2018-08-02 PROCEDURE — 3331090001 HH PPS REVENUE CREDIT

## 2018-08-02 PROCEDURE — 93005 ELECTROCARDIOGRAM TRACING: CPT

## 2018-08-02 PROCEDURE — 71045 X-RAY EXAM CHEST 1 VIEW: CPT

## 2018-08-02 PROCEDURE — 85025 COMPLETE CBC W/AUTO DIFF WBC: CPT | Performed by: EMERGENCY MEDICINE

## 2018-08-02 PROCEDURE — 96375 TX/PRO/DX INJ NEW DRUG ADDON: CPT

## 2018-08-02 PROCEDURE — 99285 EMERGENCY DEPT VISIT HI MDM: CPT

## 2018-08-02 PROCEDURE — 83735 ASSAY OF MAGNESIUM: CPT | Performed by: EMERGENCY MEDICINE

## 2018-08-02 PROCEDURE — 83880 ASSAY OF NATRIURETIC PEPTIDE: CPT | Performed by: EMERGENCY MEDICINE

## 2018-08-02 PROCEDURE — 84100 ASSAY OF PHOSPHORUS: CPT | Performed by: EMERGENCY MEDICINE

## 2018-08-02 PROCEDURE — 74011250636 HC RX REV CODE- 250/636: Performed by: EMERGENCY MEDICINE

## 2018-08-02 PROCEDURE — 96361 HYDRATE IV INFUSION ADD-ON: CPT

## 2018-08-02 PROCEDURE — 3331090002 HH PPS REVENUE DEBIT

## 2018-08-02 PROCEDURE — 85610 PROTHROMBIN TIME: CPT | Performed by: EMERGENCY MEDICINE

## 2018-08-02 RX ORDER — MECLIZINE HCL 25MG 25 MG/1
25 TABLET, CHEWABLE ORAL
Qty: 60 TAB | Refills: 1 | Status: SHIPPED | OUTPATIENT
Start: 2018-08-02 | End: 2018-08-12

## 2018-08-02 RX ORDER — POTASSIUM CHLORIDE 750 MG/1
10 CAPSULE, EXTENDED RELEASE ORAL DAILY
COMMUNITY

## 2018-08-02 RX ORDER — LORAZEPAM 2 MG/ML
1 INJECTION INTRAMUSCULAR
Status: DISCONTINUED | OUTPATIENT
Start: 2018-08-02 | End: 2018-08-02

## 2018-08-02 RX ORDER — LORAZEPAM 2 MG/ML
1 INJECTION INTRAMUSCULAR
Status: COMPLETED | OUTPATIENT
Start: 2018-08-02 | End: 2018-08-02

## 2018-08-02 RX ORDER — ONDANSETRON 2 MG/ML
4 INJECTION INTRAMUSCULAR; INTRAVENOUS
Status: COMPLETED | OUTPATIENT
Start: 2018-08-02 | End: 2018-08-02

## 2018-08-02 RX ORDER — ONDANSETRON 8 MG/1
8 TABLET, ORALLY DISINTEGRATING ORAL
Qty: 15 TAB | Refills: 0 | Status: SHIPPED | OUTPATIENT
Start: 2018-08-02

## 2018-08-02 RX ADMIN — ONDANSETRON 4 MG: 2 INJECTION INTRAMUSCULAR; INTRAVENOUS at 05:27

## 2018-08-02 RX ADMIN — LORAZEPAM 1 MG: 2 INJECTION INTRAMUSCULAR; INTRAVENOUS at 05:50

## 2018-08-02 RX ADMIN — SODIUM CHLORIDE 1000 ML: 900 INJECTION, SOLUTION INTRAVENOUS at 05:39

## 2018-08-02 NOTE — DISCHARGE INSTRUCTIONS
Dizziness: Care Instructions  Your Care Instructions  Dizziness is the feeling of unsteadiness or fuzziness in your head. It is different than having vertigo, which is a feeling that the room is spinning or that you are moving or falling. It is also different from lightheadedness, which is the feeling that you are about to faint. It can be hard to know what causes dizziness. Some people feel dizzy when they have migraine headaches. Sometimes bouts of flu can make you feel dizzy. Some medical conditions, such as heart problems or high blood pressure, can make you feel dizzy. Many medicines can cause dizziness, including medicines for high blood pressure, pain, or anxiety. If a medicine causes your symptoms, your doctor may recommend that you stop or change the medicine. If it is a problem with your heart, you may need medicine to help your heart work better. If there is no clear reason for your symptoms, your doctor may suggest watching and waiting for a while to see if the dizziness goes away on its own. Follow-up care is a key part of your treatment and safety. Be sure to make and go to all appointments, and call your doctor if you are having problems. It's also a good idea to know your test results and keep a list of the medicines you take. How can you care for yourself at home? · If your doctor recommends or prescribes medicine, take it exactly as directed. Call your doctor if you think you are having a problem with your medicine. · Do not drive while you feel dizzy. · Try to prevent falls. Steps you can take include:  ¨ Using nonskid mats, adding grab bars near the tub, and using night-lights. ¨ Clearing your home so that walkways are free of anything you might trip on. ¨ Letting family and friends know that you have been feeling dizzy. This will help them know how to help you. When should you call for help? Call 911 anytime you think you may need emergency care.  For example, call if:    · You passed out (lost consciousness).     · You have dizziness along with symptoms of a heart attack. These may include:  ¨ Chest pain or pressure, or a strange feeling in the chest.  ¨ Sweating. ¨ Shortness of breath. ¨ Nausea or vomiting. ¨ Pain, pressure, or a strange feeling in the back, neck, jaw, or upper belly or in one or both shoulders or arms. ¨ Lightheadedness or sudden weakness. ¨ A fast or irregular heartbeat.     · You have symptoms of a stroke. These may include:  ¨ Sudden numbness, tingling, weakness, or loss of movement in your face, arm, or leg, especially on only one side of your body. ¨ Sudden vision changes. ¨ Sudden trouble speaking. ¨ Sudden confusion or trouble understanding simple statements. ¨ Sudden problems with walking or balance. ¨ A sudden, severe headache that is different from past headaches.    Call your doctor now or seek immediate medical care if:    · You feel dizzy and have a fever, headache, or ringing in your ears.     · You have new or increased nausea and vomiting.     · Your dizziness does not go away or comes back.    Watch closely for changes in your health, and be sure to contact your doctor if:    · You do not get better as expected. Where can you learn more? Go to http://kavitha-jolie.info/. Enter V225 in the search box to learn more about \"Dizziness: Care Instructions. \"  Current as of: November 20, 2017  Content Version: 11.7  © 3694-2280 [employer]. Care instructions adapted under license by [employer] (which disclaims liability or warranty for this information). If you have questions about a medical condition or this instruction, always ask your healthcare professional. [place] [address] any warranty or liability for your use of this information.          Vertigo: Care Instructions  Your Care Instructions    Vertigo is the feeling that you or your surroundings are moving when there is no actual movement. It is often described as a feeling of spinning, whirling, falling, or tilting. Vertigo may make you vomit or feel nauseated. You may have trouble standing or walking and may lose your balance. Vertigo is often related to an inner ear problem, but it can have other more serious causes. If vertigo continues, you may need more tests to find its cause. Follow-up care is a key part of your treatment and safety. Be sure to make and go to all appointments, and call your doctor if you are having problems. It's also a good idea to know your test results and keep a list of the medicines you take. How can you care for yourself at home? · Do not lie flat on your back. Prop yourself up slightly. This may reduce the spinning feeling. Keep your eyes open. · Move slowly so that you do not fall. · If your doctor recommends medicine, take it exactly as directed. · Do not drive while you are having vertigo. Certain exercises, called Mauro-Daroff exercises, can help decrease vertigo. To do Mauro-Daroff exercises:  · Sit on the edge of a bed or sofa and quickly lie down on the side that causes the worst vertigo. Lie on your side with your ear down. · Stay in this position for at least 30 seconds or until the vertigo goes away. · Sit up. If this causes vertigo, wait for it to stop. · Repeat the procedure on the other side. · Repeat this 10 times. Do these exercises 2 times a day until the vertigo is gone. When should you call for help? Call 911 anytime you think you may need emergency care. For example, call if:    · You passed out (lost consciousness).     · You have symptoms of a stroke. These may include:  ¨ Sudden numbness, tingling, weakness, or loss of movement in your face, arm, or leg, especially on only one side of your body. ¨ Sudden vision changes. ¨ Sudden trouble speaking. ¨ Sudden confusion or trouble understanding simple statements. ¨ Sudden problems with walking or balance.   ¨ A sudden, severe headache that is different from past headaches.    Call your doctor now or seek immediate medical care if:    · Vertigo occurs with a fever, a headache, or ringing in your ears.     · You have new or increased nausea and vomiting.    Watch closely for changes in your health, and be sure to contact your doctor if:    · Vertigo gets worse or happens more often.     · Vertigo has not gotten better after 2 weeks. Where can you learn more? Go to http://kavitha-jolie.info/. Enter S117 in the search box to learn more about \"Vertigo: Care Instructions. \"  Current as of: May 12, 2017  Content Version: 11.7  © 6117-4081 [employer]. Care instructions adapted under license by its learning (which disclaims liability or warranty for this information). If you have questions about a medical condition or this instruction, always ask your healthcare professional. [place] [address] any warranty or liability for your use of this information.

## 2018-08-02 NOTE — ED NOTES
Patient discharged by MD. Results and discharge instructions reviewed with the patient by MD. Patient verbalizes understanding. Patient discharged home, stable, ambulatory, in no acute distress. Patient wheeled to the bathroom

## 2018-08-02 NOTE — ED TRIAGE NOTES
Arrived from home via EMS c/o dizziness, nausea, sob while laying down. Denies chest pain, 12 lead EMS showed acute MI. Daughter in waiting room. Patient has atrial pacemaker. Cardiologist Dr Rajiv Taylor. Per patient he was sleep when symptoms occurred. Patient unsure about time of symptoms. Left eye moderate yellow drainage. Patient stating he is receiving eye drops for the medicine.

## 2018-08-02 NOTE — ED PROVIDER NOTES
HPI Comments: 75-year-old male with history of ischemic cardiomyopathy, paroxysmal atrial fibrillation, chronic anticoagulation with Eliquis, echo on 7/11 , with LVEF 35%, hypertension, CHF, glaucoma, who presents to the ED by EMS and states that he woke up this night from sleep with complaint of dizziness and shortness of breath. The patient states that the room is spinning and appeared to be worsened by movement. Symptoms are accompanied by nausea and vomiting. The patient admits to prior episodes of similar symptoms approximately one month ago. He is to admits to generalized abdominal pain for several weeks. He was seen and evaluated for these symptoms at which time he had an extensive workup without significant findings. On T-System, the patient admits to chills, but denies any headache, chest pain, neck pain, back pain, diarrhea, or constipation, dysuria, nausea, vomiting, weakness, or numbness, sick contact at home, skin rash. Patient is a 80 y.o. male presenting with shortness of breath and nausea. Shortness of Breath Nausea Past Medical History:  
Diagnosis Date  CAD (coronary artery disease)  GERD (gastroesophageal reflux disease)  Heart failure ([facility] [address]  Hypertension Past Surgical History:  
Procedure Laterality Date  CARDIAC SURG PROCEDURE UNLIST    
 cath w/stent  HX ORTHOPAEDIC    
 bilateral hip replacement  HX PACEMAKER History reviewed. No pertinent family history. Social History Social History  Marital status:  Spouse name: N/A  
 Number of children: N/A  
 Years of education: N/A Occupational History  Not on file. Social History Main Topics  Smoking status: Never Smoker  Smokeless tobacco: Never Used  Alcohol use No  
 Drug use: Not on file  Sexual activity: Not on file Other Topics Concern  Not on file Social History Narrative ALLERGIES: Penicillins Review of Systems Respiratory: Positive for shortness of breath. Gastrointestinal: Positive for nausea. All other systems reviewed and are negative. Vitals:  
 08/02/18 0500 08/02/18 8882 BP: (!) 181/94 Pulse: 60 Resp: 20 Temp: 98 °F (36.7 °C) SpO2: 95% 95% Weight: 90.8 kg (200 lb 2.8 oz) Height: 5' 8\" (1.727 m) Physical Exam  
Nursing note and vitals reviewed. CONSTITUTIONAL: Well-appearing; well-nourished; in no apparent distress HEAD: Normocephalic; atraumatic EYES: PERRL; EOM intact; conjunctiva and sclera are clear bilaterally. ENT: No rhinorrhea; normal pharynx with no tonsillar hypertrophy; mucous membranes pink/moist, no erythema, no exudate. NECK: Supple; non-tender; no cervical lymphadenopathy CARD: Normal S1, S2; no murmurs, rubs, or gallops. Regular rate and rhythm. RESP: Normal respiratory effort; breath sounds clear and equal bilaterally; no wheezes, rhonchi, or rales. ABD: Normal bowel sounds; non-distended; non-tender; no palpable organomegaly, no masses, no bruits. Back Exam: Normal inspection; no vertebral point tenderness, no CVA tenderness. Normal range of motion. EXT: Normal ROM in all four extremities; non-tender to palpation; no swelling or deformity; distal pulses are normal, no edema. SKIN: Warm; dry; no rash. NEURO:Alert and oriented x 3, coherent, MAHNAZ-XII grossly intact, sensory and motor are non-focal. 
 
 
 
MDM Number of Diagnoses or Management Options Diagnosis management comments:  
 
Assessment: 55-year-old male with dizziness -symptoms appear consistent with vertigo rule out vertebral basilar insufficiency. The patient also admits to social stiffening or shortness of breath. He has a history of CHF, but remains hemodynamically stable. The patient will need to be evaluation for acute exacerbations of CHF. Plan: EKG/ chest x-ray / lab/ CT scan of the head/ Ativan/ Zofran/ serial exam/ monitor and reevaluate Amount and/or Complexity of Data Reviewed Clinical lab tests: ordered and reviewed Tests in the radiology section of CPT®: ordered and reviewed Tests in the medicine section of CPT®: reviewed and ordered Discussion of test results with the performing providers: yes Decide to obtain previous medical records or to obtain history from someone other than the patient: yes Obtain history from someone other than the patient: yes Review and summarize past medical records: yes Discuss the patient with other providers: yes Independent visualization of images, tracings, or specimens: yes Risk of Complications, Morbidity, and/or Mortality Presenting problems: moderate Diagnostic procedures: moderate Management options: moderate ED Course Procedures ED EKG interpretation: 
Rhythm: normal sinus rhythm; and regular . Rate (approx.): 63; Axis: normal; P wave: prolonged; QRS interval: prolonged; ST/T wave: non-specific changes; in  Lead: Diffusely; PVCs; first degree AV block; LAFB; Other findings: abnormal ekg. This EKG was interpreted by Georgia Chacon MD,ED Provider. Progress Note:  
Pt has been reexamined by Georgia Chacon MD. Pt is feeling much better. Symptoms have improved. All available results have been reviewed with pt and any available family. The patient ambulated in the ED at his baseline. He denies any further discomfort. Pt understands sx, dx, and tx in ED. Care plan has been outlined and questions have been answered. Pt is ready to go home. Will send home on dizziness/vertigo instructions. Prescription of Zofran, and meclizine. Outpatient referral with PCP as needed. Written by Georgia Chacon MD,8:04 AM 
 
. Patel Tobias

## 2018-08-02 NOTE — ED NOTES
Bedside shift change report given to christine RN  (oncoming nurse) by Angela Tracey RN  (offgoing nurse). Report included the following information SBAR and Recent Results.

## 2018-08-03 ENCOUNTER — HOME CARE VISIT (OUTPATIENT)
Dept: SCHEDULING | Facility: HOME HEALTH | Age: 83
End: 2018-08-03
Payer: MEDICARE

## 2018-08-03 PROCEDURE — G0300 HHS/HOSPICE OF LPN EA 15 MIN: HCPCS

## 2018-08-03 PROCEDURE — 3331090002 HH PPS REVENUE DEBIT

## 2018-08-03 PROCEDURE — 3331090001 HH PPS REVENUE CREDIT

## 2018-08-03 PROCEDURE — G0157 HHC PT ASSISTANT EA 15: HCPCS

## 2018-08-03 NOTE — CALL BACK NOTE
[facility] Emergency Department Follow Up Call Record Discharged to : Home/Family Home/Home Health/Skilled Facility/Rehab/Assisted Living/Other__Home_____ 1) Did you receive your discharge instructions? Yes       
2) Do you understand them? Yes        
3) Are you able to follow them? Yes If NO, what can I clarify for you? 4) Do you understand your diagnosis? Yes        
5) Do you know which symptoms should prompt you to call the doctor? Yes    
6) Were you able to fill and  any medications that were prescribed? Yes  
 
7) You were prescribed ___________for ____________________. Common side effects of this medication are____________________. This is not a complete list so please review the forms given from the pharmacy for a complete list. 8) Are there any questions about your medications? No     
 
  
 Have you scheduled any recommended doctors appointments (specialty, PCP) Patient reports he will call Monday 8/6/18 for appointment with PCP. If NO, what barriers are you encountering (transportation/lost contact info/cost/ 
didnt think necessary/no PCP 
9) If discharged with Home Health, has the agency contacted you to schedule visit? Not applicable 10) Is there anyone available to help you at home (meals, errands, transportation   
monitoring) (adult children, neighbors, private duty companions) Yes   
11) Are you on a special diet? Yes        
If YES, do you understand the requirements for this diet? Education provided? 12) If presented with cough, bronchitis, COPD, asthma, is it ok to ask that the 
 respiratory disease management educator call you? Not applicable 13)  A) If presented with fall, were you issued an assistive device in the ED Are you using? Not applicable B) If given RX for device, have you obtained? Not applicable If NO, barriers? C) Therapist recommended:NO Are you able to implement the suggestions? Not applicable  If NO, barriers to implementation? D) Are you having any difficulties with mobility inside your home?   
 (steps, bed, tub)No 
 If YES, ask if the [facility] PT can contact patient and good time and number? 
14)  At the end of your discharge instructions, there is information about accessing \[facility] [place] [facility]\"" & HEALTH SERVICES, have you had a chance to review those? Yes Do you have any questions about signing up for this service? No  
We encourage our patients to be active participants in their healthcare and this site is one of the ways to do that. It will allow you to access parts of your medical record, email your doctors office, schedule appointments, and request medications refills . 15) Are there any other questions that I can answer for you regarding  
 your Emergency department visit? NO Estimated Call Time:__11:03 AM 
_________________ Date/Time:_______________

## 2018-08-04 VITALS
TEMPERATURE: 98.1 F | HEART RATE: 60 BPM | DIASTOLIC BLOOD PRESSURE: 70 MMHG | SYSTOLIC BLOOD PRESSURE: 132 MMHG | RESPIRATION RATE: 16 BRPM | OXYGEN SATURATION: 96 %

## 2018-08-04 PROCEDURE — 3331090002 HH PPS REVENUE DEBIT

## 2018-08-04 PROCEDURE — 3331090001 HH PPS REVENUE CREDIT

## 2018-08-05 PROCEDURE — 3331090001 HH PPS REVENUE CREDIT

## 2018-08-05 PROCEDURE — 3331090002 HH PPS REVENUE DEBIT

## 2018-08-06 ENCOUNTER — HOME CARE VISIT (OUTPATIENT)
Dept: SCHEDULING | Facility: HOME HEALTH | Age: 83
End: 2018-08-06
Payer: MEDICARE

## 2018-08-06 VITALS
DIASTOLIC BLOOD PRESSURE: 70 MMHG | SYSTOLIC BLOOD PRESSURE: 120 MMHG | HEART RATE: 60 BPM | TEMPERATURE: 98.8 F | OXYGEN SATURATION: 93 % | RESPIRATION RATE: 17 BRPM

## 2018-08-06 VITALS
OXYGEN SATURATION: 98 % | SYSTOLIC BLOOD PRESSURE: 112 MMHG | DIASTOLIC BLOOD PRESSURE: 64 MMHG | HEART RATE: 89 BPM | TEMPERATURE: 98.1 F | RESPIRATION RATE: 18 BRPM

## 2018-08-06 PROCEDURE — 3331090002 HH PPS REVENUE DEBIT

## 2018-08-06 PROCEDURE — G0157 HHC PT ASSISTANT EA 15: HCPCS

## 2018-08-06 PROCEDURE — 3331090001 HH PPS REVENUE CREDIT

## 2018-08-07 PROCEDURE — 3331090002 HH PPS REVENUE DEBIT

## 2018-08-07 PROCEDURE — 3331090001 HH PPS REVENUE CREDIT

## 2018-08-08 PROCEDURE — 3331090002 HH PPS REVENUE DEBIT

## 2018-08-08 PROCEDURE — 3331090001 HH PPS REVENUE CREDIT

## 2018-08-09 ENCOUNTER — HOME CARE VISIT (OUTPATIENT)
Dept: SCHEDULING | Facility: HOME HEALTH | Age: 83
End: 2018-08-09
Payer: MEDICARE

## 2018-08-09 VITALS
RESPIRATION RATE: 18 BRPM | SYSTOLIC BLOOD PRESSURE: 110 MMHG | DIASTOLIC BLOOD PRESSURE: 60 MMHG | TEMPERATURE: 98.5 F | OXYGEN SATURATION: 94 % | HEART RATE: 62 BPM

## 2018-08-09 VITALS
RESPIRATION RATE: 22 BRPM | OXYGEN SATURATION: 94 % | SYSTOLIC BLOOD PRESSURE: 110 MMHG | HEART RATE: 62 BPM | DIASTOLIC BLOOD PRESSURE: 60 MMHG | TEMPERATURE: 98.5 F

## 2018-08-09 PROCEDURE — 3331090001 HH PPS REVENUE CREDIT

## 2018-08-09 PROCEDURE — G0300 HHS/HOSPICE OF LPN EA 15 MIN: HCPCS

## 2018-08-09 PROCEDURE — G0151 HHCP-SERV OF PT,EA 15 MIN: HCPCS

## 2018-08-09 PROCEDURE — 3331090003 HH PPS REVENUE ADJ

## 2018-08-09 PROCEDURE — 3331090002 HH PPS REVENUE DEBIT

## 2018-08-10 PROCEDURE — 3331090001 HH PPS REVENUE CREDIT

## 2018-08-10 PROCEDURE — 3331090002 HH PPS REVENUE DEBIT

## 2018-08-11 PROCEDURE — 3331090001 HH PPS REVENUE CREDIT

## 2018-08-11 PROCEDURE — 3331090002 HH PPS REVENUE DEBIT

## 2018-08-12 PROCEDURE — 3331090002 HH PPS REVENUE DEBIT

## 2018-08-12 PROCEDURE — 3331090001 HH PPS REVENUE CREDIT

## 2018-08-13 PROCEDURE — 3331090001 HH PPS REVENUE CREDIT

## 2018-08-13 PROCEDURE — 3331090002 HH PPS REVENUE DEBIT

## 2018-08-15 ENCOUNTER — HOME CARE VISIT (OUTPATIENT)
Dept: SCHEDULING | Facility: HOME HEALTH | Age: 83
End: 2018-08-15
Payer: MEDICARE

## 2018-08-15 VITALS
DIASTOLIC BLOOD PRESSURE: 80 MMHG | RESPIRATION RATE: 18 BRPM | HEART RATE: 60 BPM | TEMPERATURE: 97.7 F | SYSTOLIC BLOOD PRESSURE: 150 MMHG | OXYGEN SATURATION: 98 %

## 2022-11-01 NOTE — ANESTHESIA POSTPROCEDURE EVALUATION
Post-Anesthesia Evaluation and Assessment    Patient: Patty Wagner Sr. MRN: [id number]  SSN: [id number]    YOB: 1930  Age: 80 y.o. Sex: male       Cardiovascular Function/Vital Signs  Visit Vitals    /86 (BP 1 Location: Left arm, BP Patient Position: At rest)    Pulse 82    Temp 35.7 °C (96.3 °F)    Resp 14    Ht 5' 8\" (1.727 m)    Wt 90.2 kg (198 lb 13.7 oz)    SpO2 94%    BMI 30.24 kg/m2       Patient is status post MAC anesthesia for * No procedures listed *. Nausea/Vomiting: None    Postoperative hydration reviewed and adequate. Pain:  Pain Scale 1: Numeric (0 - 10) (09/27/17 1347)  Pain Intensity 1: 0 (09/27/17 1347)   Managed    Neurological Status: At baseline    Mental Status and Level of Consciousness: Arousable    Pulmonary Status:   O2 Device: Room air (09/27/17 1347)   Adequate oxygenation and airway patent    Complications related to anesthesia: None    Post-anesthesia assessment completed.  No concerns    Signed By: Wendy Wiggins MD     September 27, 2017 Prescription approved per [facility] Refill Protocol.  Jun Perera RN